# Patient Record
Sex: FEMALE | Race: BLACK OR AFRICAN AMERICAN | NOT HISPANIC OR LATINO | Employment: FULL TIME | URBAN - METROPOLITAN AREA
[De-identification: names, ages, dates, MRNs, and addresses within clinical notes are randomized per-mention and may not be internally consistent; named-entity substitution may affect disease eponyms.]

---

## 2017-01-31 ENCOUNTER — OFFICE VISIT (OUTPATIENT)
Dept: INTERNAL MEDICINE | Facility: CLINIC | Age: 57
End: 2017-01-31
Payer: COMMERCIAL

## 2017-01-31 VITALS
HEART RATE: 66 BPM | SYSTOLIC BLOOD PRESSURE: 110 MMHG | WEIGHT: 175.94 LBS | DIASTOLIC BLOOD PRESSURE: 60 MMHG | TEMPERATURE: 97 F | OXYGEN SATURATION: 99 % | BODY MASS INDEX: 24.19 KG/M2

## 2017-01-31 DIAGNOSIS — I10 HYPERTENSION, ESSENTIAL: ICD-10-CM

## 2017-01-31 DIAGNOSIS — R60.0 EDEMA EXTREMITIES: ICD-10-CM

## 2017-01-31 DIAGNOSIS — Z00.00 WELLNESS EXAMINATION: Primary | ICD-10-CM

## 2017-01-31 DIAGNOSIS — R79.89 LOW SERUM VITAMIN D: ICD-10-CM

## 2017-01-31 DIAGNOSIS — J30.9 CHRONIC ALLERGIC RHINITIS: ICD-10-CM

## 2017-01-31 LAB
25(OH)D3+25(OH)D2 SERPL-MCNC: 68 NG/ML
ALBUMIN SERPL BCP-MCNC: 3.6 G/DL
ALP SERPL-CCNC: 53 U/L
ALT SERPL W/O P-5'-P-CCNC: 25 U/L
ANION GAP SERPL CALC-SCNC: 12 MMOL/L
AST SERPL-CCNC: 26 U/L
BASOPHILS # BLD AUTO: 0.03 K/UL
BASOPHILS NFR BLD: 0.4 %
BILIRUB SERPL-MCNC: 0.7 MG/DL
BUN SERPL-MCNC: 12 MG/DL
CALCIUM SERPL-MCNC: 8.9 MG/DL
CHLORIDE SERPL-SCNC: 102 MMOL/L
CHOLEST/HDLC SERPL: 4.1 {RATIO}
CO2 SERPL-SCNC: 25 MMOL/L
CREAT SERPL-MCNC: 0.8 MG/DL
DIFFERENTIAL METHOD: NORMAL
EOSINOPHIL # BLD AUTO: 0.2 K/UL
EOSINOPHIL NFR BLD: 2.6 %
ERYTHROCYTE [DISTWIDTH] IN BLOOD BY AUTOMATED COUNT: 13.2 %
EST. GFR  (AFRICAN AMERICAN): >60 ML/MIN/1.73 M^2
EST. GFR  (NON AFRICAN AMERICAN): >60 ML/MIN/1.73 M^2
GLUCOSE SERPL-MCNC: 83 MG/DL
HCT VFR BLD AUTO: 41.8 %
HDL/CHOLESTEROL RATIO: 24.6 %
HDLC SERPL-MCNC: 232 MG/DL
HDLC SERPL-MCNC: 57 MG/DL
HGB BLD-MCNC: 13.9 G/DL
LDLC SERPL CALC-MCNC: 137.4 MG/DL
LYMPHOCYTES # BLD AUTO: 3.8 K/UL
LYMPHOCYTES NFR BLD: 45.3 %
MCH RBC QN AUTO: 29.5 PG
MCHC RBC AUTO-ENTMCNC: 33.3 %
MCV RBC AUTO: 89 FL
MONOCYTES # BLD AUTO: 0.8 K/UL
MONOCYTES NFR BLD: 9.1 %
NEUTROPHILS # BLD AUTO: 3.5 K/UL
NEUTROPHILS NFR BLD: 42.4 %
NONHDLC SERPL-MCNC: 175 MG/DL
PLATELET # BLD AUTO: 235 K/UL
PMV BLD AUTO: 11.3 FL
POTASSIUM SERPL-SCNC: 4.4 MMOL/L
PROT SERPL-MCNC: 7.4 G/DL
RBC # BLD AUTO: 4.71 M/UL
SODIUM SERPL-SCNC: 139 MMOL/L
TRIGL SERPL-MCNC: 188 MG/DL
WBC # BLD AUTO: 8.36 K/UL

## 2017-01-31 PROCEDURE — 80061 LIPID PANEL: CPT

## 2017-01-31 PROCEDURE — 82306 VITAMIN D 25 HYDROXY: CPT

## 2017-01-31 PROCEDURE — 3078F DIAST BP <80 MM HG: CPT | Mod: S$GLB,,, | Performed by: FAMILY MEDICINE

## 2017-01-31 PROCEDURE — 99999 PR PBB SHADOW E&M-EST. PATIENT-LVL III: CPT | Mod: PBBFAC,,, | Performed by: FAMILY MEDICINE

## 2017-01-31 PROCEDURE — 99396 PREV VISIT EST AGE 40-64: CPT | Mod: S$GLB,,, | Performed by: FAMILY MEDICINE

## 2017-01-31 PROCEDURE — 85025 COMPLETE CBC W/AUTO DIFF WBC: CPT

## 2017-01-31 PROCEDURE — 3074F SYST BP LT 130 MM HG: CPT | Mod: S$GLB,,, | Performed by: FAMILY MEDICINE

## 2017-01-31 PROCEDURE — 80053 COMPREHEN METABOLIC PANEL: CPT

## 2017-01-31 RX ORDER — LEVOCETIRIZINE DIHYDROCHLORIDE 5 MG/1
5 TABLET, FILM COATED ORAL DAILY
Qty: 30 TABLET | Refills: 5 | Status: SHIPPED | OUTPATIENT
Start: 2017-01-31 | End: 2018-04-16 | Stop reason: SDUPTHER

## 2017-01-31 RX ORDER — TRIAMTERENE/HYDROCHLOROTHIAZID 37.5-25 MG
TABLET ORAL
Qty: 90 TABLET | Refills: 1 | Status: SHIPPED | OUTPATIENT
Start: 2017-01-31 | End: 2017-07-26 | Stop reason: SDUPTHER

## 2017-01-31 NOTE — PROGRESS NOTES
Subjective:       Patient ID: Shahzad Feliz is a 56 y.o. female.    Chief Complaint: Medication Refill    HPI Comments: Here for refills, wellness desired.  In past she was placed on statin by prior MD and she was taking red yeast rice up until a month ago. Reviewed her prior labs with pt - mod elevation lipids.    ANNUAL EXAM:  Preventative Health Checklist 24-64 years of age    Shahzad Feliz presents today with no complaints for an annual exam.  She has completed a full 14 system review. All items not indicated are negative.  There are multiple items that cause her concern.    Counseling given on 2017    Labs/Screenings:     Females:  Pap (if sexually active and has cervix): sees gyn - Dr. Lord  Mammogram (consider after 40): gyn     Males/Females:  Total Blood Cholesterol: today  Fecal Occult Blood (50+):   Colonoscopy (50+):   Assess for Problem Drinking: reviewed  HCV Screening ( 1946-): 9/18/15     Immunizations:  Tetanus-Diptheria (Td) Booster:   Tdap: 9/18/15  Rubella (females, childbearing age):   Shingles Vaccine (60+): N/A   PNV (if indicated): N/A      Counseling:  Nutrition: Encouraged to take 5-10 servings fruits and vegetables daily   Exercise: Physical activity recommendations reviewed and given hand out - she plans to go to the gym with her daughter  Avoid Tobacco Use: reviewed  Avoid ETOH/Drug Use: reviewed  STD Prevention/Abstinence:   Avoid High Risk Behavior:   Unintended Pregnancy:   Lap-shoulder Belts: yes  No text/talk while driving: reviewed  Bike/ATV Motorcycle Helmets: N/A  Smoke Detector: yes  Safe Storage/Removal of Firearms: reviewed   Calcium Intake (females): Calcium recommendations given with handout  Regular Dental Visits: yes  Floss, Brush and Fluoride: yes       Review of Systems   Constitutional: Positive for activity change, diaphoresis and fatigue.   HENT: Positive for ear discharge, hearing loss, mouth sores, postnasal drip, sinus pressure and tinnitus.     Eyes: Positive for photophobia, redness and itching.   Respiratory: Positive for chest tightness and shortness of breath.    Cardiovascular: Positive for chest pain and leg swelling.   Gastrointestinal: Positive for abdominal pain and nausea (resolved now).   Endocrine: Positive for cold intolerance and polyuria.   Genitourinary: Positive for enuresis.   Musculoskeletal: Positive for arthralgias, back pain, joint swelling and neck stiffness.   Skin: Positive for rash.        Frequent itching to various parts of her body. Xyzal helps.   Allergic/Immunologic: Positive for environmental allergies and food allergies.   Neurological: Positive for dizziness, weakness (weak spells), light-headedness and numbness (feet at night for a few months).   Hematological: Bruises/bleeds easily.   Psychiatric/Behavioral: Positive for decreased concentration (for last year - difficulty remembering , needs reminders now).       Objective:      Physical Exam   Constitutional: She is oriented to person, place, and time. She appears well-developed and well-nourished.   HENT:   Head: Normocephalic and atraumatic.   Right Ear: Tympanic membrane, external ear and ear canal normal.   Left Ear: Tympanic membrane, external ear and ear canal normal.   Nose: Nose normal.   Mouth/Throat: Oropharynx is clear and moist. No oropharyngeal exudate.   Eyes: Conjunctivae and EOM are normal.   Neck: Normal range of motion. Neck supple. No thyromegaly present.   Cardiovascular: Normal rate, regular rhythm and normal heart sounds.  Exam reveals no gallop and no friction rub.    No murmur heard.  Pulmonary/Chest: Effort normal and breath sounds normal. She exhibits no tenderness.   Abdominal: Soft. She exhibits no distension. There is no tenderness.   Musculoskeletal: She exhibits no edema.   Lymphadenopathy:     She has no cervical adenopathy.   Neurological: She is alert and oriented to person, place, and time.   Skin: Skin is warm and dry.    Psychiatric: She has a normal mood and affect. Her behavior is normal.       Assessment:       1. Wellness examination    2. Hypertension, essential    3. Low serum vitamin D    4. Chronic allergic rhinitis        Plan:     1. Wellness examination     2. Hypertension, essential  CBC auto differential    Comprehensive metabolic panel    Lipid panel   3. Low serum vitamin D  Vitamin D   4. Chronic allergic rhinitis  levocetirizine (XYZAL) 5 MG tablet   release signed to obtain her BMD from prior Dr Antonina Sutton.  She will do BP checks 2 x week for 4 weeks  HTN med refilled for 90 days.  Evaluate for change based on her reported blood pressures.

## 2017-07-26 DIAGNOSIS — R60.0 EDEMA EXTREMITIES: ICD-10-CM

## 2017-07-26 RX ORDER — TRIAMTERENE/HYDROCHLOROTHIAZID 37.5-25 MG
1 TABLET ORAL DAILY
Qty: 90 TABLET | Refills: 1 | Status: SHIPPED | OUTPATIENT
Start: 2017-07-26 | End: 2018-03-29 | Stop reason: SDUPTHER

## 2017-09-25 ENCOUNTER — PATIENT OUTREACH (OUTPATIENT)
Dept: ADMINISTRATIVE | Facility: HOSPITAL | Age: 57
End: 2017-09-25

## 2017-10-09 ENCOUNTER — OFFICE VISIT (OUTPATIENT)
Dept: INTERNAL MEDICINE | Facility: CLINIC | Age: 57
End: 2017-10-09
Payer: COMMERCIAL

## 2017-10-09 VITALS
WEIGHT: 172.5 LBS | HEART RATE: 68 BPM | BODY MASS INDEX: 23.72 KG/M2 | DIASTOLIC BLOOD PRESSURE: 78 MMHG | OXYGEN SATURATION: 96 % | TEMPERATURE: 98 F | SYSTOLIC BLOOD PRESSURE: 122 MMHG

## 2017-10-09 DIAGNOSIS — R30.0 DYSURIA: ICD-10-CM

## 2017-10-09 DIAGNOSIS — L30.9 DERMATITIS: ICD-10-CM

## 2017-10-09 DIAGNOSIS — F43.0 STRESS REACTION: ICD-10-CM

## 2017-10-09 DIAGNOSIS — R11.0 NAUSEA: Primary | ICD-10-CM

## 2017-10-09 DIAGNOSIS — R19.7 DIARRHEA, UNSPECIFIED TYPE: ICD-10-CM

## 2017-10-09 DIAGNOSIS — E83.51 HYPOCALCEMIA: Primary | ICD-10-CM

## 2017-10-09 DIAGNOSIS — R10.811 RIGHT UPPER QUADRANT ABDOMINAL TENDERNESS WITHOUT REBOUND TENDERNESS: ICD-10-CM

## 2017-10-09 LAB
ALBUMIN SERPL BCP-MCNC: 3.6 G/DL
ALP SERPL-CCNC: 56 U/L
ALT SERPL W/O P-5'-P-CCNC: 39 U/L
ANION GAP SERPL CALC-SCNC: 10 MMOL/L
AST SERPL-CCNC: 29 U/L
BASOPHILS # BLD AUTO: 0.04 K/UL
BASOPHILS NFR BLD: 0.6 %
BILIRUB SERPL-MCNC: 0.8 MG/DL
BILIRUB SERPL-MCNC: NEGATIVE MG/DL
BLOOD URINE, POC: NEGATIVE
BUN SERPL-MCNC: 18 MG/DL
CALCIUM SERPL-MCNC: 7.9 MG/DL
CHLORIDE SERPL-SCNC: 97 MMOL/L
CO2 SERPL-SCNC: 28 MMOL/L
COLOR, POC UA: NORMAL
CREAT SERPL-MCNC: 0.9 MG/DL
DIFFERENTIAL METHOD: ABNORMAL
EOSINOPHIL # BLD AUTO: 0.2 K/UL
EOSINOPHIL NFR BLD: 3.2 %
ERYTHROCYTE [DISTWIDTH] IN BLOOD BY AUTOMATED COUNT: 13.2 %
EST. GFR  (AFRICAN AMERICAN): >60 ML/MIN/1.73 M^2
EST. GFR  (NON AFRICAN AMERICAN): >60 ML/MIN/1.73 M^2
GLUCOSE SERPL-MCNC: 140 MG/DL
GLUCOSE UR QL STRIP: NORMAL
HCT VFR BLD AUTO: 42.1 %
HGB BLD-MCNC: 14.3 G/DL
KETONES UR QL STRIP: NEGATIVE
LEUKOCYTE ESTERASE URINE, POC: NEGATIVE
LYMPHOCYTES # BLD AUTO: 3.5 K/UL
LYMPHOCYTES NFR BLD: 48.3 %
MCH RBC QN AUTO: 29.8 PG
MCHC RBC AUTO-ENTMCNC: 34 G/DL
MCV RBC AUTO: 88 FL
MONOCYTES # BLD AUTO: 0.5 K/UL
MONOCYTES NFR BLD: 7.5 %
NEUTROPHILS # BLD AUTO: 2.9 K/UL
NEUTROPHILS NFR BLD: 40.3 %
NITRITE, POC UA: NEGATIVE
PH, POC UA: 5
PLATELET # BLD AUTO: 223 K/UL
PMV BLD AUTO: 11 FL
POTASSIUM SERPL-SCNC: 3.4 MMOL/L
PROT SERPL-MCNC: 7.5 G/DL
PROTEIN, POC: NEGATIVE
RBC # BLD AUTO: 4.8 M/UL
SODIUM SERPL-SCNC: 135 MMOL/L
SPECIFIC GRAVITY, POC UA: 1.02
UROBILINOGEN, POC UA: NORMAL
WBC # BLD AUTO: 7.21 K/UL

## 2017-10-09 PROCEDURE — 80053 COMPREHEN METABOLIC PANEL: CPT

## 2017-10-09 PROCEDURE — 99214 OFFICE O/P EST MOD 30 MIN: CPT | Mod: S$GLB,,, | Performed by: FAMILY MEDICINE

## 2017-10-09 PROCEDURE — 81002 URINALYSIS NONAUTO W/O SCOPE: CPT | Mod: S$GLB,,, | Performed by: FAMILY MEDICINE

## 2017-10-09 PROCEDURE — 85025 COMPLETE CBC W/AUTO DIFF WBC: CPT

## 2017-10-09 PROCEDURE — 99999 PR PBB SHADOW E&M-EST. PATIENT-LVL IV: CPT | Mod: PBBFAC,,, | Performed by: FAMILY MEDICINE

## 2017-10-09 RX ORDER — DICYCLOMINE HYDROCHLORIDE 20 MG/1
20 TABLET ORAL EVERY 6 HOURS
Qty: 120 TABLET | Refills: 0 | Status: SHIPPED | OUTPATIENT
Start: 2017-10-09 | End: 2017-11-08

## 2017-10-09 RX ORDER — FAMOTIDINE 40 MG/1
40 TABLET, FILM COATED ORAL DAILY
Qty: 30 TABLET | Refills: 11 | Status: SHIPPED | OUTPATIENT
Start: 2017-10-09 | End: 2021-06-28

## 2017-10-09 RX ORDER — ONDANSETRON 4 MG/1
4 TABLET, ORALLY DISINTEGRATING ORAL EVERY 8 HOURS PRN
Qty: 12 TABLET | Refills: 0 | Status: SHIPPED | OUTPATIENT
Start: 2017-10-09 | End: 2018-04-16

## 2017-10-09 RX ORDER — MUPIROCIN 20 MG/G
OINTMENT TOPICAL 3 TIMES DAILY
Qty: 1 TUBE | Refills: 0 | Status: SHIPPED | OUTPATIENT
Start: 2017-10-09 | End: 2017-10-19

## 2017-10-09 NOTE — PROGRESS NOTES
Subjective:       Patient ID: Shahzad Feliz is a 57 y.o. female.    Chief Complaint: Medicare AWV    Here due to queasy sensation for about 6 weeks and bouts of diarrhea.  She was having dysuria when she made the appointment but it has mostly cleared up with increased fluids.  Today she started to have pain again.  Reviewed that she had a wellness in January and reviewed those labs.      Nausea   This is a new problem. The current episode started more than 1 month ago. The problem occurs every several days. Associated symptoms include a change in bowel habit, chills, diaphoresis, headaches, nausea, numbness (to arms and legs off and on for years - has exercises for this), a rash and urinary symptoms. Pertinent negatives include no chest pain, fever, neck pain (stiffness to neck), vertigo or vomiting. Associated symptoms comments: Diarrhea comes and nausea is usually relieved after that. Often assoc with faintness/weakness. The symptoms are aggravated by stress and exertion. She has tried rest and lying down (angel, gingerale,) for the symptoms.     Review of Systems   Constitutional: Positive for appetite change, chills and diaphoresis. Negative for fever.   HENT: Positive for ear discharge (feels wet and itches), sinus pressure and tinnitus.    Cardiovascular: Negative for chest pain.   Gastrointestinal: Positive for change in bowel habit, diarrhea, nausea and rectal pain. Negative for vomiting.   Genitourinary: Positive for dysuria.   Musculoskeletal: Negative for neck pain (stiffness to neck).   Skin: Positive for rash.   Allergic/Immunologic: Positive for environmental allergies and food allergies.   Neurological: Positive for numbness (to arms and legs off and on for years - has exercises for this) and headaches. Negative for vertigo.   Psychiatric/Behavioral: The patient is nervous/anxious.        Objective:      Physical Exam   Constitutional: She is oriented to person, place, and time. She appears  "well-developed and well-nourished.   HENT:   Head: Normocephalic and atraumatic.   Right Ear: External ear normal.   Left Ear: External ear normal.   Mouth/Throat: Oropharynx is clear and moist. No oropharyngeal exudate.   To L EAC low cerumen "dam" otherwise no lesion/discoloration   Neck: Normal range of motion. Neck supple.   Cardiovascular: Normal rate, regular rhythm and normal heart sounds.    Pulmonary/Chest: Effort normal and breath sounds normal.   Abdominal: Soft. Bowel sounds are normal. She exhibits no distension and no mass. There is tenderness (RUQ and mild suprapubic). There is no rebound and no guarding.   Neurological: She is alert and oriented to person, place, and time.   Skin: Skin is warm and dry.   Psychiatric: She has a normal mood and affect. Her behavior is normal.         Assessment/Plan:     1. Nausea  Comprehensive metabolic panel    CBC auto differential    dicyclomine (BENTYL) 20 mg tablet    US Abdomen Limited   2. Right upper quadrant abdominal tenderness without rebound tenderness  famotidine (PEPCID) 40 MG tablet    US Abdomen Limited   3. Diarrhea, unspecified type     4. Stress reaction     5. Dermatitis  mupirocin (BACTROBAN) 2 % ointment   6. Dysuria  POCT urine dipstick without microscope       "

## 2018-01-03 ENCOUNTER — TELEPHONE (OUTPATIENT)
Dept: INTERNAL MEDICINE | Facility: CLINIC | Age: 58
End: 2018-01-03

## 2018-01-03 ENCOUNTER — OFFICE VISIT (OUTPATIENT)
Dept: INTERNAL MEDICINE | Facility: CLINIC | Age: 58
End: 2018-01-03
Payer: COMMERCIAL

## 2018-01-03 VITALS
TEMPERATURE: 98 F | BODY MASS INDEX: 23.71 KG/M2 | WEIGHT: 175.06 LBS | DIASTOLIC BLOOD PRESSURE: 87 MMHG | OXYGEN SATURATION: 97 % | HEART RATE: 70 BPM | SYSTOLIC BLOOD PRESSURE: 161 MMHG | HEIGHT: 72 IN

## 2018-01-03 DIAGNOSIS — J01.10 ACUTE NON-RECURRENT FRONTAL SINUSITIS: ICD-10-CM

## 2018-01-03 PROCEDURE — 99999 PR PBB SHADOW E&M-EST. PATIENT-LVL IV: CPT | Mod: PBBFAC,,, | Performed by: NURSE PRACTITIONER

## 2018-01-03 PROCEDURE — 99214 OFFICE O/P EST MOD 30 MIN: CPT | Mod: 25,S$GLB,, | Performed by: NURSE PRACTITIONER

## 2018-01-03 RX ORDER — DOXYCYCLINE 100 MG/1
100 CAPSULE ORAL 2 TIMES DAILY
Qty: 20 CAPSULE | Refills: 0 | Status: SHIPPED | OUTPATIENT
Start: 2018-01-03 | End: 2018-01-13

## 2018-01-03 RX ORDER — METHYLPREDNISOLONE 4 MG/1
TABLET ORAL
Qty: 30 TABLET | Refills: 0 | Status: SHIPPED | OUTPATIENT
Start: 2018-01-03 | End: 2018-04-16 | Stop reason: ALTCHOICE

## 2018-01-03 NOTE — PROGRESS NOTES
Subjective:       Patient ID: Shahzad Feliz is a 57 y.o. female.    Chief Complaint: Nasal Congestion and Chest Pain    URI    This is a new problem. The current episode started 1 to 4 weeks ago. The problem has been waxing and waning. Associated symptoms include chest pain (tightness), congestion, diarrhea, headaches, a plugged ear sensation, rhinorrhea, sinus pain, sneezing and a sore throat. Pertinent negatives include no abdominal pain, coughing, dysuria, ear pain, joint pain, joint swelling, nausea, neck pain, rash, swollen glands, vomiting or wheezing. Treatments tried: chloricden. The treatment provided mild relief.     Review of Systems   Constitutional: Positive for appetite change, chills, diaphoresis and fatigue. Negative for fever.   HENT: Positive for congestion, postnasal drip, rhinorrhea, sinus pain, sinus pressure, sneezing and sore throat. Negative for ear pain.    Eyes: Negative.    Respiratory: Negative for cough and wheezing.    Cardiovascular: Positive for chest pain (tightness). Negative for palpitations.   Gastrointestinal: Positive for diarrhea. Negative for abdominal pain, nausea and vomiting.   Genitourinary: Negative for dysuria.   Musculoskeletal: Positive for myalgias. Negative for arthralgias, joint pain and neck pain.   Skin: Negative for rash.   Allergic/Immunologic: Positive for environmental allergies. Negative for immunocompromised state.   Neurological: Positive for headaches. Negative for dizziness, weakness and light-headedness.   Hematological: Negative.    Psychiatric/Behavioral: Negative.        Objective:      Physical Exam   Constitutional: She is oriented to person, place, and time. Vital signs are normal. She appears well-developed and well-nourished. No distress.   HENT:   Head: Normocephalic and atraumatic.   Right Ear: Hearing, external ear and ear canal normal. A middle ear effusion is present.   Left Ear: Hearing, external ear and ear canal normal. A middle  ear effusion is present.   Nose: Mucosal edema and rhinorrhea present. Right sinus exhibits frontal sinus tenderness. Right sinus exhibits no maxillary sinus tenderness. Left sinus exhibits frontal sinus tenderness. Left sinus exhibits no maxillary sinus tenderness.   Mouth/Throat: Posterior oropharyngeal edema and posterior oropharyngeal erythema present. No oropharyngeal exudate. No tonsillar exudate.   Eyes: Conjunctivae are normal. Pupils are equal, round, and reactive to light. Right eye exhibits no discharge. Left eye exhibits no discharge.   Neck: Normal range of motion. Neck supple.   Cardiovascular: Normal rate and regular rhythm.    Pulmonary/Chest: Effort normal and breath sounds normal. No respiratory distress. She has no wheezes.   Abdominal: Soft. Bowel sounds are normal. She exhibits no distension. There is no tenderness.   Lymphadenopathy:     She has no cervical adenopathy.   Neurological: She is alert and oriented to person, place, and time.   Skin: Skin is warm and dry. No rash noted. She is not diaphoretic.   Psychiatric: She has a normal mood and affect. Her behavior is normal.       Assessment:       1. Acute non-recurrent frontal sinusitis        Plan:     Problem List Items Addressed This Visit        ENT    Acute non-recurrent frontal sinusitis    Current Assessment & Plan     Discussed supportive home care including rest, hydration, hot fluids with honey, saline spray and nasal washes, avoidance of smoke and tylenol/motrin for sore throat and body aches. Handout given. Pt verbalizes understanding.  RTC not improving over next 3-5 day  Starting ABX since greater than a weeks of being sick         Relevant Medications    doxycycline (MONODOX) 100 MG capsule    methylPREDNISolone (MEDROL DOSEPACK) 4 mg tablet

## 2018-01-03 NOTE — TELEPHONE ENCOUNTER
----- Message from Anais Hinson sent at 1/3/2018 10:03 AM CST -----  Contact: patient  Calling in reference to coming in today for flu like symptoms. Please call patient  ASAP @ [phone]. Thanks, lupillo.

## 2018-01-03 NOTE — PATIENT INSTRUCTIONS
Self-Care for Sinusitis     Drinking plenty of water can help sinuses drain.   Sinusitis can often be managed with self-care. Self-care can keep sinuses moist and make you feel more comfortable. Remember to follow your doctor's instructions closely. This can make a big difference in getting your sinus problem under control.  Drink fluids  Drinking extra fluids helps thin your mucus. This lets it drain from your sinuses more easily. Have a glass of water every hour or two. A humidifier helps in much the same way. Fluids can also offset the drying effects of certain medicines. If you use a humidifier, follow the product maker's instructions on how to use it. Clean it on a regular schedule.  Use saltwater rinses  Rinses help keep your sinuses and nose moist. Mix a teaspoon of salt in 8 ounces of fresh, warm water. Use a bulb syringe to gently squirt the water into your nose a few times a day. You can also buy ready-made saline nasal sprays.  Apply hot or cold packs  Applying heat to the area surrounding your sinuses may make you feel more comfortable. Use a hot water bottle or a hand towel dipped in hot water. Some people also find ice packs effective for relieving pain.  Medicines  Your doctor may prescribe medications to help treat your sinusitis. If you have an infection, antibiotics can help clear it up. If you are prescribed antibiotics, take all pills on schedule until they are gone, even if you feel better. Decongestants help relieve swelling. Use decongestant sprays for short periods only under the direction of your doctor. If you have allergies, your doctor may prescribe medications to help relieve them.   Date Last Reviewed: 10/1/2016  © 4958-2880 [employer]. [address] [place] [id number]. All rights reserved. This information is not intended as a substitute for professional medical care. Always follow your healthcare professional's instructions.        Sinusitis (Antibiotic  Treatment)    The sinuses are air-filled spaces within the bones of the face. They connect to the inside of the nose. Sinusitis is an inflammation of the tissue lining the sinus cavity. Sinus inflammation can occur during a cold. It can also be due to allergies to pollens and other particles in the air. Sinusitis can cause symptoms of sinus congestion and fullness. A sinus infection causes fever, headache and facial pain. There is often green or yellow drainage from the nose or into the back of the throat (post-nasal drip). You have been given antibiotics to treat this condition.  Home care:  · Take the full course of antibiotics as instructed. Do not stop taking them, even if you feel better.  · Drink plenty of water, hot tea, and other liquids. This may help thin mucus. It also may promote sinus drainage.  · Heat may help soothe painful areas of the face. Use a towel soaked in hot water. Or,  the shower and direct the hot spray onto your face. Using a vaporizer along with a menthol rub at night may also help.   · An expectorant containing guaifenesin may help thin the mucus and promote drainage from the sinuses.  · Over-the-counter decongestants may be used unless a similar medicine was prescribed. Nasal sprays work the fastest. Use one that contains phenylephrine or oxymetazoline. First blow the nose gently. Then use the spray. Do not use these medicines more often than directed on the label or symptoms may get worse. You may also use tablets containing pseudoephedrine. Avoid products that combine ingredients, because side effects may be increased. Read labels. You can also ask the pharmacist for help. (NOTE: Persons with high blood pressure should not use decongestants. They can raise blood pressure.)  · Over-the-counter antihistamines may help if allergies contributed to your sinusitis.    · Do not use nasal rinses or irrigation during an acute sinus infection, unless told to by your health care  provider. Rinsing may spread the infection to other sinuses.  · Use acetaminophen or ibuprofen to control pain, unless another pain medicine was prescribed. (If you have chronic liver or kidney disease or ever had a stomach ulcer, talk with your doctor before using these medicines. Aspirin should never be used in anyone under 18 years of age who is ill with a fever. It may cause severe liver damage.)  · Don't smoke. This can worsen symptoms.  Follow-up care  Follow up with your healthcare provider or our staff if you are not improving within the next week.  When to seek medical advice  Call your healthcare provider if any of these occur:  · Facial pain or headache becoming more severe  · Stiff neck  · Unusual drowsiness or confusion  · Swelling of the forehead or eyelids  · Vision problems, including blurred or double vision  · Fever of 100.4ºF (38ºC) or higher, or as directed by your healthcare provider  · Seizure  · Breathing problems  · Symptoms not resolving within 10 days  Date Last Reviewed: 4/13/2015  © 2395-9534 [employer]. [address] [place] [address]. All rights reserved. This information is not intended as a substitute for professional medical care. Always follow your healthcare professional's instructions.        When to Use Antibiotics   Antibiotics are medicines used to treat infections caused by bacteria. They dont work for illnesses caused by viruses or an allergic reaction. In fact, taking antibiotics for reasons other than a bacterial infection can cause problems. For example, you may have side effects from the medicine. And if you really need an antibiotic, it may not work well.                                                                                                                                              When antibiotics wont help  Your healthcare provider wont usually prescribe antibiotics for the following conditions. You can help by not asking for  them if you have:   · A cold. This type of illness is caused by a virus. It can cause a runny nose, stuffed-up nose, sneezing, coughing, headache, mild body aches, and low fever. A cold gets better on its own in a few days to a week.  · The flu (influenza). This is a respiratory illness caused by a virus. The flu usually goes away on its own in a week or so. It can cause fever, body aches, sore throat, and fatigue.  · Bronchitis. This is an infection in the lungs most often caused by a virus. You may have coughing, phlegm, body aches, and a low fever. A common type of bronchitis is known as a chest cold (acute bronchitis). This often happens after you have a respiratory infection like a common cold. Bronchitis can take weeks to go away, but antibiotics usually dont help.  · Most sore throats. Sore throats are most often caused by viruses. Your throat may feel scratchy or achy, and it may hurt to swallow. You may also have a low fever and body aches. A sore throat usually gets better in a few days.  · Most ear infections. An ear infection may be caused by a virus or bacteria. It causes pain in the ear. Antibiotics usually dont help, and the infection goes away on its own.  · Most sinus infections (sinusitis). This kind of infection causes sinus pain and swelling, and a runny nose. In most cases, sinusitis goes away on its own, and antibiotics dont make recovery quicker.  · Allergic rhinitis. This is a set of symptoms caused by an allergic reaction. You may have sneezing, a runny nose, itchy or watery eyes, or a sore throat. Allergies are not treated with antibiotics.  · Low fever. A mild fever thats less than 100.4°F (38°C) most likely doesnt need treatment with antibiotics.   When antibiotics can help   Antibiotics can be used to treat:                                                     · Strep throat. This is a throat infectioncaused by a certain type of bacteria. Symptoms of strep throat include a sore  throat, white patches on the tonsils, red spots on the roof of the mouth, fever, body aches, and nausea and vomiting.  · Urinary tract infection (UTI). This is a bacterial infection of the bladder and the tube that takes urine out of the body. It can cause burning pain and urine thats cloudy or tinted with blood. UTIs are very common. Antibiotics usually help treat these infections.  · Some ear infections. In some cases, a healthcare provider may prescribe antibiotics for an ear infection. You may need a test to show whats causing the ear infection.  · Some sinus infections. In some cases, yourhealthcare provider may give you antibiotics. He or she may first need to make sure your symptoms arent caused by a virus, fungus, allergies, or air pollutants such as smoke.   Your doctor may also recommend antibiotics if you have a condition that can affect your immune system, such as diabetes or cancer.   Self-care at home   If your infection cant be treated with antibiotics, you can take other steps to feel better. Try the remedies below. In general:   · Rest and sleep as much as needed.  · Drink water and other clear fluids.  · Dont smoke, and avoid smoke from other people.  · Use over-the-counter medicine such as acetaminophen to ease pain or fever, as directed by your healthcare provider.   To treat sinus pain or nasal congestion:   · Put a warm, moist washcloth on your face where you feel sinus pain or pressure.  · Use a nasal spray with medicine or saline, as directed by your healthcare provider.  · Breathe in steam from a hot shower.  · Use a humidifier or cool mist vaporizer.   To quiet a cough:   · Use a humidifier or cool mist vaporizer.  · Breathe in steam from a hot shower.  · Use cough lozenges.   To sooth a sore throat:   · Suck on ice chips, popsicles, or lozenges.  · Use a sore throat spray.  · Use a humidifier or cool mist vaporizer.  · Gargle with saltwater.  · Drink warm liquids.   To ease ear  pain:   · Hold a warm, moist washcloth on the ear for 10 minutes at a time.  Date Last Reviewed: 9/1/2016  © 0327-6753 [employer]. [address] [place] [address], [place] [address]. All rights reserved. This information is not intended as a substitute for professional medical care. Always follow your healthcare professional's instructions.

## 2018-01-03 NOTE — ASSESSMENT & PLAN NOTE
Discussed supportive home care including rest, hydration, hot fluids with honey, saline spray and nasal washes, avoidance of smoke and tylenol/motrin for sore throat and body aches. Handout given. Pt verbalizes understanding.  RTC not improving over next 3-5 day  Starting ABX since greater than a weeks of being sick

## 2018-01-03 NOTE — TELEPHONE ENCOUNTER
Patient was calling in to get an appointment to be seen on today for FLU like symptoms. Patient was requesting to see Dr. Bernal, but was informed that it is her half day and that she was booked. Pt was offered to see NP Wendy Prieto and is schedule to be seen on today for 3:40pm with the NP. Patient verbalized understanding of the information given.

## 2018-03-29 DIAGNOSIS — R60.0 EDEMA EXTREMITIES: ICD-10-CM

## 2018-03-29 RX ORDER — TRIAMTERENE/HYDROCHLOROTHIAZID 37.5-25 MG
1 TABLET ORAL DAILY
Qty: 90 TABLET | Refills: 1 | Status: SHIPPED | OUTPATIENT
Start: 2018-03-29 | End: 2018-09-25 | Stop reason: SDUPTHER

## 2018-04-09 ENCOUNTER — TELEPHONE (OUTPATIENT)
Dept: INTERNAL MEDICINE | Facility: CLINIC | Age: 58
End: 2018-04-09

## 2018-04-09 ENCOUNTER — OFFICE VISIT (OUTPATIENT)
Dept: INTERNAL MEDICINE | Facility: CLINIC | Age: 58
End: 2018-04-09
Payer: COMMERCIAL

## 2018-04-09 VITALS
DIASTOLIC BLOOD PRESSURE: 77 MMHG | HEIGHT: 72 IN | WEIGHT: 163.81 LBS | BODY MASS INDEX: 22.19 KG/M2 | TEMPERATURE: 98 F | OXYGEN SATURATION: 96 % | SYSTOLIC BLOOD PRESSURE: 124 MMHG | HEART RATE: 90 BPM

## 2018-04-09 DIAGNOSIS — K64.4 HEMORRHOIDS, EXTERNAL WITHOUT COMPLICATIONS: ICD-10-CM

## 2018-04-09 DIAGNOSIS — M79.18 BUTTOCK PAIN: Primary | ICD-10-CM

## 2018-04-09 DIAGNOSIS — L30.9 DERMATITIS: ICD-10-CM

## 2018-04-09 PROBLEM — J01.10 ACUTE NON-RECURRENT FRONTAL SINUSITIS: Status: RESOLVED | Noted: 2018-01-03 | Resolved: 2018-04-09

## 2018-04-09 PROCEDURE — 3074F SYST BP LT 130 MM HG: CPT | Mod: CPTII,S$GLB,, | Performed by: FAMILY MEDICINE

## 2018-04-09 PROCEDURE — 99213 OFFICE O/P EST LOW 20 MIN: CPT | Mod: S$GLB,,, | Performed by: FAMILY MEDICINE

## 2018-04-09 PROCEDURE — 3078F DIAST BP <80 MM HG: CPT | Mod: CPTII,S$GLB,, | Performed by: FAMILY MEDICINE

## 2018-04-09 PROCEDURE — 99999 PR PBB SHADOW E&M-EST. PATIENT-LVL III: CPT | Mod: PBBFAC,,, | Performed by: FAMILY MEDICINE

## 2018-04-09 RX ORDER — B-COMPLEX WITH VITAMIN C
1 TABLET ORAL DAILY
COMMUNITY
End: 2018-04-16 | Stop reason: CLARIF

## 2018-04-09 RX ORDER — AMOXICILLIN 500 MG
1 CAPSULE ORAL DAILY
COMMUNITY
End: 2022-10-19

## 2018-04-09 RX ORDER — CLOBETASOL PROPIONATE 0.5 MG/G
CREAM TOPICAL 2 TIMES DAILY
Qty: 15 G | Refills: 0 | Status: SHIPPED | OUTPATIENT
Start: 2018-04-09 | End: 2021-06-28

## 2018-04-09 RX ORDER — LEVOFLOXACIN 500 MG/1
500 TABLET, FILM COATED ORAL DAILY
Qty: 5 TABLET | Refills: 0 | Status: SHIPPED | OUTPATIENT
Start: 2018-04-09 | End: 2018-04-16 | Stop reason: ALTCHOICE

## 2018-04-09 RX ORDER — MUPIROCIN 20 MG/G
OINTMENT TOPICAL 3 TIMES DAILY
Qty: 1 TUBE | Refills: 0 | Status: SHIPPED | OUTPATIENT
Start: 2018-04-09 | End: 2019-04-16 | Stop reason: SDUPTHER

## 2018-04-09 NOTE — PROGRESS NOTES
"Subjective:       Patient ID: Shahzad Feliz is a 57 y.o. female.    Chief Complaint: Back Pain and Tailbone Pain    Here today c/o pain to bilateral low buttocks. States her hemorrhoids are flared up as well.  States she has had hx of cysts to the buttocks. This current pain started bothering her three days ago. She had soreness that just lasted a day about a week before.  No temps. Did have some sweating.  Taking corecedin because she felt bad all over - "flu like" with a stiff neck, pressure behind eyes, achey all over. Hot baths help.   She feels swollen to her buttocks on both sides of midline.      Review of Systems   Musculoskeletal: Positive for back pain.       Objective:      Physical Exam   Constitutional: She is oriented to person, place, and time. She appears well-developed and well-nourished.   HENT:   Head: Normocephalic and atraumatic.   Right Ear: External ear normal.   Left Ear: External ear normal.   Mouth/Throat: Oropharynx is clear and moist. No oropharyngeal exudate.   Neck: Normal range of motion. Neck supple.   Cardiovascular: Normal rate, regular rhythm and normal heart sounds.    Pulmonary/Chest: Effort normal and breath sounds normal.   Abdominal: Soft. Bowel sounds are normal. She exhibits no distension. There is no tenderness.   Genitourinary:   Genitourinary Comments: No acute hemorrhoids present - one soft non-acute tag present   Musculoskeletal:   No erythema or swelling present but there is tenderness to the left of midline buttock below sacrum   Neurological: She is alert and oriented to person, place, and time.   Skin: Skin is warm and dry.   Psychiatric: She has a normal mood and affect. Her behavior is normal.         Assessment/Plan:     1. Buttock pain  levoFLOXacin (LEVAQUIN) 500 MG tablet   2. Dermatitis  mupirocin (BACTROBAN) 2 % ointment   3. Hemorrhoids, external without complications  clobetasol (TEMOVATE) 0.05 % cream       "

## 2018-04-09 NOTE — TELEPHONE ENCOUNTER
Pt was calling in regards to being seen sooner for her back pain. Reschedule the pt appointment. Pt verbalized understanding of the information given. Pt scheduled to be seen on this morning for 10 am.

## 2018-04-09 NOTE — TELEPHONE ENCOUNTER
----- Message from Bel Vela sent at 4/6/2018  3:22 PM CDT -----  Contact: pt   Pt is requesting a call back from nurse in regards to lower back pain pt needs to be fit in to see Dr. King.                                                   [phone]

## 2018-04-16 ENCOUNTER — OFFICE VISIT (OUTPATIENT)
Dept: INTERNAL MEDICINE | Facility: CLINIC | Age: 58
End: 2018-04-16
Payer: COMMERCIAL

## 2018-04-16 VITALS
BODY MASS INDEX: 23.19 KG/M2 | DIASTOLIC BLOOD PRESSURE: 81 MMHG | SYSTOLIC BLOOD PRESSURE: 121 MMHG | OXYGEN SATURATION: 96 % | WEIGHT: 171.19 LBS | HEIGHT: 72 IN | HEART RATE: 75 BPM | TEMPERATURE: 97 F

## 2018-04-16 DIAGNOSIS — M54.2 NECK PAIN, ACUTE: ICD-10-CM

## 2018-04-16 DIAGNOSIS — J30.9 CHRONIC ALLERGIC RHINITIS, UNSPECIFIED SEASONALITY, UNSPECIFIED TRIGGER: ICD-10-CM

## 2018-04-16 DIAGNOSIS — M79.18 BUTTOCK PAIN: Primary | ICD-10-CM

## 2018-04-16 DIAGNOSIS — K64.4 HEMORRHOIDS, EXTERNAL WITHOUT COMPLICATIONS: ICD-10-CM

## 2018-04-16 DIAGNOSIS — K64.8 INTERNAL PROLAPSED HEMORRHOIDS: ICD-10-CM

## 2018-04-16 DIAGNOSIS — Z98.890 HISTORY OF SURGICAL REMOVAL OF PILONIDAL CYST: ICD-10-CM

## 2018-04-16 PROCEDURE — 3074F SYST BP LT 130 MM HG: CPT | Mod: CPTII,S$GLB,, | Performed by: FAMILY MEDICINE

## 2018-04-16 PROCEDURE — 99213 OFFICE O/P EST LOW 20 MIN: CPT | Mod: S$GLB,,, | Performed by: FAMILY MEDICINE

## 2018-04-16 PROCEDURE — 99999 PR PBB SHADOW E&M-EST. PATIENT-LVL III: CPT | Mod: PBBFAC,,, | Performed by: FAMILY MEDICINE

## 2018-04-16 PROCEDURE — 3079F DIAST BP 80-89 MM HG: CPT | Mod: CPTII,S$GLB,, | Performed by: FAMILY MEDICINE

## 2018-04-16 RX ORDER — METRONIDAZOLE 500 MG/1
500 TABLET ORAL 2 TIMES DAILY
Qty: 14 TABLET | Refills: 0 | Status: SHIPPED | OUTPATIENT
Start: 2018-04-16 | End: 2018-04-26

## 2018-04-16 RX ORDER — VITAMIN B COMPLEX
1 CAPSULE ORAL DAILY
COMMUNITY

## 2018-04-16 RX ORDER — LEVOCETIRIZINE DIHYDROCHLORIDE 5 MG/1
5 TABLET, FILM COATED ORAL DAILY
Qty: 30 TABLET | Refills: 12 | Status: SHIPPED | OUTPATIENT
Start: 2018-04-16 | End: 2019-06-20 | Stop reason: SDUPTHER

## 2018-04-16 RX ORDER — NAPROXEN 500 MG/1
500 TABLET ORAL 2 TIMES DAILY
Qty: 30 TABLET | Refills: 0 | Status: SHIPPED | OUTPATIENT
Start: 2018-04-16 | End: 2019-04-16 | Stop reason: SDUPTHER

## 2018-04-16 NOTE — PROGRESS NOTES
Subjective:       Patient ID: Shahzad Feliz is a 57 y.o. female.    Chief Complaint: Tailbone Pain    Here for recheck on tailbone pain after course of levaquin 500 x 5 days. States pain is less severe but still present - with sitting.  She also had hemorrhoids which were acting up. She was given clobetasole ointment but does not note any difference except itching is less.  She is having gas which gave her pain all night long last night. Rec taking probiotics.      Review of Systems   Constitutional: Negative for fever.   Gastrointestinal: Positive for abdominal distention and constipation. Negative for diarrhea.       Objective:      Physical Exam   Constitutional: She is oriented to person, place, and time. She appears well-developed and well-nourished.   HENT:   Head: Normocephalic and atraumatic.   Genitourinary:   Genitourinary Comments: Soft non acute external hem tags present. There is seen at anal opening small internal hemmorhoidal tissue not protruding but present.   Musculoskeletal:   To base of coccyx TTP - no erythema, no swelling.  There is a subacute area of erythema symmetrically approx 1.5 x 1 cm overlying proximal midline between buttocks, c/w inflammation but not infection. This area with no swelling, NTTP.   Neurological: She is alert and oriented to person, place, and time.   Skin: Skin is warm and dry.   Psychiatric: She has a normal mood and affect. Her behavior is normal.         Assessment/Plan:     1. Buttock pain  metroNIDAZOLE (FLAGYL) 500 MG tablet   2. Hemorrhoids, external without complications     3. History of surgical removal of pilonidal cyst     4. Chronic allergic rhinitis, unspecified seasonality, unspecified trigger  levocetirizine (XYZAL) 5 MG tablet   5. Internal prolapsed hemorrhoids  hydrocortisone-pramoxine (PROCTOFOAM-HS) rectal foam   6. Neck pain, acute  naproxen (NAPROSYN) 500 MG tablet     Probable pilonidal cyst - history of surgical procedures x 2 in past.  Still TTP but improved.   Proctofoam for internal hemorrhoid relief.  Trial alternate antibiotic for possible deep pilonidal infection - no evidence on exam except coccygeal TTP.

## 2018-05-07 ENCOUNTER — TELEPHONE (OUTPATIENT)
Dept: INTERNAL MEDICINE | Facility: CLINIC | Age: 58
End: 2018-05-07

## 2018-05-07 DIAGNOSIS — Z98.890 HISTORY OF SURGICAL REMOVAL OF PILONIDAL CYST: ICD-10-CM

## 2018-05-07 DIAGNOSIS — M79.18 BUTTOCK PAIN: Primary | ICD-10-CM

## 2018-05-07 NOTE — TELEPHONE ENCOUNTER
The patient called to state that her pain and discomfort around the tail bone area has come back.   Please advise  After the second abx everything went away and about a week ago she notice some tenderness and it has steady gotten worst

## 2018-05-07 NOTE — TELEPHONE ENCOUNTER
----- Message from Lisette Brown sent at 5/7/2018  4:31 PM CDT -----  Contact: Patient   Patient returned call, Please call her at [phone].    Thanks  Td

## 2018-05-07 NOTE — TELEPHONE ENCOUNTER
Will have her consult with General surgery.  Pt informed and she will call for appt.    Pls check schedule and if she has not obtained an appt pls schedule her.

## 2018-05-08 NOTE — TELEPHONE ENCOUNTER
Called the patient and scheduled her general surgery appointment. Pt verbalized understanding of the appointment information given.

## 2018-05-11 ENCOUNTER — OFFICE VISIT (OUTPATIENT)
Dept: SURGERY | Facility: CLINIC | Age: 58
End: 2018-05-11
Payer: COMMERCIAL

## 2018-05-11 VITALS
TEMPERATURE: 98 F | HEIGHT: 72 IN | WEIGHT: 168.44 LBS | BODY MASS INDEX: 22.81 KG/M2 | SYSTOLIC BLOOD PRESSURE: 127 MMHG | DIASTOLIC BLOOD PRESSURE: 71 MMHG | HEART RATE: 86 BPM

## 2018-05-11 DIAGNOSIS — L05.91 NON-INFECTED PILONIDAL CYST: Primary | ICD-10-CM

## 2018-05-11 PROCEDURE — 99203 OFFICE O/P NEW LOW 30 MIN: CPT | Mod: S$GLB,,, | Performed by: SURGERY

## 2018-05-11 PROCEDURE — 3078F DIAST BP <80 MM HG: CPT | Mod: CPTII,S$GLB,, | Performed by: SURGERY

## 2018-05-11 PROCEDURE — 3008F BODY MASS INDEX DOCD: CPT | Mod: CPTII,S$GLB,, | Performed by: SURGERY

## 2018-05-11 PROCEDURE — 3074F SYST BP LT 130 MM HG: CPT | Mod: CPTII,S$GLB,, | Performed by: SURGERY

## 2018-05-11 PROCEDURE — 99999 PR PBB SHADOW E&M-EST. PATIENT-LVL III: CPT | Mod: PBBFAC,,, | Performed by: SURGERY

## 2018-05-11 NOTE — PROGRESS NOTES
Shahzad is 57-year-old -American female patient who is being seen for the evaluation of a palpable lump on patient's tailbone area.  According to the patient, she had pilonidal cyst and had excision twice in the past.  This was more than 20 years ago.  She didn't have any symptoms associated with that disease until recently.  She noticed fullness in that tailbone area as well as slight discomfort.  Several weeks ago, she noticed increasing bulge with tenderness.  She was seen by her primary care provider and was given antibiotic.  After the antibiotic treatment, the condition improved.  But the symptom is recurring again.  She was then referred for surgical evaluation.  The physical exam confirms previous scar from pilonidal cyst excision.  There is no visible signs of infection.  There is no visible signs of fullness or bulge.  It appears that acute infection of recurrence of a pilonidal cyst has resolved with antibiotic treatment.  My recommendation at this point is to have the patient follow-up with me next time this recurs.  I have advised the patient not to take antibiotic prior to the visit.  The patient understands the planned.  She will follow-up with me as needed basis only then.  Total time approximately half an hour was spent for this patient, and more than 50% of that was spent for treatment planning and counseling.    Isaac Abdalla    
patient had quit smoking

## 2018-05-11 NOTE — LETTER
May 11, 2018      Roseanne Bernal MD  [address] [place] [address] [address] [place] [address]           [facility]  [address] [facility] [facility] [address] [place] [address]  Phone: [phone]  Fax: [phone]          Patient: Shahzad Feliz   MR Number: [id number]   YOB: 1960   Date of Visit: 5/11/2018       Dear Dr. Roseanne Bernal:    Thank you for referring Shahzad Feliz to me for evaluation. Attached you will find relevant portions of my assessment and plan of care.    If you have questions, please do not hesitate to call me. I look forward to following Shahzad Feliz along with you.    Sincerely,    Isaac Abdalla MD    Enclosure  CC:  No Recipients    If you would like to receive this communication electronically, please contact [email or web address] or [phone] to request more information on [employer] access.    For providers and/or their staff who would like to refer a patient to [facility], please contact us through our one-stop-shop provider referral line, [facility], at [phone].    If you feel you have received this communication in error or would no longer like to receive these types of communications, please e-mail [email or web address]

## 2018-09-25 DIAGNOSIS — R60.0 EDEMA EXTREMITIES: ICD-10-CM

## 2018-09-25 RX ORDER — TRIAMTERENE/HYDROCHLOROTHIAZID 37.5-25 MG
1 TABLET ORAL DAILY
Qty: 90 TABLET | Refills: 1 | Status: SHIPPED | OUTPATIENT
Start: 2018-09-25 | End: 2019-07-11 | Stop reason: SDUPTHER

## 2018-12-06 ENCOUNTER — OFFICE VISIT (OUTPATIENT)
Dept: INTERNAL MEDICINE | Facility: CLINIC | Age: 58
End: 2018-12-06
Payer: COMMERCIAL

## 2018-12-06 VITALS
DIASTOLIC BLOOD PRESSURE: 80 MMHG | HEART RATE: 74 BPM | TEMPERATURE: 97 F | WEIGHT: 179.44 LBS | BODY MASS INDEX: 24.3 KG/M2 | HEIGHT: 72 IN | OXYGEN SATURATION: 98 % | SYSTOLIC BLOOD PRESSURE: 124 MMHG

## 2018-12-06 DIAGNOSIS — I10 HYPERTENSION, ESSENTIAL: ICD-10-CM

## 2018-12-06 DIAGNOSIS — Z00.00 WELLNESS EXAMINATION: Primary | ICD-10-CM

## 2018-12-06 DIAGNOSIS — Z12.11 SCREENING FOR COLON CANCER: ICD-10-CM

## 2018-12-06 DIAGNOSIS — N39.46 MIXED STRESS AND URGE URINARY INCONTINENCE: ICD-10-CM

## 2018-12-06 PROCEDURE — 3074F SYST BP LT 130 MM HG: CPT | Mod: CPTII,S$GLB,, | Performed by: FAMILY MEDICINE

## 2018-12-06 PROCEDURE — 99999 PR PBB SHADOW E&M-EST. PATIENT-LVL IV: CPT | Mod: PBBFAC,,, | Performed by: FAMILY MEDICINE

## 2018-12-06 PROCEDURE — 3079F DIAST BP 80-89 MM HG: CPT | Mod: CPTII,S$GLB,, | Performed by: FAMILY MEDICINE

## 2018-12-06 PROCEDURE — 99396 PREV VISIT EST AGE 40-64: CPT | Mod: S$GLB,,, | Performed by: FAMILY MEDICINE

## 2018-12-06 NOTE — PROGRESS NOTES
Subjective:       Patient ID: Shahzad Feliz is a 58 y.o. female.    Chief Complaint: Annual Exam    Here for annual wellness - seeing Dr Lord next week for mammogram etc.      ANNUAL EXAM:  Preventative Health Checklist 24-64 years of age    Shahzad Feliz presents today for an annual exam.    Mammogram due on 11/02/2017 - gyn next week 12/12/18  Colonoscopy due on 12/17/2017 - order  Influenza Vaccine due on 08/01/2018 - decline    Health Maintenance Summary                Mammogram Overdue 11/2/2017      Done 11/2/2016 [employer]: WORKFLOW - HEALTHY PLANET - EXTERNAL DATA -   EXTERNAL PROCEDURES DATE - MAMMOGRAM    Colonoscopy Overdue 12/17/2017      Done 12/17/2014 tubular adenoma, hyperplastic polyp    Influenza Vaccine Overdue 8/1/2018     Lipid Panel Next Due 1/31/2022      Done 1/31/2017 LIPID PANEL     Done 9/18/2015 LIPID PANEL     Done 9/18/2014 LIPID PANEL    TETANUS VACCINE Next Due 9/18/2025      Done 9/18/2015 Imm Admin: Tdap    Hepatitis C Screening This plan is no longer active.      Done 9/18/2015 HEPATITIS C ANTIBODY        Counseling:  Nutrition: Encouraged to take 5-10 servings fruits and vegetables daily   Exercise:  Physical activity recommendations reviewed and given hand out - not much activity lately  Avoid Tobacco Use: reviewed  Avoid ETOH/Drug Use:  reviewed  STD Prevention/Abstinence:   Avoid High Risk Behavior:   Unintended Pregnancy:    Lap-shoulder Belts:  Yes  No text/talk while driving: Reviewed  Bike/ATV Motorcycle Helmets:  N/A  Smoke Detector:  yes  Safe Storage/Removal of Firearms: reviewed    Calcium Intake (females):  Calcium recommendations given with handout  Regular Dental Visits:  yes  Floss, Brush and Fluoride: yes    She has completed a full 14 system review. All items are negative except as indicated.      Review of Systems   Constitutional: Positive for fatigue.   HENT: Positive for sinus pressure.    Eyes: Positive for itching.   Respiratory: Negative.     Cardiovascular: Negative.    Gastrointestinal: Negative.    Endocrine: Positive for polyuria.   Genitourinary: Positive for enuresis.   Musculoskeletal: Positive for neck stiffness.   Skin: Positive for rash.   Allergic/Immunologic: Positive for environmental allergies and food allergies.   Neurological: Positive for dizziness (vertigo has had this in past - recent ly again), light-headedness and numbness.   Hematological: Negative.    Psychiatric/Behavioral: Negative.        Objective:      Physical Exam   Constitutional: She is oriented to person, place, and time. She appears well-developed and well-nourished.   HENT:   Head: Normocephalic and atraumatic.   Right Ear: Tympanic membrane, external ear and ear canal normal.   Left Ear: Tympanic membrane, external ear and ear canal normal.   Nose: Nose normal.   Mouth/Throat: Oropharynx is clear and moist. No oropharyngeal exudate.   Eyes: Conjunctivae and EOM are normal.   Neck: Normal range of motion. Neck supple. No thyromegaly present.   Cardiovascular: Normal rate, regular rhythm and normal heart sounds. Exam reveals no gallop and no friction rub.   No murmur heard.  Pulmonary/Chest: Effort normal and breath sounds normal. She exhibits no tenderness.   Abdominal: Soft. She exhibits no distension. There is no tenderness.   Musculoskeletal: She exhibits no edema.   Lymphadenopathy:     She has no cervical adenopathy.   Neurological: She is alert and oriented to person, place, and time.   Skin: Skin is warm and dry.   Psychiatric: She has a normal mood and affect. Her behavior is normal.         Assessment/Plan:     1. Wellness examination  Case request GI: COLONOSCOPY    CBC auto differential    Lipid panel    Comprehensive metabolic panel   2. Hypertension, essential  CBC auto differential    Lipid panel    Comprehensive metabolic panel   3. Screening for colon cancer  Case request GI: COLONOSCOPY   4. Mixed stress and urge urinary incontinence  Ambulatory  Referral to Physical/Occupational Therapy    Ambulatory referral to Urology   recheck dep on labs - 6-12 months

## 2018-12-24 ENCOUNTER — DOCUMENTATION ONLY (OUTPATIENT)
Dept: ENDOSCOPY | Facility: HOSPITAL | Age: 58
End: 2018-12-24

## 2019-02-07 ENCOUNTER — OFFICE VISIT (OUTPATIENT)
Dept: UROLOGY | Facility: CLINIC | Age: 59
End: 2019-02-07
Payer: COMMERCIAL

## 2019-02-07 VITALS — WEIGHT: 174.19 LBS | HEIGHT: 72 IN | BODY MASS INDEX: 23.59 KG/M2

## 2019-02-07 DIAGNOSIS — N32.81 OAB (OVERACTIVE BLADDER): ICD-10-CM

## 2019-02-07 DIAGNOSIS — R32 URINARY INCONTINENCE, UNSPECIFIED TYPE: Primary | ICD-10-CM

## 2019-02-07 LAB
BILIRUB SERPL-MCNC: NORMAL MG/DL
BLOOD URINE, POC: NORMAL
COLOR, POC UA: YELLOW
GLUCOSE UR QL STRIP: NORMAL
KETONES UR QL STRIP: NORMAL
LEUKOCYTE ESTERASE URINE, POC: NORMAL
NITRITE, POC UA: NORMAL
PH, POC UA: 5
PROTEIN, POC: NORMAL
SPECIFIC GRAVITY, POC UA: 1.02
UROBILINOGEN, POC UA: NORMAL

## 2019-02-07 PROCEDURE — 81002 POCT URINE DIPSTICK WITHOUT MICROSCOPE: ICD-10-PCS | Mod: S$GLB,,, | Performed by: UROLOGY

## 2019-02-07 PROCEDURE — 99244 PR OFFICE CONSULTATION,LEVEL IV: ICD-10-PCS | Mod: 25,S$GLB,, | Performed by: UROLOGY

## 2019-02-07 PROCEDURE — 81002 URINALYSIS NONAUTO W/O SCOPE: CPT | Mod: S$GLB,,, | Performed by: UROLOGY

## 2019-02-07 PROCEDURE — 99999 PR PBB SHADOW E&M-EST. PATIENT-LVL III: CPT | Mod: PBBFAC,,, | Performed by: UROLOGY

## 2019-02-07 PROCEDURE — 99999 PR PBB SHADOW E&M-EST. PATIENT-LVL III: ICD-10-PCS | Mod: PBBFAC,,, | Performed by: UROLOGY

## 2019-02-07 PROCEDURE — 99244 OFF/OP CNSLTJ NEW/EST MOD 40: CPT | Mod: 25,S$GLB,, | Performed by: UROLOGY

## 2019-02-07 RX ORDER — OXYBUTYNIN CHLORIDE 5 MG/1
5 TABLET ORAL 3 TIMES DAILY
Qty: 90 TABLET | Refills: 11 | Status: SHIPPED | OUTPATIENT
Start: 2019-02-07 | End: 2020-10-03

## 2019-02-07 NOTE — PROGRESS NOTES
Chief Complaint: Incontinence    HPI:   2/7/19: 59 yo woman referred by Dr. Bernal for back swelling and pressure she feels in her bladder.  Can't cough without using the bathroom.  Uses 1 safety pad some days but if coughing or lifting needs more.  No abd/pelvic pain and no exac/rel factors.  No hematuria.  No urolithiasis.  Intermittent stream.  Hesitancy.  Some more to void when stands.  Hurried voiding not a relaxer except with BM.  No  history.  Normal sexual function. Draftsman with a lot of sitting.  In the past probably did have a holding habit.    Allergies:  Pcn [penicillins]    Medications: has a current medication list which includes the following prescription(s): ascorbic acid, b complex vitamins, cholecalciferol (vitamin d3), clobetasol, estrogens (conjugated), famotidine, fish oil-omega-3 fatty acids, hydrocortisone-pramoxine, levocetirizine, multivitamin, mupirocin, naproxen, and triamterene-hydrochlorothiazide 37.5-25 mg.    Review of Systems:  General: No fever, chills, fatigability, or weight loss.  Skin: No rashes, itching, or changes in color or texture of skin.  Chest: Denies HUNTLEY, cyanosis, wheezing, cough, and sputum production.  Abdomen: Appetite fine. No weight loss. Denies diarrhea, abdominal pain, hematemesis, or blood in stool.  Musculoskeletal: No joint stiffness or swelling. Some back pain.  : As above.  All other review of systems negative.    PMH:   has a past medical history of Bone spur, Bulging disc, and Hypertension.    PSH:   has a past surgical history that includes Ovarian cyst removal; Hysterectomy; and Colonoscopy w/ biopsies and polypectomy (12/17/14).    FamHx: family history is not on file.    SocHx:  reports that she has quit smoking. she has never used smokeless tobacco. She reports that she drinks alcohol. She reports that she does not use drugs.     Physical Exam:  Vitals: There were no vitals filed for this visit.  General: A&Ox3. No apparent distress. No  deformities.  Neck: No masses. Normal thyroid.  Lungs: normal inspiration. No use of accessory muscles.  Heart: normal pulse. No arrhythmias.  Abdomen: Soft. NT. ND. No masses. No hernias. No hepatosplenomegaly.  Lymphatic: Neck and groin nodes negative.  Skin: The skin is warm and dry. No jaundice.  Ext: No c/c/e.  : External genitalia normal.     Labs/Studies:   Urinalysis performed in clinic, summary: UA normal     Impression/Plan:   1. No suggestion of UTI.  PE normal.  Will refer for PFMT and rx of oxybutinin.  RTC prn.

## 2019-02-07 NOTE — LETTER
February 7, 2019      Roseanne Bernal MD  [address] [place] [address] [address] [place] [address]           [facility]  [address] [place] [address]  [place] [address]  Phone: [phone]  Fax: [phone]          Patient: Shahzad Feliz   MR Number: [id number]   YOB: 1960   Date of Visit: 2/7/2019       Dear Dr. Roseanne Bernal:    Thank you for referring Shahzad Feliz to me for evaluation. Attached you will find relevant portions of my assessment and plan of care.    If you have questions, please do not hesitate to call me. I look forward to following Shahzad Feliz along with you.    Sincerely,    Vinay Dorsye IV, MD    Enclosure  CC:  No Recipients    If you would like to receive this communication electronically, please contact [email or web address] or [phone] to request more information on [employer] Link access.    For providers and/or their staff who would like to refer a patient to [facility], please contact us through our one-stop-shop provider referral line, [facility], at [phone].    If you feel you have received this communication in error or would no longer like to receive these types of communications, please e-mail [email or web address]

## 2019-04-16 ENCOUNTER — OFFICE VISIT (OUTPATIENT)
Dept: INTERNAL MEDICINE | Facility: CLINIC | Age: 59
End: 2019-04-16
Payer: COMMERCIAL

## 2019-04-16 VITALS
HEIGHT: 71 IN | SYSTOLIC BLOOD PRESSURE: 124 MMHG | DIASTOLIC BLOOD PRESSURE: 78 MMHG | OXYGEN SATURATION: 97 % | WEIGHT: 172.94 LBS | HEART RATE: 90 BPM | BODY MASS INDEX: 24.21 KG/M2 | TEMPERATURE: 97 F

## 2019-04-16 DIAGNOSIS — R51.9 FRONTAL HEADACHE: Primary | ICD-10-CM

## 2019-04-16 DIAGNOSIS — Z00.00 WELLNESS EXAMINATION: ICD-10-CM

## 2019-04-16 DIAGNOSIS — I10 HYPERTENSION, ESSENTIAL: ICD-10-CM

## 2019-04-16 DIAGNOSIS — M54.2 NECK PAIN, ACUTE: ICD-10-CM

## 2019-04-16 DIAGNOSIS — L30.9 DERMATITIS: ICD-10-CM

## 2019-04-16 LAB
ALBUMIN SERPL BCP-MCNC: 3.7 G/DL (ref 3.5–5.2)
ALP SERPL-CCNC: 56 U/L (ref 55–135)
ALT SERPL W/O P-5'-P-CCNC: 19 U/L (ref 10–44)
ANION GAP SERPL CALC-SCNC: 12 MMOL/L (ref 8–16)
AST SERPL-CCNC: 20 U/L (ref 10–40)
BASOPHILS # BLD AUTO: 0.05 K/UL (ref 0–0.2)
BASOPHILS NFR BLD: 0.6 % (ref 0–1.9)
BILIRUB SERPL-MCNC: 1.2 MG/DL (ref 0.1–1)
BUN SERPL-MCNC: 9 MG/DL (ref 6–20)
CALCIUM SERPL-MCNC: 9.7 MG/DL (ref 8.7–10.5)
CHLORIDE SERPL-SCNC: 99 MMOL/L (ref 95–110)
CHOLEST SERPL-MCNC: 257 MG/DL (ref 120–199)
CHOLEST/HDLC SERPL: 4.5 {RATIO} (ref 2–5)
CO2 SERPL-SCNC: 26 MMOL/L (ref 23–29)
CREAT SERPL-MCNC: 0.8 MG/DL (ref 0.5–1.4)
CRP SERPL-MCNC: 42.8 MG/L (ref 0–8.2)
DIFFERENTIAL METHOD: NORMAL
EOSINOPHIL # BLD AUTO: 0.4 K/UL (ref 0–0.5)
EOSINOPHIL NFR BLD: 4.6 % (ref 0–8)
ERYTHROCYTE [DISTWIDTH] IN BLOOD BY AUTOMATED COUNT: 13.1 % (ref 11.5–14.5)
ERYTHROCYTE [SEDIMENTATION RATE] IN BLOOD BY WESTERGREN METHOD: 19 MM/HR (ref 0–36)
EST. GFR  (AFRICAN AMERICAN): >60 ML/MIN/1.73 M^2
EST. GFR  (NON AFRICAN AMERICAN): >60 ML/MIN/1.73 M^2
GLUCOSE SERPL-MCNC: 121 MG/DL (ref 70–110)
HCT VFR BLD AUTO: 46.6 % (ref 37–48.5)
HDLC SERPL-MCNC: 57 MG/DL (ref 40–75)
HDLC SERPL: 22.2 % (ref 20–50)
HGB BLD-MCNC: 15.3 G/DL (ref 12–16)
IMM GRANULOCYTES # BLD AUTO: 0.02 K/UL (ref 0–0.04)
IMM GRANULOCYTES NFR BLD AUTO: 0.3 % (ref 0–0.5)
LDLC SERPL CALC-MCNC: 134.2 MG/DL (ref 63–159)
LYMPHOCYTES # BLD AUTO: 2 K/UL (ref 1–4.8)
LYMPHOCYTES NFR BLD: 24.5 % (ref 18–48)
MCH RBC QN AUTO: 29.5 PG (ref 27–31)
MCHC RBC AUTO-ENTMCNC: 32.8 G/DL (ref 32–36)
MCV RBC AUTO: 90 FL (ref 82–98)
MONOCYTES # BLD AUTO: 1 K/UL (ref 0.3–1)
MONOCYTES NFR BLD: 12.4 % (ref 4–15)
NEUTROPHILS # BLD AUTO: 4.6 K/UL (ref 1.8–7.7)
NEUTROPHILS NFR BLD: 57.6 % (ref 38–73)
NONHDLC SERPL-MCNC: 200 MG/DL
NRBC BLD-RTO: 0 /100 WBC
PLATELET # BLD AUTO: 246 K/UL (ref 150–350)
PMV BLD AUTO: 11.1 FL (ref 9.2–12.9)
POTASSIUM SERPL-SCNC: 3.8 MMOL/L (ref 3.5–5.1)
PROT SERPL-MCNC: 7.5 G/DL (ref 6–8.4)
RBC # BLD AUTO: 5.18 M/UL (ref 4–5.4)
SODIUM SERPL-SCNC: 137 MMOL/L (ref 136–145)
TRIGL SERPL-MCNC: 329 MG/DL (ref 30–150)
WBC # BLD AUTO: 7.97 K/UL (ref 3.9–12.7)

## 2019-04-16 PROCEDURE — 3074F PR MOST RECENT SYSTOLIC BLOOD PRESSURE < 130 MM HG: ICD-10-PCS | Mod: CPTII,S$GLB,, | Performed by: FAMILY MEDICINE

## 2019-04-16 PROCEDURE — 3008F BODY MASS INDEX DOCD: CPT | Mod: CPTII,S$GLB,, | Performed by: FAMILY MEDICINE

## 2019-04-16 PROCEDURE — 3078F DIAST BP <80 MM HG: CPT | Mod: CPTII,S$GLB,, | Performed by: FAMILY MEDICINE

## 2019-04-16 PROCEDURE — 80061 LIPID PANEL: CPT

## 2019-04-16 PROCEDURE — 99213 PR OFFICE/OUTPT VISIT, EST, LEVL III, 20-29 MIN: ICD-10-PCS | Mod: S$GLB,,, | Performed by: FAMILY MEDICINE

## 2019-04-16 PROCEDURE — 99999 PR PBB SHADOW E&M-EST. PATIENT-LVL III: CPT | Mod: PBBFAC,,, | Performed by: FAMILY MEDICINE

## 2019-04-16 PROCEDURE — 3078F PR MOST RECENT DIASTOLIC BLOOD PRESSURE < 80 MM HG: ICD-10-PCS | Mod: CPTII,S$GLB,, | Performed by: FAMILY MEDICINE

## 2019-04-16 PROCEDURE — 86140 C-REACTIVE PROTEIN: CPT

## 2019-04-16 PROCEDURE — 85025 COMPLETE CBC W/AUTO DIFF WBC: CPT

## 2019-04-16 PROCEDURE — 3008F PR BODY MASS INDEX (BMI) DOCUMENTED: ICD-10-PCS | Mod: CPTII,S$GLB,, | Performed by: FAMILY MEDICINE

## 2019-04-16 PROCEDURE — 99999 PR PBB SHADOW E&M-EST. PATIENT-LVL III: ICD-10-PCS | Mod: PBBFAC,,, | Performed by: FAMILY MEDICINE

## 2019-04-16 PROCEDURE — 99213 OFFICE O/P EST LOW 20 MIN: CPT | Mod: S$GLB,,, | Performed by: FAMILY MEDICINE

## 2019-04-16 PROCEDURE — 80053 COMPREHEN METABOLIC PANEL: CPT

## 2019-04-16 PROCEDURE — 85652 RBC SED RATE AUTOMATED: CPT

## 2019-04-16 PROCEDURE — 3074F SYST BP LT 130 MM HG: CPT | Mod: CPTII,S$GLB,, | Performed by: FAMILY MEDICINE

## 2019-04-16 RX ORDER — NAPROXEN 500 MG/1
500 TABLET ORAL 2 TIMES DAILY
Qty: 30 TABLET | Refills: 0 | Status: SHIPPED | OUTPATIENT
Start: 2019-04-16 | End: 2022-10-19

## 2019-04-16 RX ORDER — HYDROCORTISONE VALERATE CREAM 2 MG/G
CREAM TOPICAL 2 TIMES DAILY
Qty: 15 G | Refills: 1 | Status: SHIPPED | OUTPATIENT
Start: 2019-04-16 | End: 2019-06-21 | Stop reason: SDUPTHER

## 2019-04-16 RX ORDER — MUPIROCIN 20 MG/G
OINTMENT TOPICAL 3 TIMES DAILY
Qty: 1 TUBE | Refills: 0 | Status: SHIPPED | OUTPATIENT
Start: 2019-04-16 | End: 2019-06-21 | Stop reason: SDUPTHER

## 2019-04-16 NOTE — PATIENT INSTRUCTIONS
"  Headache, Unspecified    A number of things can cause headaches. The cause of your headache isnt clear. But it doesnt seem to be a sign of any serious illness.  You could have a tension headache or a migraine headache.  Stress can cause a tension headache. This can happen if you tense the muscles of your shoulders, neck, and scalp without knowing it. If this stress lasts long enough, you may develop a tension headache.  It is not clear why migraines occur, but certain things called" triggers" can raise the risk of having a migraine attack. Migraine triggers may include emotional stress or depression, or by hormone changes during the menstrual cycle. Other triggers include birth control pills and other medicines, alcohol or caffeine, foods with tyramine (such as aged cheese, wine), eyestrain, weather changes, missed meals, and lack of sleep or oversleeping.  Other causes of headache include:  · Viral illness with high fever  · Head injury with concussion  · Sinus, ear, or throat infection  · Dental pain and jaw joint (TMJ) pain  More serious but less common causes of headache include stroke, brain hemorrhage, brain tumor, meningitis, and encephalitis.  Home care  Follow these tips when taking care of yourself at home:  · Dont drive yourself home if you were given pain medicine for your headache. Instead, have someone else drive you home. Try to sleep when you get home. You should feel much better when you wake up.  · Apply heat to the back of your neck to ease a neck muscle spasm. Take care of a migraine headache by putting an ice pack on your forehead or at the base of your skull.  · If you have nausea or vomiting, eat a light diet until your headache eases.  · If you have a migraine headache, use sunglasses when in the daylight or around bright indoor lighting until your symptoms get better. Bright glaring light can make this type of headache worse.  Follow-up care  Follow up with your healthcare provider, or " as advised. Talk with your provider if you have frequent headaches. He or she can help figure out a treatment plan. By knowing the earliest signs of headache, and starting treatment right away, you may be able to stop the pain yourself.  When to seek medical advice  Call your healthcare provider right away if any of these occur:  · Your head pain suddenly gets worse after sexual intercourse or strenuous activity  · Your head pain doesnt get better within 24 hours  · You arent able to keep liquids down (repeated vomiting)  · Fever of 100.4ºF (38ºC) or higher, or as directed by your healthcare provider  · Stiff neck  · Extreme drowsiness, confusion, or fainting  · Dizziness or dizziness with spinning sensation (vertigo)  · Weakness in an arm or leg or one side of your face  · You have trouble talking or seeing  Date Last Reviewed: 8/1/2016  © 4773-2964 [employer]. [address] [place] [address]. All rights reserved. This information is not intended as a substitute for professional medical care. Always follow your healthcare professional's instructions.        Self-Care for Headaches  Most headaches aren't serious and can be relieved with self-care. But some headaches may be a sign of another health problem like eye trouble or high blood pressure. To find the best treatment, learn what kind of headaches you get. For tension headaches, self-care will usually help. To treat migraines, ask your healthcare provider for advice. It is also possible to get both tension and migraine headaches. Self-care involves relieving the pain and avoiding headache triggers if you can.    Ways to reduce pain and tension  Try these steps:  · Apply a cold compress or ice pack to the pain site.  · Drink fluids. If nausea makes it hard to drink, try sucking on ice.  · Rest. Protect yourself from bright light and loud noises.  · Calm your emotions by imagining a peaceful scene.  · Massage tight neck, shoulder, and  "head muscles.  · To relax muscles, soak in a hot bath or use a hot shower.  Use medicines  Aspirin or aspirin substitutes, such as ibuprofen and acetaminophen, can relieve headache. Remember: Never give aspirin to anyone 18 years old or younger because of the risk of developing Reye syndrome. Use pain medicines only when necessary.  Track your headaches  Keeping a headache diary can help you and your healthcare provider identify what's causing your headaches:  · Note when each headache happens.  · Identify your activities and the foods you've eaten 6 to 8 hours before the headache began.  · Look for any trends or "triggers."  Signs of tension headache  Any of the following can be signs:  · Dull pain or feeling of pressure in a tight band around your head  · Pain in your neck or shoulders  · Headache without a definite beginning or end  · Headache after an activity such as driving or working on a computer  Signs of migraine  Any of the following can be signs:  · Throbbing pain on one or both sides of your head  · Nausea or vomiting  · Extreme sensitivity to light, sound, and smells  · Bright spots, flashes, or other visual changes  · Pain or nausea so severe that you can't continue your daily activities  Call your healthcare provider   If you have any of the following symptoms, contact your healthcare provider:  · A headache that lingers after a recent injury or bump to the head.  · A fever with a stiff neck or pain when you bend your head toward your chest.  · A headache along with slurred speech, changes in your vision, or numbness or weakness in your arms or legs.  · A headache for longer than 3 days.  · Frequent headaches, especially in the morning.  · Headaches with seizures   · Seek immediate medical attention if you have a headache that you would call "the worst headache you have ever had."   Date Last Reviewed: 10/4/2015  © 6322-9479 [employer]. [address] [place] [address], [place] [address]. All " rights reserved. This information is not intended as a substitute for professional medical care. Always follow your healthcare professional's instructions.

## 2019-04-16 NOTE — PROGRESS NOTES
Subjective:       Patient ID: Shahzad Feliz is a 58 y.o. female.    Chief Complaint: Hypertension and Dizziness    Here with HA frontal, post orbital, eyes feel strained/tight. Starting two nights ago. Also having tightness to chest, SOB. No cough/congestion/rhinorrhea.  States hse is under a lot of stress - mother  two weeks ago - her brothers were here and they had URIs.     She stayed in bed all day yesterday.  This AM she was going to go to work and then with a movement had sudden return of this pain.    She has been having active hives all spring. Taking Vit C and hist ex. Will only take the xyzal if really bad.    Headache    This is a new problem. The current episode started in the past 7 days. The problem occurs constantly. The pain is located in the frontal and retro-orbital region. The quality of the pain is described as aching. The pain is at a severity of 8/10 (6/10 currently). Associated symptoms include eye pain, muscle aches (all over), nausea, numbness (chronic to lower extremities), phonophobia and scalp tenderness. Pertinent negatives include no coughing, dizziness, drainage, fever, loss of balance, photophobia, rhinorrhea, sinus pressure, sore throat, tingling, vomiting, weakness or weight loss. Neck pain: stiff neck not pain. The symptoms are aggravated by noise and activity. She has tried nothing for the symptoms.     Review of Systems   Constitutional: Negative for fever and weight loss.   HENT: Negative for rhinorrhea, sinus pressure and sore throat.    Eyes: Positive for pain. Negative for photophobia.   Respiratory: Negative for cough.    Gastrointestinal: Positive for nausea. Negative for vomiting.   Musculoskeletal: Neck pain: stiff neck not pain.   Neurological: Positive for numbness (chronic to lower extremities) and headaches. Negative for dizziness, tingling, weakness, light-headedness and loss of balance.       Objective:      Physical Exam   Constitutional: She is oriented  to person, place, and time. She appears well-developed and well-nourished.   HENT:   Head: Normocephalic and atraumatic.   Right Ear: Tympanic membrane, external ear and ear canal normal.   Left Ear: Tympanic membrane, external ear and ear canal normal.   Nose: Nose normal.   Mouth/Throat: Oropharynx is clear and moist. No oropharyngeal exudate.   Midface mild TTP B maxillary and frontal sinus pts.   Eyes: Conjunctivae and EOM are normal.   Neck: Normal range of motion. Neck supple. No thyromegaly present.   Pain to endpoints ROM to ipsilateral side rotation and contralateral with lateral flexion  NTTP to paraspinals.     Cardiovascular: Normal rate, regular rhythm and normal heart sounds.   Pulmonary/Chest: Effort normal and breath sounds normal.   Lymphadenopathy:     She has no cervical adenopathy.   Neurological: She is alert and oriented to person, place, and time.   Skin: Skin is warm and dry.   Psychiatric: She has a normal mood and affect. Her behavior is normal.         Assessment/Plan:     1. Frontal headache  Sedimentation rate    C-reactive protein   2. Hypertension, essential  Comprehensive metabolic panel    Lipid panel    CBC auto differential   3. Neck pain, acute  naproxen (NAPROSYN) 500 MG tablet   4. Dermatitis  hydrocortisone (WESTCORT) 0.2 % cream    mupirocin (BACTROBAN) 2 % ointment   5. Wellness examination  Comprehensive metabolic panel    Lipid panel    CBC auto differential   use naproxen   NT temples B. Will check ESR/CRP but giant cell arteritis not felt likely.  She is asking for refills on top steroid for her dermatitis and for mupirocin for recurrent folliculitis.

## 2019-06-20 DIAGNOSIS — J30.89 NON-SEASONAL ALLERGIC RHINITIS, UNSPECIFIED TRIGGER: ICD-10-CM

## 2019-06-20 DIAGNOSIS — L30.9 DERMATITIS: ICD-10-CM

## 2019-06-20 DIAGNOSIS — Z12.11 SCREENING FOR COLON CANCER: Primary | ICD-10-CM

## 2019-06-20 RX ORDER — LEVOCETIRIZINE DIHYDROCHLORIDE 5 MG/1
5 TABLET, FILM COATED ORAL DAILY
Qty: 30 TABLET | Refills: 12 | Status: SHIPPED | OUTPATIENT
Start: 2019-06-20 | End: 2020-09-02 | Stop reason: SDUPTHER

## 2019-06-20 NOTE — TELEPHONE ENCOUNTER
Rx sent and colonoscopy order placed again per her request in note - placed last December 2018 initially.

## 2019-06-20 NOTE — TELEPHONE ENCOUNTER
----- Message from Anais Hinson sent at 6/20/2019  2:19 PM CDT -----  Contact: PATIENT  CALLING REGARDING NOT GETTING HER ALLERGY MEDICATIONS CALLED IN TO THE PHARMACY. PLEASE CALL PATIENT REGARDING MATTER @ [phone]. ALSO NEED ORDERS FOR COLONOSCOPY. PLEASE LEAVE A MESSAGE IF NO ANSWER. THANKS, [id number] - [place] - [address] [place] [facility] [place] [address]  [address] [place] [address]  Phone: [phone] Fax: [phone]

## 2019-06-20 NOTE — TELEPHONE ENCOUNTER
Pt request refill of xyzal.    Last OV 04/16/19      Tried to request refill properly thru Epic. System requesting a diagnosis.

## 2019-06-21 RX ORDER — HYDROCORTISONE VALERATE CREAM 2 MG/G
CREAM TOPICAL 2 TIMES DAILY
Qty: 15 G | Refills: 0 | Status: SHIPPED | OUTPATIENT
Start: 2019-06-21 | End: 2020-10-03 | Stop reason: SDUPTHER

## 2019-06-21 RX ORDER — MUPIROCIN 20 MG/G
OINTMENT TOPICAL 3 TIMES DAILY
Qty: 1 TUBE | Refills: 0 | Status: SHIPPED | OUTPATIENT
Start: 2019-06-21 | End: 2019-12-01 | Stop reason: SDUPTHER

## 2019-06-21 NOTE — PRE ADMISSION SCREENING
Endoscopy Scheduling Questionnaire:    1. Have you been admitted overnight to the hospital in the past 3 months? no  2. Do you get chest pain and SOB while walking up a flight of stairs? no  3. Have you had a cardiac stent placed in the past 12 months? no  4. Have you had a stroke or heart attack in the past 6 months? no  5. Have you had any chest pain in the past 3 months? no      If so, have you been evaluated by your PCP or Cardiologist? no  6. Do you take medication for weight loss? no  7. Have you been diagnosed with Diverticulitis within the past 3 months? no  8. Are you having any GI symptoms that you feel need to be evaluated prior to your procedure? no  9. Are you on dialysis? no  10. Are you diabetic? no  11. Do you have any other health issues that you feel might limit your ability to safely have the procedure and/or sedation? no  12. Is the patient over 81 yo? no        If so, has the patient been seen by their PCP or GI in the last 3 months? N/A       -I have reviewed the last colonoscopy for recommendations regarding surveillance and bowel prep  is NA  -I have reviewed the patient's medications and allergies. She is not on high risk medication, N/A A clearance request NA  -I have verified the pharmacy information. The prep being used is Miralax. The patient's prep instructions were sent via mail..    Date Endoscopy Scheduled: Date: 7/25/19

## 2019-06-21 NOTE — TELEPHONE ENCOUNTER
Spoke to patient and advised.  Patient verbally understood and then requested refills on mupirocin (BACTROBAN) 2 % ointment and hydrocortisone (WESTCORT) 0.2 % cream.  Please advise.

## 2019-06-24 NOTE — TELEPHONE ENCOUNTER
Called patient to advise that prescriptions were sent to the pharmacy.  Received no answer.  Left a message.

## 2019-07-11 DIAGNOSIS — R60.0 EDEMA EXTREMITIES: ICD-10-CM

## 2019-07-11 DIAGNOSIS — J30.89 ENVIRONMENTAL AND SEASONAL ALLERGIES: Primary | ICD-10-CM

## 2019-07-11 NOTE — TELEPHONE ENCOUNTER
Patient last seen on 04/16/19. Patient is requesting to have Zyrtec called to her pharmacy due to her insurance not covering her other allergy medication that was called in to her pharmacy.       Please Advise

## 2019-07-11 NOTE — TELEPHONE ENCOUNTER
----- Message from Josiane Mclean sent at 7/11/2019  3:43 PM CDT -----  Contact: [phone]  .Type:  RX Refill Request    Who Called: Shahzad Feliz    Refill or New Rx:Refill  RX Name and Strength:Triamterlene hctz 7.5 mg   How is the patient currently taking it? (ex. 1XDay)1xday   Is this a 30 day or 90 day RX:90 day   Preferred Pharmacy with phone number:.  [id number] - [place] - [address] [place] [facility] [address]  [address] [facility] [place] [address]  Phone: [phone] Fax: [phone]      Local or Mail Order:local   Ordering Provider:Dr. Bernal  Would the patient rather a call back or a response via [facility]? Call back   Best Call Back Number:[phone] or [phone]  Additional Information: pt also states that insurance doesn't cover Xazal, insurance will cover Certex.

## 2019-07-12 RX ORDER — TRIAMTERENE/HYDROCHLOROTHIAZID 37.5-25 MG
1 TABLET ORAL DAILY
Qty: 90 TABLET | Refills: 3 | Status: SHIPPED | OUTPATIENT
Start: 2019-07-12 | End: 2020-09-02 | Stop reason: SDUPTHER

## 2019-07-12 RX ORDER — MONTELUKAST SODIUM 10 MG/1
10 TABLET ORAL NIGHTLY
Qty: 30 TABLET | Refills: 12 | Status: SHIPPED | OUTPATIENT
Start: 2019-07-12 | End: 2019-08-11

## 2019-07-12 NOTE — TELEPHONE ENCOUNTER
Zyrtec is not on her formulary either. I did send rx for singulair - this is covered. Take every night. It takes 1-2 weeks to start working. Can take it chronically: every day all year round.

## 2019-07-12 NOTE — TELEPHONE ENCOUNTER
Patient informed of her Rx being sent to the pharmacy and verbally understood the information given.Patient is requesting to have Zyrte called to her pharmacy, due to her insurance not covering Xyzal.      Please Advise

## 2019-07-23 ENCOUNTER — TELEPHONE (OUTPATIENT)
Dept: ENDOSCOPY | Facility: HOSPITAL | Age: 59
End: 2019-07-23

## 2019-07-23 NOTE — TELEPHONE ENCOUNTER
----- Message from Kalli Abebe sent at 7/23/2019 10:54 AM CDT -----  Contact: pt  Pt needs the need the instructions for colonoscopy emailed to : [email or web address] please contact pt if able to e-mail  ... Call back: [phone] before 5 pm // [phone]

## 2019-07-23 NOTE — PRE-PROCEDURE INSTRUCTIONS
Reviewed Miralax prep, states understanding. 2nd prep at 0800. Clear liq 7/24. Arrive at the [facility]. Someone will call afternoon prior to procedure with arrival time

## 2019-07-24 ENCOUNTER — ANESTHESIA EVENT (OUTPATIENT)
Dept: ENDOSCOPY | Facility: HOSPITAL | Age: 59
End: 2019-07-24
Payer: COMMERCIAL

## 2019-07-24 NOTE — ANESTHESIA PREPROCEDURE EVALUATION
07/24/2019  Shahzad Feliz is a 58 y.o., female.    Anesthesia Evaluation    I have reviewed the Patient Summary Reports.    I have reviewed the Nursing Notes.   I have reviewed the Medications.     Review of Systems  Anesthesia Hx:  No problems with previous Anesthesia Denies Hx of Anesthetic complications  Denies Family Hx of Anesthesia complications.   Denies Personal Hx of Anesthesia complications.   Social:  Former Smoker    Cardiovascular:   Hypertension    Pulmonary:   Shortness of breath    Hepatic/GI:   GERD    Musculoskeletal:  Spine Disorders: Disc disease        Physical Exam  General:  Well nourished    Airway/Jaw/Neck:  Airway Findings: Mouth Opening: Normal Tongue: Normal  General Airway Assessment: Adult  Mallampati: I  TM Distance: Normal, at least 6 cm  Jaw/Neck Findings:  Neck ROM: Normal ROM  Neck Findings:     Eyes/Ears/Nose:  Eyes/Ears/Nose Findings:    Dental:  Dental Findings: In tact   Chest/Lungs:  Chest/Lungs Findings: Clear to auscultation, Normal Respiratory Rate     Heart/Vascular:  Heart Findings: Rate: Normal  Rhythm: Regular Rhythm  Sounds: Normal  Heart murmur: negative Vascular Findings: Normal    Abdomen:  Abdomen Findings: Normal    Musculoskeletal:  Musculoskeletal Findings: Normal   Skin:  Skin Findings: Normal    Mental Status:  Mental Status Findings:  Alert and Oriented         Anesthesia Plan  Type of Anesthesia, risks & benefits discussed:  Anesthesia Type:  general  Patient's Preference:   Intra-op Monitoring Plan: standard ASA monitors  Intra-op Monitoring Plan Comments:   Post Op Pain Control Plan: per primary service following discharge from PACU  Post Op Pain Control Plan Comments:   Induction:   IV  Beta Blocker:  Patient is not currently on a Beta-Blocker (No further documentation required).       Informed Consent: Patient understands risks and agrees  with Anesthesia plan.  Questions answered. Anesthesia consent signed with patient.  ASA Score: 2     Day of Surgery Review of History & Physical: I have interviewed and examined the patient. I have reviewed the patient's H&P dated:  There are no significant changes.          Ready For Surgery From Anesthesia Perspective.

## 2019-07-25 ENCOUNTER — HOSPITAL ENCOUNTER (OUTPATIENT)
Facility: HOSPITAL | Age: 59
Discharge: HOME | End: 2019-07-25
Attending: SURGERY | Admitting: SURGERY
Payer: COMMERCIAL

## 2019-07-25 ENCOUNTER — ANESTHESIA (OUTPATIENT)
Dept: ENDOSCOPY | Facility: HOSPITAL | Age: 59
End: 2019-07-25
Payer: COMMERCIAL

## 2019-07-25 VITALS
OXYGEN SATURATION: 99 % | WEIGHT: 173.19 LBS | HEART RATE: 50 BPM | SYSTOLIC BLOOD PRESSURE: 148 MMHG | BODY MASS INDEX: 24.25 KG/M2 | DIASTOLIC BLOOD PRESSURE: 67 MMHG | RESPIRATION RATE: 18 BRPM | HEIGHT: 71 IN | TEMPERATURE: 98 F

## 2019-07-25 DIAGNOSIS — Z12.11 COLON CANCER SCREENING: ICD-10-CM

## 2019-07-25 PROCEDURE — G0105 COLORECTAL SCRN; HI RISK IND: HCPCS | Performed by: SURGERY

## 2019-07-25 PROCEDURE — 63600175 PHARM REV CODE 636 W HCPCS: Performed by: NURSE ANESTHETIST, CERTIFIED REGISTERED

## 2019-07-25 PROCEDURE — D9220A PRA ANESTHESIA: Mod: CRNA,,, | Performed by: NURSE ANESTHETIST, CERTIFIED REGISTERED

## 2019-07-25 PROCEDURE — D9220A PRA ANESTHESIA: Mod: ANES,,, | Performed by: ANESTHESIOLOGY

## 2019-07-25 PROCEDURE — D9220A PRA ANESTHESIA: ICD-10-PCS | Mod: ANES,,, | Performed by: ANESTHESIOLOGY

## 2019-07-25 PROCEDURE — 37000008 HC ANESTHESIA 1ST 15 MINUTES: Performed by: SURGERY

## 2019-07-25 PROCEDURE — 37000009 HC ANESTHESIA EA ADD 15 MINS: Performed by: SURGERY

## 2019-07-25 PROCEDURE — D9220A PRA ANESTHESIA: ICD-10-PCS | Mod: CRNA,,, | Performed by: NURSE ANESTHETIST, CERTIFIED REGISTERED

## 2019-07-25 PROCEDURE — G0105 COLORECTAL SCRN; HI RISK IND: HCPCS | Mod: ,,, | Performed by: SURGERY

## 2019-07-25 PROCEDURE — G0105 COLORECTAL SCRN; HI RISK IND: ICD-10-PCS | Mod: ,,, | Performed by: SURGERY

## 2019-07-25 PROCEDURE — 63600175 PHARM REV CODE 636 W HCPCS: Performed by: ANESTHESIOLOGY

## 2019-07-25 RX ORDER — PROPOFOL 10 MG/ML
VIAL (ML) INTRAVENOUS
Status: DISCONTINUED | OUTPATIENT
Start: 2019-07-25 | End: 2019-07-25

## 2019-07-25 RX ORDER — ONDANSETRON 2 MG/ML
4 INJECTION INTRAMUSCULAR; INTRAVENOUS DAILY PRN
Status: DISCONTINUED | OUTPATIENT
Start: 2019-07-25 | End: 2019-07-25 | Stop reason: HOSPADM

## 2019-07-25 RX ORDER — SODIUM CHLORIDE 0.9 % (FLUSH) 0.9 %
3 SYRINGE (ML) INJECTION
Status: DISCONTINUED | OUTPATIENT
Start: 2019-07-25 | End: 2019-07-25 | Stop reason: HOSPADM

## 2019-07-25 RX ORDER — LIDOCAINE HCL/PF 100 MG/5ML
SYRINGE (ML) INTRAVENOUS
Status: DISCONTINUED | OUTPATIENT
Start: 2019-07-25 | End: 2019-07-25

## 2019-07-25 RX ORDER — LIDOCAINE HYDROCHLORIDE 10 MG/ML
1 INJECTION, SOLUTION EPIDURAL; INFILTRATION; INTRACAUDAL; PERINEURAL ONCE
Status: DISCONTINUED | OUTPATIENT
Start: 2019-07-25 | End: 2019-07-25 | Stop reason: HOSPADM

## 2019-07-25 RX ORDER — SODIUM CHLORIDE, SODIUM LACTATE, POTASSIUM CHLORIDE, CALCIUM CHLORIDE 600; 310; 30; 20 MG/100ML; MG/100ML; MG/100ML; MG/100ML
INJECTION, SOLUTION INTRAVENOUS CONTINUOUS
Status: DISCONTINUED | OUTPATIENT
Start: 2019-07-25 | End: 2019-07-25 | Stop reason: HOSPADM

## 2019-07-25 RX ADMIN — PROPOFOL 30 MG: 10 INJECTION, EMULSION INTRAVENOUS at 11:07

## 2019-07-25 RX ADMIN — PROPOFOL 80 MG: 10 INJECTION, EMULSION INTRAVENOUS at 11:07

## 2019-07-25 RX ADMIN — LIDOCAINE HYDROCHLORIDE 80 MG: 20 INJECTION, SOLUTION INTRAVENOUS at 11:07

## 2019-07-25 RX ADMIN — PROPOFOL 40 MG: 10 INJECTION, EMULSION INTRAVENOUS at 11:07

## 2019-07-25 RX ADMIN — PROPOFOL 30 MG: 10 INJECTION, EMULSION INTRAVENOUS at 12:07

## 2019-07-25 RX ADMIN — SODIUM CHLORIDE, SODIUM LACTATE, POTASSIUM CHLORIDE, AND CALCIUM CHLORIDE: 600; 310; 30; 20 INJECTION, SOLUTION INTRAVENOUS at 11:07

## 2019-07-25 NOTE — ANESTHESIA POSTPROCEDURE EVALUATION
Anesthesia Post Evaluation    Patient: Shahzad Feliz    Procedure(s) Performed: Procedure(s) (LRB):  COLONOSCOPY (N/A)    Final Anesthesia Type: general  Patient location during evaluation: PACU  Patient participation: Yes- Able to Participate  Level of consciousness: awake and alert and oriented  Post-procedure vital signs: reviewed and stable  Pain management: adequate  Airway patency: patent  PONV status at discharge: No PONV  Anesthetic complications: no      Cardiovascular status: blood pressure returned to baseline, stable and hemodynamically stable  Respiratory status: unassisted  Hydration status: euvolemic  Follow-up not needed.          Vitals Value Taken Time   /76 7/25/2019 12:14 PM   Temp 36.6 °C (97.9 °F) 7/25/2019 10:45 AM   Pulse 63 7/25/2019 12:19 PM   Resp 46 7/25/2019 12:19 PM   SpO2 99 % 7/25/2019 12:19 PM   Vitals shown include unvalidated device data.      No case tracking events are documented in the log.      Pain/Shanti Score: Shanti Score: 10 (7/25/2019 12:10 PM)

## 2019-07-25 NOTE — TRANSFER OF CARE
"Anesthesia Transfer of Care Note    Patient: Shahzad Feliz    Procedure(s) Performed: Procedure(s) (LRB):  COLONOSCOPY (N/A)    Patient location: PACU    Anesthesia Type: general    Transport from OR: Transported from OR on room air with adequate spontaneous ventilation    Post pain: adequate analgesia    Post assessment: no apparent anesthetic complications and tolerated procedure well    Post vital signs: stable    Level of consciousness: awake and oriented    Nausea/Vomiting: no nausea/vomiting    Complications: none    Transfer of care protocol was followed      Last vitals:   Visit Vitals  /88 (BP Location: Left arm, Patient Position: Sitting)   Pulse 80   Temp 36.6 °C (97.9 °F) (Oral)   Resp 18   Ht 5' 11" (1.803 m)   Wt 78.5 kg (173 lb 2.7 oz)   SpO2 99%   Breastfeeding? No   BMI 24.15 kg/m²     "

## 2019-07-25 NOTE — [employer]ATION PATIENT INSTRUCTIONS
Discharge Summary/Instructions after an Endoscopic Procedure  Patient Name: Shahzad Feliz  Patient MRN: [id number]  Patient YOB: 1960 Thursday, July 25, 2019  Domenico Howard MD  RESTRICTIONS:  During your procedure today, you received medications for sedation.  These   medications may affect your judgment, balance and coordination.  Therefore,   for 24 hours, you have the following restrictions:   - DO NOT drive a car, operate machinery, make legal/financial decisions,   sign important papers or drink alcohol.    ACTIVITY:  Today: no heavy lifting, straining or running due to procedural   sedation/anesthesia.  The following day: return to full activity including work.  DIET:  Eat and drink normally unless instructed otherwise.     TREATMENT FOR COMMON SIDE EFFECTS:  - Mild abdominal pain, nausea, belching, bloating or excessive gas:  rest,   eat lightly and use a heating pad.  - Sore Throat: treat with throat lozenges and/or gargle with warm salt   water.  - Because air was used during the procedure, expelling large amounts of air   from your rectum or belching is normal.  - If a bowel prep was taken, you may not have a bowel movement for 1-3 days.    This is normal.  SYMPTOMS TO WATCH FOR AND REPORT TO YOUR PHYSICIAN:  1. Abdominal pain or bloating, other than gas cramps.  2. Chest pain.  3. Back pain.  4. Signs of infection such as: chills or fever occurring within 24 hours   after the procedure.  5. Rectal bleeding, which would show as bright red, maroon, or black stools.   (A tablespoon of blood from the rectum is not serious, especially if   hemorrhoids are present.)  6. Vomiting.  7. Weakness or dizziness.  GO DIRECTLY TO THE NEAREST EMERGENCY ROOM IF YOU HAVE ANY OF THE FOLLOWING:      Difficulty breathing              Chills and/or fever over 101 F   Persistent vomiting and/or vomiting blood   Severe abdominal pain   Severe chest pain   Black, tarry stools   Bleeding- more than one  tablespoon   Any other symptom or condition that you feel may need urgent attention  Your doctor recommends these additional instructions:  If any biopsies were taken, your doctors clinic will contact you in 1 to 2   weeks with any results.  - Patient has a contact number available for emergencies.  The signs and   symptoms of potential delayed complications were discussed with the   patient.  Return to normal activities tomorrow.  Written discharge   instructions were provided to the patient.   - Resume previous diet.   - Continue present medications.   - Repeat colonoscopy in 5 years for surveillance.   - Return to referring physician as previously scheduled.   - Discharge patient to home.  For questions, problems or results please call your physician Domenico Howard MD at Work:  [phone]  If you have any questions about the above instructions, call the GI   department at [phone] or call the endoscopy unit at [phone]   from 7am until 3 pm.  [facility] [place], EMERGENCY ROOM PHONE NUMBER:   [phone]  IF A COMPLICATION OR EMERGENCY SITUATION ARISES AND YOU ARE UNABLE TO REACH   YOUR PHYSICIAN - GO DIRECTLY TO THE EMERGENCY ROOM.  I have read or have had read to me these discharge instructions for my   procedure and have received a written copy.  I understand these   instructions and will follow-up with my physician if I have any questions.     __________________________________       _____________________________________  Nurse Signature                                          Patient/Designated   Responsible Party Signature  Domenico Howard MD  7/25/2019 12:07:25 PM  This report has been verified and signed electronically.  [employer]

## 2019-07-25 NOTE — H&P
Endoscopy H&P    Procedure : Colonoscopy      personal history of colon polyps and most recent endoscopic exam 5 years ago      Past Medical History:   Diagnosis Date    Bone spur     disc    Bulging disc     Hypertension      Past Surgical History:   Procedure Laterality Date    COLONOSCOPY W/ BIOPSIES AND POLYPECTOMY  12/17/14    tubular adenoma, hyperplastic polyp, repeat 3 years    HYSTERECTOMY      OVARIAN CYST REMOVAL       No current facility-administered medications on file prior to encounter.      Current Outpatient Medications on File Prior to Encounter   Medication Sig Dispense Refill    ASCORBIC ACID (WILLIAM-C ORAL) Take by mouth.      b complex vitamins capsule Take 1 capsule by mouth once daily.      cholecalciferol, vitamin D3, 1,000 unit/spray SpSn Place 2,000 Units under the tongue.       estrogens, conjugated, (PREMARIN) 0.625 MG tablet Take 0.625 mg by mouth once daily.      fish oil-omega-3 fatty acids 300-1,000 mg capsule Take 1 capsule by mouth once daily.      multivitamin (ONE DAILY MULTIVITAMIN) per tablet Take 1 tablet by mouth once daily.      clobetasol (TEMOVATE) 0.05 % cream Apply topically 2 (two) times daily. To affected area 15 g 0    famotidine (PEPCID) 40 MG tablet Take 1 tablet (40 mg total) by mouth once daily. (Patient taking differently: Take 40 mg by mouth nightly as needed. ) 30 tablet 11    hydrocortisone (WESTCORT) 0.2 % cream Apply topically 2 (two) times daily. To affected area for 7 days 15 g 0    hydrocortisone-pramoxine (PROCTOFOAM-HS) rectal foam Place 1 applicator rectally 2 (two) times daily. 1 applicator 0    levocetirizine (XYZAL) 5 MG tablet Take 1 tablet (5 mg total) by mouth once daily. 30 tablet 12    mupirocin (BACTROBAN) 2 % ointment Apply topically 3 (three) times daily. 1 Tube 0    naproxen (NAPROSYN) 500 MG tablet Take 1 tablet (500 mg total) by mouth 2 (two) times daily. As needed. 30 tablet 0    oxybutynin (DITROPAN) 5 MG Tab Take 1  tablet (5 mg total) by mouth 3 (three) times daily. 90 tablet 11     Review of patient's allergies indicates:   Allergen Reactions    Latex, natural rubber     Pcn [penicillins] Other (See Comments)     Passed out and seizures             Review of Systems -ROS:  GENERAL: No fever, chills, fatigability or weight loss.  CHEST: Denies HUNT[place], cyanosis, wheezing, cough and sputum production.  CARDIOVASCULAR: Denies chest pain, PND, orthopnea or reduced exercise tolerance.   Musculoskeletal ROS: negative for - gait disturbance or joint pain  Neurological ROS: negative for - confusion or memory loss        Physical Exam:  General: well developed, well nourished, no distress  Head: normocephalic  Neck: supple, symmetrical, trachea midline  Lungs:  clear to auscultation bilaterally and normal respiratory effort  Heart: regular rate and rhythm, S1, S2 normal, no murmur, rub or gallop and regular rate and rhythm  Abdomen: soft, non-tender non-distented; bowel sounds normal; no masses,  no organomegaly  Extremities: no cyanosis or edema, or clubbing       Moderate Sedation (choice): Mallampati Score 1    ASA : II    IMP: personal history of colon polyps and most recent endoscopic exam 5 years ago    Plan: Colonoscopy with Moderate sedation.  I have explained the procedure including indications, alternatives, expected outcomes and potential complications. The patient appears to understand and gives informed consent. The patient is medically ready for surgery.

## 2019-07-25 NOTE — BRIEF OP NOTE
[facility]  Brief Operative Note     SUMMARY     Surgery Date: 7/25/2019     Surgeon(s) and Role:     * Domenico Howard MD - Primary    Assisting Surgeon: None    Pre-op Diagnosis:  Screening [Z13.9]  History of colon polyps [Z86.010]    Post-op Diagnosis:  Post-Op Diagnosis Codes:     * Screening [Z13.9]     * History of colon polyps [Z86.010]    Procedure(s) (LRB):  COLONOSCOPY (N/A)    Anesthesia: Choice    Description of the findings of the procedure: colonoscopy    Findings/Key Components: see op note    Estimated Blood Loss: * No values recorded between 7/25/2019 12:00 AM and 7/25/2019 11:03 AM *         Specimens:   Specimen (12h ago, onward)    None          Discharge Note    SUMMARY     Admit Date: 7/25/2019    Discharge Date and Time:  07/25/2019 12:07 PM    Hospital Course The patient underwent colonoscopy and was discharged post op.    Final Diagnosis: Post-Op Diagnosis Codes:     * Screening [Z13.9]     * History of colon polyps [Z86.010]    Disposition: Home or Self Care    Follow Up/Patient Instructions:     Medications:  Reconciled Home Medications:      Medication List      CHANGE how you take these medications    famotidine 40 MG tablet  Commonly known as:  PEPCID  Take 1 tablet (40 mg total) by mouth once daily.  What changed:    · when to take this  · reasons to take this        CONTINUE taking these medications    b complex vitamins capsule  Take 1 capsule by mouth once daily.     cholecalciferol (vitamin D3) 1,000 unit/spray Spsn  Place 2,000 Units under the tongue.     clobetasol 0.05 % cream  Commonly known as:  TEMOVATE  Apply topically 2 (two) times daily. To affected area     estrogens (conjugated) 0.625 MG tablet  Commonly known as:  PREMARIN  Take 0.625 mg by mouth once daily.     fish oil-omega-3 fatty acids 300-1,000 mg capsule  Take 1 capsule by mouth once daily.     hydrocortisone 0.2 % cream  Commonly known as:  WESTCORT  Apply topically 2 (two) times daily. To affected  area for 7 days     hydrocortisone-pramoxine rectal foam  Commonly known as:  PROCTOFOAM-HS  Place 1 applicator rectally 2 (two) times daily.     montelukast 10 mg tablet  Commonly known as:  SINGULAIR  Take 1 tablet (10 mg total) by mouth every evening.     mupirocin 2 % ointment  Commonly known as:  BACTROBAN  Apply topically 3 (three) times daily.     naproxen 500 MG tablet  Commonly known as:  NAPROSYN  Take 1 tablet (500 mg total) by mouth 2 (two) times daily. As needed.     ONE DAILY MULTIVITAMIN per tablet  Generic drug:  multivitamin  Take 1 tablet by mouth once daily.     triamterene-hydrochlorothiazide 37.5-25 mg 37.5-25 mg per tablet  Commonly known as:  MAXZIDE-25  TAKE 1 TABLET BY MOUTH ONCE DAILY     WILLIAM-C ORAL  Take by mouth.        ASK your doctor about these medications    levocetirizine 5 MG tablet  Commonly known as:  XYZAL  Take 1 tablet (5 mg total) by mouth once daily.     oxybutynin 5 MG Tab  Commonly known as:  DITROPAN  Take 1 tablet (5 mg total) by mouth 3 (three) times daily.          Discharge Procedure Orders   Notify your health care provider if you experience any of the following:  temperature >100.4     Notify your health care provider if you experience any of the following:  persistent nausea and vomiting or diarrhea     Notify your health care provider if you experience any of the following:  severe uncontrolled pain     Notify your health care provider if you experience any of the following:  redness, tenderness, or signs of infection (pain, swelling, redness, odor or green/yellow discharge around incision site)     Notify your health care provider if you experience any of the following:  difficulty breathing or increased cough     Notify your health care provider if you experience any of the following:  severe persistent headache     Notify your health care provider if you experience any of the following:  worsening rash     Notify your health care provider if you experience any  of the following:  persistent dizziness, light-headedness, or visual disturbances     Notify your health care provider if you experience any of the following:  increased confusion or weakness     Activity as tolerated     Follow-up Information     Domenico Howard MD.    Specialties:  General Surgery, Bariatrics  Why:  As needed  Contact information:  [address] [address] [id number] [phone]

## 2019-07-25 NOTE — DISCHARGE INSTRUCTIONS

## 2019-08-14 ENCOUNTER — TELEPHONE (OUTPATIENT)
Dept: INTERNAL MEDICINE | Facility: CLINIC | Age: 59
End: 2019-08-14

## 2019-08-14 NOTE — TELEPHONE ENCOUNTER
----- Message from Nicolasa Allison sent at 8/14/2019 12:13 PM CDT -----  Contact: pt  she's calling in regards to RX being called in ,      [id number] - [place] - [address] AT [facility] [address]  [address] [place] [address]  Phone: [phone] Fax: [phone]        pls call pt back at [phone] (home) [phone] (work)

## 2019-08-14 NOTE — TELEPHONE ENCOUNTER
Tried to contact pt on home phone. Received no answer. No option to leave VM. Contacted pt on work phone. No answer VM left.

## 2019-08-27 ENCOUNTER — PATIENT OUTREACH (OUTPATIENT)
Dept: ADMINISTRATIVE | Facility: HOSPITAL | Age: 59
End: 2019-08-27

## 2019-12-01 DIAGNOSIS — L30.9 DERMATITIS: ICD-10-CM

## 2019-12-01 RX ORDER — MUPIROCIN 20 MG/G
OINTMENT TOPICAL
Qty: 22 G | Refills: 0 | Status: SHIPPED | OUTPATIENT
Start: 2019-12-01 | End: 2020-10-03 | Stop reason: SDUPTHER

## 2019-12-06 ENCOUNTER — TELEPHONE (OUTPATIENT)
Dept: INTERNAL MEDICINE | Facility: CLINIC | Age: 59
End: 2019-12-06

## 2019-12-06 NOTE — TELEPHONE ENCOUNTER
Attempt to call the patient to let her know the prior authorization did not get through, it says patient inactive. Received no answer, no voicemail set up.

## 2020-01-07 ENCOUNTER — OFFICE VISIT (OUTPATIENT)
Dept: INTERNAL MEDICINE | Facility: CLINIC | Age: 60
End: 2020-01-07
Payer: COMMERCIAL

## 2020-01-07 VITALS
TEMPERATURE: 98 F | DIASTOLIC BLOOD PRESSURE: 88 MMHG | BODY MASS INDEX: 23.72 KG/M2 | HEIGHT: 72 IN | HEART RATE: 72 BPM | WEIGHT: 175.13 LBS | SYSTOLIC BLOOD PRESSURE: 132 MMHG | OXYGEN SATURATION: 98 %

## 2020-01-07 DIAGNOSIS — H01.004 BLEPHARITIS OF LEFT UPPER EYELID, UNSPECIFIED TYPE: Primary | ICD-10-CM

## 2020-01-07 PROCEDURE — 3079F DIAST BP 80-89 MM HG: CPT | Mod: CPTII,S$GLB,, | Performed by: FAMILY MEDICINE

## 2020-01-07 PROCEDURE — 3075F SYST BP GE 130 - 139MM HG: CPT | Mod: CPTII,S$GLB,, | Performed by: FAMILY MEDICINE

## 2020-01-07 PROCEDURE — 3079F PR MOST RECENT DIASTOLIC BLOOD PRESSURE 80-89 MM HG: ICD-10-PCS | Mod: CPTII,S$GLB,, | Performed by: FAMILY MEDICINE

## 2020-01-07 PROCEDURE — 3008F BODY MASS INDEX DOCD: CPT | Mod: CPTII,S$GLB,, | Performed by: FAMILY MEDICINE

## 2020-01-07 PROCEDURE — 99999 PR PBB SHADOW E&M-EST. PATIENT-LVL IV: CPT | Mod: PBBFAC,,, | Performed by: FAMILY MEDICINE

## 2020-01-07 PROCEDURE — 99999 PR PBB SHADOW E&M-EST. PATIENT-LVL IV: ICD-10-PCS | Mod: PBBFAC,,, | Performed by: FAMILY MEDICINE

## 2020-01-07 PROCEDURE — 3075F PR MOST RECENT SYSTOLIC BLOOD PRESS GE 130-139MM HG: ICD-10-PCS | Mod: CPTII,S$GLB,, | Performed by: FAMILY MEDICINE

## 2020-01-07 PROCEDURE — 3008F PR BODY MASS INDEX (BMI) DOCUMENTED: ICD-10-PCS | Mod: CPTII,S$GLB,, | Performed by: FAMILY MEDICINE

## 2020-01-07 PROCEDURE — 99213 PR OFFICE/OUTPT VISIT, EST, LEVL III, 20-29 MIN: ICD-10-PCS | Mod: S$GLB,,, | Performed by: FAMILY MEDICINE

## 2020-01-07 PROCEDURE — 99213 OFFICE O/P EST LOW 20 MIN: CPT | Mod: S$GLB,,, | Performed by: FAMILY MEDICINE

## 2020-01-07 RX ORDER — DOXYCYCLINE 100 MG/1
100 CAPSULE ORAL EVERY 12 HOURS
Qty: 14 CAPSULE | Refills: 0 | Status: SHIPPED | OUTPATIENT
Start: 2020-01-07 | End: 2020-01-14

## 2020-01-07 NOTE — PATIENT INSTRUCTIONS
Treating Blepharitis: Medicine and Follow-Up  Medicine    Your eye doctor may prescribe eye drops or an ointment. These will help ease redness, swelling, and irritation. When using these medicines, make sure that the tip of the tube or bottle doesnt touch your eyelid. Your doctor may also prescribe oral antibiotics or an antibiotic ointment. Or he or she may prescribe eye drops with cortisone. These medicines can help clear up a bacterial infection, a cyst, or a stye. If you take medicine, you must still use warm compresses and eyelid scrubs as recommended by your doctor.  Follow-up appointments  Your eye doctor needs to recheck your eyes during your treatment. This is to make sure the redness and swelling (inflammation) is under control. Regular eye exams are also the best way to prevent other eye problems. Many eye diseases have no symptoms until the eye is already damaged. Finding and treating a problem early can help prevent something more severe.  Date Last Reviewed: 6/6/2015  © 3160-7555 [employer]. [address] [place] [address], [place] [address]. All rights reserved. This information is not intended as a substitute for professional medical care. Always follow your healthcare professional's instructions.      Schedule apt for follow-up with sunil Spivey.

## 2020-01-07 NOTE — LETTER
January 7, 2020        [facility]  [address] [place] [address] [address] [place] [place] [address]  Phone: [phone]  Fax: [phone]       Patient: Shahzad Feliz   YOB: 1960  Date of Visit: 01/07/2020        To Whom It May Concern:    Sandra Feliz  was at [facility] on 01/07/2020.She may return to work on 01/09/20. If you have any questions or concerns, or if I can be of further assistance, please do not hesitate to call the clinic @ [phone].    Sincerely,        John Gomez MA

## 2020-01-07 NOTE — PROGRESS NOTES
"Subjective:       Patient ID: Shahzad Feliz is a 59 y.o. female.    Chief Complaint: Belepharitis and Medication Refill    Left eyelid swelling for last 4 days. Upper lid getting worse. Used warm compresses, stabbing pain, burning sensation into her nose. Cold helped a little. Pt states not using eye makeup recently 2/2 holidays.    She saw her ophthalmologist a month ago - had "dry spots" and was rxd Systane and an abx drop (could not get the abx).      Review of Systems   Constitutional: Negative for fever.   Eyes: Positive for pain and redness. Negative for visual disturbance.       Objective:      Physical Exam   Constitutional: She is oriented to person, place, and time. She appears well-developed.   HENT:   Head: Normocephalic and atraumatic.   Eyes:   Swelling to OS upper lid. No papular or pustular localization   Cardiovascular: Normal rate, regular rhythm and normal heart sounds.   Pulmonary/Chest: Effort normal and breath sounds normal.   Neurological: She is alert and oriented to person, place, and time.   Skin: Skin is warm and dry.   Psychiatric: She has a normal mood and affect. Her behavior is normal.         Assessment/Plan:     1. Blepharitis of left upper eyelid, unspecified type  doxycycline (MONODOX) 100 MG capsule    Oral antibiotics, compresses, see her Ophthalmologist.  TIMOTEO signed for Dr. Lord at [facility] for [facility]. Has appt 2/4/2020    "

## 2020-01-31 ENCOUNTER — PATIENT OUTREACH (OUTPATIENT)
Dept: ADMINISTRATIVE | Facility: HOSPITAL | Age: 60
End: 2020-01-31

## 2020-03-03 RX ORDER — DOXYCYCLINE 50 MG/1
50 CAPSULE ORAL
OUTPATIENT
Start: 2020-03-03

## 2020-04-29 ENCOUNTER — TELEPHONE (OUTPATIENT)
Dept: INTERNAL MEDICINE | Facility: CLINIC | Age: 60
End: 2020-04-29

## 2020-04-29 NOTE — TELEPHONE ENCOUNTER
----- Message from Florence Martinez sent at 4/29/2020 12:54 PM CDT -----  Contact: patient  Says she needs 90day prescriptions faxed to her pharmacy now.     Need them faxed to [facility] pharmacy. Their # [phone]      Thanks  KB

## 2020-05-29 ENCOUNTER — PATIENT OUTREACH (OUTPATIENT)
Dept: ADMINISTRATIVE | Facility: HOSPITAL | Age: 60
End: 2020-05-29

## 2020-05-29 NOTE — LETTER
May 29, 2020      We are seeing Shahzad Feliz, 1960, at [facility]. Roseanne Bernal MD is their primary care physician. To help with our [place] maintenance records could you please send the following:     Mammogram     Please fax to [facility] at [phone], attention Shantelle Benton.      Thank-you in advance for your assistance. If you have any questions or concerns please contact me at [phone].     Shantelle Benton MA  Care Coordination Department  [facility]   [phone]

## 2020-09-02 DIAGNOSIS — R60.0 EDEMA OF EXTREMITIES: ICD-10-CM

## 2020-09-02 DIAGNOSIS — J30.89 NON-SEASONAL ALLERGIC RHINITIS, UNSPECIFIED TRIGGER: ICD-10-CM

## 2020-09-02 RX ORDER — TRIAMTERENE/HYDROCHLOROTHIAZID 37.5-25 MG
1 TABLET ORAL DAILY
Qty: 90 TABLET | Refills: 1 | Status: SHIPPED | OUTPATIENT
Start: 2020-09-02 | End: 2021-02-12

## 2020-09-02 RX ORDER — LEVOCETIRIZINE DIHYDROCHLORIDE 5 MG/1
5 TABLET, FILM COATED ORAL DAILY
Qty: 30 TABLET | Refills: 12 | Status: SHIPPED | OUTPATIENT
Start: 2020-09-02 | End: 2021-10-13 | Stop reason: SDUPTHER

## 2020-09-02 NOTE — TELEPHONE ENCOUNTER
----- Message from Marlen Nick sent at 9/2/2020  1:19 PM CDT -----  Regarding: refill  Contact: [phone]  Would like to consult with the nurse, patient would like to get a refill on her Rx medication, Please call back thanks sj   .Type:  RX Refill Request    Who Called:  ms cornelius  Refill or New Rx:refill  RX Name and Strength:Blood pressure  How is the patient currently taking it? (ex. 1XDay):once a day  Is this a 30 day or 90 day RX:90  Preferred Pharmacy with phone number:. [facility]  [phone]    Local or Mail [facility]  Ordering Provider:Dr holm  Would the patient rather a call back or a response via [facility]? callback  Best Call Back Number:[phone]  Additional Information:

## 2020-10-03 ENCOUNTER — OFFICE VISIT (OUTPATIENT)
Dept: INTERNAL MEDICINE | Facility: CLINIC | Age: 60
End: 2020-10-03
Payer: COMMERCIAL

## 2020-10-03 VITALS
TEMPERATURE: 97 F | WEIGHT: 171.94 LBS | DIASTOLIC BLOOD PRESSURE: 76 MMHG | SYSTOLIC BLOOD PRESSURE: 126 MMHG | BODY MASS INDEX: 23.29 KG/M2 | OXYGEN SATURATION: 96 % | HEIGHT: 72 IN | HEART RATE: 76 BPM

## 2020-10-03 DIAGNOSIS — Z11.4 SCREENING FOR HIV WITHOUT PRESENCE OF RISK FACTORS: ICD-10-CM

## 2020-10-03 DIAGNOSIS — K64.9 HEMORRHOIDS, UNSPECIFIED HEMORRHOID TYPE: ICD-10-CM

## 2020-10-03 DIAGNOSIS — H81.10 BENIGN PAROXYSMAL POSITIONAL VERTIGO, UNSPECIFIED LATERALITY: ICD-10-CM

## 2020-10-03 DIAGNOSIS — I10 HYPERTENSION, ESSENTIAL: ICD-10-CM

## 2020-10-03 DIAGNOSIS — R20.2 PARESTHESIA OF RIGHT UPPER AND LOWER EXTREMITY: ICD-10-CM

## 2020-10-03 DIAGNOSIS — L30.9 DERMATITIS: ICD-10-CM

## 2020-10-03 DIAGNOSIS — Z00.00 WELLNESS EXAMINATION: Primary | ICD-10-CM

## 2020-10-03 PROCEDURE — 99999 PR PBB SHADOW E&M-EST. PATIENT-LVL IV: ICD-10-PCS | Mod: PBBFAC,,, | Performed by: FAMILY MEDICINE

## 2020-10-03 PROCEDURE — 99396 PR PREVENTIVE VISIT,EST,40-64: ICD-10-PCS | Mod: S$GLB,,, | Performed by: FAMILY MEDICINE

## 2020-10-03 PROCEDURE — 3074F SYST BP LT 130 MM HG: CPT | Mod: CPTII,S$GLB,, | Performed by: FAMILY MEDICINE

## 2020-10-03 PROCEDURE — 3074F PR MOST RECENT SYSTOLIC BLOOD PRESSURE < 130 MM HG: ICD-10-PCS | Mod: CPTII,S$GLB,, | Performed by: FAMILY MEDICINE

## 2020-10-03 PROCEDURE — 99999 PR PBB SHADOW E&M-EST. PATIENT-LVL IV: CPT | Mod: PBBFAC,,, | Performed by: FAMILY MEDICINE

## 2020-10-03 PROCEDURE — 3008F BODY MASS INDEX DOCD: CPT | Mod: CPTII,S$GLB,, | Performed by: FAMILY MEDICINE

## 2020-10-03 PROCEDURE — 3078F PR MOST RECENT DIASTOLIC BLOOD PRESSURE < 80 MM HG: ICD-10-PCS | Mod: CPTII,S$GLB,, | Performed by: FAMILY MEDICINE

## 2020-10-03 PROCEDURE — 3078F DIAST BP <80 MM HG: CPT | Mod: CPTII,S$GLB,, | Performed by: FAMILY MEDICINE

## 2020-10-03 PROCEDURE — 3008F PR BODY MASS INDEX (BMI) DOCUMENTED: ICD-10-PCS | Mod: CPTII,S$GLB,, | Performed by: FAMILY MEDICINE

## 2020-10-03 PROCEDURE — 99396 PREV VISIT EST AGE 40-64: CPT | Mod: S$GLB,,, | Performed by: FAMILY MEDICINE

## 2020-10-03 RX ORDER — HYDROCORTISONE VALERATE CREAM 2 MG/G
CREAM TOPICAL 2 TIMES DAILY
Qty: 15 G | Refills: 0 | Status: SHIPPED | OUTPATIENT
Start: 2020-10-03 | End: 2021-06-28

## 2020-10-03 RX ORDER — HYDROCORTISONE ACETATE 25 MG/1
25 SUPPOSITORY RECTAL 2 TIMES DAILY PRN
Qty: 20 SUPPOSITORY | Refills: 0 | Status: SHIPPED | OUTPATIENT
Start: 2020-10-03 | End: 2021-06-28

## 2020-10-03 RX ORDER — MUPIROCIN 20 MG/G
OINTMENT TOPICAL
Qty: 22 G | Refills: 0 | Status: SHIPPED | OUTPATIENT
Start: 2020-10-03 | End: 2021-10-26 | Stop reason: SDUPTHER

## 2020-10-03 NOTE — PROGRESS NOTES
Subjective:       Patient ID: Shahzad Feliz is a 60 y.o. female.    Chief Complaint: Annual Exam    Patient with hypertension, hyperlipidemia here for annual wellness.   Health Maintenance Due:    HIV Screening due on 09/24/1974 - order  Shingles Vaccine(1 of 2) due on 09/24/2009 - advise  Influenza Vaccine(1) due on 08/01/2020 - offer    Lab Results       Component                Value               Date                       WBC                      7.97                04/16/2019                 HGB                      15.3                04/16/2019                 HCT                      46.6                04/16/2019                 PLT                      246                 04/16/2019                 CHOL                     257 (H)             04/16/2019                 TRIG                     329 (H)             04/16/2019                 HDL                      57                  04/16/2019                 LDLCALC                  134.2               04/16/2019                 ALT                      19                  04/16/2019                 AST                      20                  04/16/2019                 NA                       137                 04/16/2019                 K                        3.8                 04/16/2019                 CL                       99                  04/16/2019                 CALCIUM                  9.7                 04/16/2019                 CREATININE               0.8                 04/16/2019                 BUN                      9                   04/16/2019                 CO2                      26                  04/16/2019                 TSH                      2.003               09/18/2015                 GLU                      121 (H)             04/16/2019                 ESTGFRAFRICA             >60.0               04/16/2019                 EGFRNONAA                >60.0               04/16/2019              BP  Readings from Last 3 Encounters:  01/07/20 : 132/88  07/25/19 : (!) 148/67  04/16/19 : 124/78  Today: 126/76 - controlled  Hypertension Medications        triamterene-hydrochlorothiazide 37.5-25 mg (MAXZIDE-25) 37.5-25 mg per tablet Take 1 tablet by mouth once daily.        Lab Results - diet alone:       Component                Value               Date                       CHOL                     257 (H)             04/16/2019                 CHOL                     232 (H)             01/31/2017                 CHOL                     231 (H)             09/18/2015            Lab Results       Component                Value               Date                       HDL                      57                  04/16/2019                 HDL                      57                  01/31/2017                 HDL                      54                  09/18/2015            Lab Results       Component                Value               Date                       LDLCALC                  134.2               04/16/2019                 LDLCALC                  137.4               01/31/2017                 LDLCALC                  130.4               09/18/2015            Lab Results       Component                Value               Date                       TRIG                     329 (H)             04/16/2019                 TRIG                     188 (H)             01/31/2017                 TRIG                     233 (H)             09/18/2015                         Health Maintenance Summary                HIV Screening Overdue 9/24/1974     Shingles Vaccine Overdue 9/24/2009     Influenza Vaccine Overdue 8/1/2020     Mammogram Next Due 2/4/2021      Done 2/4/2020 [facility] MAMMOGRAPHY     Done 11/2/2016 [employer]: WORKFLOW - HEALTHY PLANET - EXTERNAL DATA -   EXTERNAL PROCEDURES DATE - MAMMOGRAM    Lipid Panel Next Due 4/16/2024      Done 4/16/2019 LIPID PANEL     Done 1/31/2017 LIPID PANEL     Done  9/18/2015 LIPID PANEL     Done 9/18/2014 LIPID PANEL    Colorectal Cancer Screening Next Due 7/25/2024     TETANUS VACCINE Next Due 9/18/2025      Done 9/18/2015 Imm Admin: Tdap    Hepatitis C Screening This plan is no longer active.      Done 9/18/2015 HEPATITIS C ANTIBODY        Counseling:  Nutrition: Encouraged to take 5-10 servings fruits and vegetables daily   Exercise:  Physical activity recommendations reviewed and given hand out  Avoid Tobacco Use: reviewed  Avoid ETOH/Drug Use:  reviewed  STD Prevention/Abstinence:   Avoid High Risk Behavior:   Unintended Pregnancy:    Lap-shoulder Belts:  Yes  No text/talk while driving: Reviewed  Bike/ATV Motorcycle Helmets:  N/A  Smoke Detector:  yes  Safe Storage/Removal of Firearms: reviewed    Calcium Intake (females):  Calcium recommendations given with handout  Regular Dental Visits:  yes  Floss, Brush and Fluoride: yes    She has completed a full 14 system review. All items are negative except as indicated.    Review of Systems   Constitutional: Positive for diaphoresis and fatigue.   HENT: Positive for drooling.    Eyes: Negative.    Respiratory: Positive for wheezing.    Cardiovascular: Negative.    Gastrointestinal: Positive for constipation and diarrhea.   Endocrine: Negative.    Genitourinary: Positive for difficulty urinating, frequency and vaginal discharge.   Musculoskeletal: Positive for back pain and neck stiffness.   Skin: Positive for rash (recurrent).   Allergic/Immunologic: Positive for environmental allergies and food allergies.   Neurological: Positive for dizziness (BPPV).   Hematological: Bruises/bleeds easily.   Psychiatric/Behavioral: Positive for decreased concentration.       Objective:      Physical Exam  Constitutional:       Appearance: She is well-developed.   HENT:      Head: Normocephalic and atraumatic.      Right Ear: External ear normal.      Left Ear: External ear normal.      Mouth/Throat:      Pharynx: No oropharyngeal exudate.    Neck:      Musculoskeletal: Normal range of motion and neck supple.   Cardiovascular:      Rate and Rhythm: Normal rate and regular rhythm.      Heart sounds: Normal heart sounds.   Pulmonary:      Effort: Pulmonary effort is normal.      Breath sounds: Normal breath sounds.   Abdominal:      General: There is no distension.      Palpations: Abdomen is soft.      Tenderness: There is no abdominal tenderness.   Skin:     General: Skin is warm and dry.   Neurological:      Mental Status: She is alert and oriented to person, place, and time.   Psychiatric:         Behavior: Behavior normal.           Assessment/Plan:     1. Wellness examination  HIV 1/2 Ag/Ab (4th Gen)    Comprehensive metabolic panel    CBC auto differential    Lipid Panel   2. Hypertension, essential  Comprehensive metabolic panel    CBC auto differential    Lipid Panel   3. Hemorrhoids, unspecified hemorrhoid type  hydrocortisone (ANUSOL-HC) 25 mg suppository   4. Screening for HIV without presence of risk factors  HIV 1/2 Ag/Ab (4th Gen)   5. Dermatitis  hydrocortisone (WESTCORT) 0.2 % cream    mupirocin (BACTROBAN) 2 % ointment   6. Benign paroxysmal positional vertigo, unspecified laterality     7. Paresthesia of right upper and lower extremity      single episode cold/breeze sensation to RUE, RLE - occurred just once - lasting just minutes 9/2020     Sees Dr Maria Guadalupe Lord - last mmg 2/4/2020. She will see her again next year.

## 2020-10-03 NOTE — PATIENT INSTRUCTIONS

## 2020-10-05 NOTE — PROGRESS NOTES
Patient contacted & scheduled for her labs on tomorrow 10/6/2020 @ [facility] for 12pm & verbally understood the information.

## 2020-10-06 ENCOUNTER — LAB VISIT (OUTPATIENT)
Dept: LAB | Facility: HOSPITAL | Age: 60
End: 2020-10-06
Attending: FAMILY MEDICINE
Payer: COMMERCIAL

## 2020-10-06 DIAGNOSIS — Z00.00 WELLNESS EXAMINATION: ICD-10-CM

## 2020-10-06 DIAGNOSIS — Z11.4 SCREENING FOR HIV WITHOUT PRESENCE OF RISK FACTORS: ICD-10-CM

## 2020-10-06 DIAGNOSIS — I10 HYPERTENSION, ESSENTIAL: ICD-10-CM

## 2020-10-06 LAB
ALBUMIN SERPL BCP-MCNC: 3.8 G/DL (ref 3.5–5.2)
ALP SERPL-CCNC: 52 U/L (ref 55–135)
ALT SERPL W/O P-5'-P-CCNC: 17 U/L (ref 10–44)
ANION GAP SERPL CALC-SCNC: 13 MMOL/L (ref 8–16)
AST SERPL-CCNC: 18 U/L (ref 10–40)
BASOPHILS # BLD AUTO: 0.05 K/UL (ref 0–0.2)
BASOPHILS NFR BLD: 0.7 % (ref 0–1.9)
BILIRUB SERPL-MCNC: 0.9 MG/DL (ref 0.1–1)
BUN SERPL-MCNC: 16 MG/DL (ref 8–23)
CALCIUM SERPL-MCNC: 9.3 MG/DL (ref 8.7–10.5)
CHLORIDE SERPL-SCNC: 97 MMOL/L (ref 95–110)
CHOLEST SERPL-MCNC: 202 MG/DL (ref 120–199)
CHOLEST/HDLC SERPL: 3.9 {RATIO} (ref 2–5)
CO2 SERPL-SCNC: 29 MMOL/L (ref 23–29)
CREAT SERPL-MCNC: 0.9 MG/DL (ref 0.5–1.4)
DIFFERENTIAL METHOD: NORMAL
EOSINOPHIL # BLD AUTO: 0.3 K/UL (ref 0–0.5)
EOSINOPHIL NFR BLD: 3.7 % (ref 0–8)
ERYTHROCYTE [DISTWIDTH] IN BLOOD BY AUTOMATED COUNT: 13 % (ref 11.5–14.5)
EST. GFR  (AFRICAN AMERICAN): >60 ML/MIN/1.73 M^2
EST. GFR  (NON AFRICAN AMERICAN): >60 ML/MIN/1.73 M^2
GLUCOSE SERPL-MCNC: 99 MG/DL (ref 70–110)
HCT VFR BLD AUTO: 43.4 % (ref 37–48.5)
HDLC SERPL-MCNC: 52 MG/DL (ref 40–75)
HDLC SERPL: 25.7 % (ref 20–50)
HGB BLD-MCNC: 14.3 G/DL (ref 12–16)
IMM GRANULOCYTES # BLD AUTO: 0.01 K/UL (ref 0–0.04)
IMM GRANULOCYTES NFR BLD AUTO: 0.1 % (ref 0–0.5)
LDLC SERPL CALC-MCNC: 104.6 MG/DL (ref 63–159)
LYMPHOCYTES # BLD AUTO: 2.6 K/UL (ref 1–4.8)
LYMPHOCYTES NFR BLD: 38.5 % (ref 18–48)
MCH RBC QN AUTO: 29.8 PG (ref 27–31)
MCHC RBC AUTO-ENTMCNC: 32.9 G/DL (ref 32–36)
MCV RBC AUTO: 90 FL (ref 82–98)
MONOCYTES # BLD AUTO: 0.7 K/UL (ref 0.3–1)
MONOCYTES NFR BLD: 9.7 % (ref 4–15)
NEUTROPHILS # BLD AUTO: 3.1 K/UL (ref 1.8–7.7)
NEUTROPHILS NFR BLD: 47.3 % (ref 38–73)
NONHDLC SERPL-MCNC: 150 MG/DL
NRBC BLD-RTO: 0 /100 WBC
PLATELET # BLD AUTO: 269 K/UL (ref 150–350)
PMV BLD AUTO: 10.5 FL (ref 9.2–12.9)
POTASSIUM SERPL-SCNC: 4.1 MMOL/L (ref 3.5–5.1)
PROT SERPL-MCNC: 7.3 G/DL (ref 6–8.4)
RBC # BLD AUTO: 4.8 M/UL (ref 4–5.4)
SODIUM SERPL-SCNC: 139 MMOL/L (ref 136–145)
TRIGL SERPL-MCNC: 227 MG/DL (ref 30–150)
WBC # BLD AUTO: 6.67 K/UL (ref 3.9–12.7)

## 2020-10-06 PROCEDURE — 80053 COMPREHEN METABOLIC PANEL: CPT

## 2020-10-06 PROCEDURE — 86703 HIV-1/HIV-2 1 RESULT ANTBDY: CPT

## 2020-10-06 PROCEDURE — 36415 COLL VENOUS BLD VENIPUNCTURE: CPT

## 2020-10-06 PROCEDURE — 85025 COMPLETE CBC W/AUTO DIFF WBC: CPT

## 2020-10-06 PROCEDURE — 80061 LIPID PANEL: CPT

## 2020-10-08 DIAGNOSIS — K64.8 INTERNAL PROLAPSED HEMORRHOIDS: ICD-10-CM

## 2020-10-08 LAB — HIV 1+2 AB+HIV1 P24 AG SERPL QL IA: NEGATIVE

## 2020-10-08 NOTE — TELEPHONE ENCOUNTER
Patient [facility] called in stating that the Hydrocortisone Suppository isn't covered under the patient insurance.Hanny wants to know if something else could be called in for the patient?

## 2020-10-08 NOTE — TELEPHONE ENCOUNTER
Patient requested the suppository.  If she wants to purchase it herself, great. Otherwise I will call in some Proctofoam.  It needs a PA however.     Verify which pharmacy she wants it sent to.

## 2020-10-08 NOTE — TELEPHONE ENCOUNTER
----- Message from Janelle De La Cruz sent at 10/8/2020 11:43 AM CDT -----  Contact: Hanny/[phone]  Type:  Pharmacy Calling to Clarify an RX    Name of Caller:Hanny  Pharmacy Name:[facility]  Prescription Name:Hydrocortisone Suppository  What do they need to clarify?Not covered by insurance  Best Call Back Number:[phone]  Additional Information: Can you write a prescription for something else      Thanks/PRABHA

## 2020-10-09 NOTE — TELEPHONE ENCOUNTER
Hydrocortisone floyd 0.2% cream has approved for coverage. Approval is effective 10/07/2020-10/07/2021.

## 2020-12-28 ENCOUNTER — TELEPHONE (OUTPATIENT)
Dept: INTERNAL MEDICINE | Facility: CLINIC | Age: 60
End: 2020-12-28

## 2020-12-28 NOTE — TELEPHONE ENCOUNTER
----- Message from Lisette Brown sent at 12/28/2020 11:26 AM CST -----  Contact: Jj/  Jj would like a call back at [phone], Regards to getting something called in for sinus.      [id number] [place] [facility] [address]  [address] [place] [address]  Phone: [phone] Fax: [phone]    Thanks  Td

## 2020-12-28 NOTE — TELEPHONE ENCOUNTER
Schedule patient please tomorrow 12/29/20 @ 8:30 virtual visit for sinusitis with Dr. Bernal.    Patient requesting pain medicine or abx to take tonight to ease her jaw pain from sinusitis as per patient states. Please advise.

## 2020-12-29 ENCOUNTER — PATIENT MESSAGE (OUTPATIENT)
Dept: INTERNAL MEDICINE | Facility: CLINIC | Age: 60
End: 2020-12-29

## 2020-12-29 ENCOUNTER — OFFICE VISIT (OUTPATIENT)
Dept: INTERNAL MEDICINE | Facility: CLINIC | Age: 60
End: 2020-12-29
Payer: COMMERCIAL

## 2020-12-29 DIAGNOSIS — R68.84 JAW PAIN: ICD-10-CM

## 2020-12-29 DIAGNOSIS — R09.81 NASAL SINUS CONGESTION: ICD-10-CM

## 2020-12-29 DIAGNOSIS — Z20.822 SUSPECTED COVID-19 VIRUS INFECTION: ICD-10-CM

## 2020-12-29 DIAGNOSIS — R06.02 SOB (SHORTNESS OF BREATH): Primary | ICD-10-CM

## 2020-12-29 PROCEDURE — 99213 OFFICE O/P EST LOW 20 MIN: CPT | Mod: 95,,, | Performed by: FAMILY MEDICINE

## 2020-12-29 PROCEDURE — 99213 PR OFFICE/OUTPT VISIT, EST, LEVL III, 20-29 MIN: ICD-10-PCS | Mod: 95,,, | Performed by: FAMILY MEDICINE

## 2020-12-29 RX ORDER — METHYLPREDNISOLONE 4 MG/1
TABLET ORAL
Qty: 1 PACKAGE | Refills: 0 | Status: SHIPPED | OUTPATIENT
Start: 2020-12-29 | End: 2021-06-28

## 2020-12-29 RX ORDER — FLUTICASONE PROPIONATE 50 MCG
2 SPRAY, SUSPENSION (ML) NASAL DAILY
Qty: 16 G | Refills: 12 | Status: SHIPPED | OUTPATIENT
Start: 2020-12-29 | End: 2022-10-19

## 2021-01-19 ENCOUNTER — LAB VISIT (OUTPATIENT)
Dept: INTERNAL MEDICINE | Facility: CLINIC | Age: 61
End: 2021-01-19
Payer: COMMERCIAL

## 2021-01-19 DIAGNOSIS — R09.81 NASAL SINUS CONGESTION: ICD-10-CM

## 2021-01-19 DIAGNOSIS — R06.02 SOB (SHORTNESS OF BREATH): ICD-10-CM

## 2021-01-19 PROCEDURE — U0003 INFECTIOUS AGENT DETECTION BY NUCLEIC ACID (DNA OR RNA); SEVERE ACUTE RESPIRATORY SYNDROME CORONAVIRUS 2 (SARS-COV-2) (CORONAVIRUS DISEASE [COVID-19]), AMPLIFIED PROBE TECHNIQUE, MAKING USE OF HIGH THROUGHPUT TECHNOLOGIES AS DESCRIBED BY CMS-2020-01-R: HCPCS

## 2021-01-20 LAB — SARS-COV-2 RNA RESP QL NAA+PROBE: DETECTED

## 2021-01-21 ENCOUNTER — TELEPHONE (OUTPATIENT)
Dept: INTERNAL MEDICINE | Facility: CLINIC | Age: 61
End: 2021-01-21

## 2021-01-21 DIAGNOSIS — U07.1 COVID-19 VIRUS INFECTION: Primary | ICD-10-CM

## 2021-01-27 ENCOUNTER — NURSE TRIAGE (OUTPATIENT)
Dept: ADMINISTRATIVE | Facility: CLINIC | Age: 61
End: 2021-01-27

## 2021-02-11 DIAGNOSIS — Z12.31 OTHER SCREENING MAMMOGRAM: ICD-10-CM

## 2021-04-28 ENCOUNTER — PATIENT MESSAGE (OUTPATIENT)
Dept: RESEARCH | Facility: HOSPITAL | Age: 61
End: 2021-04-28

## 2021-06-28 ENCOUNTER — OFFICE VISIT (OUTPATIENT)
Dept: INTERNAL MEDICINE | Facility: CLINIC | Age: 61
End: 2021-06-28
Payer: COMMERCIAL

## 2021-06-28 ENCOUNTER — HOSPITAL ENCOUNTER (OUTPATIENT)
Dept: RADIOLOGY | Facility: HOSPITAL | Age: 61
Discharge: HOME | End: 2021-06-28
Attending: PHYSICIAN ASSISTANT
Payer: COMMERCIAL

## 2021-06-28 VITALS
SYSTOLIC BLOOD PRESSURE: 118 MMHG | DIASTOLIC BLOOD PRESSURE: 68 MMHG | HEIGHT: 72 IN | WEIGHT: 177.69 LBS | TEMPERATURE: 99 F | OXYGEN SATURATION: 95 % | HEART RATE: 91 BPM | BODY MASS INDEX: 24.07 KG/M2

## 2021-06-28 DIAGNOSIS — R14.0 ABDOMINAL BLOATING: ICD-10-CM

## 2021-06-28 DIAGNOSIS — R10.13 EPIGASTRIC PAIN: ICD-10-CM

## 2021-06-28 DIAGNOSIS — I10 HYPERTENSION, ESSENTIAL: ICD-10-CM

## 2021-06-28 DIAGNOSIS — R10.13 EPIGASTRIC PAIN: Primary | ICD-10-CM

## 2021-06-28 DIAGNOSIS — Z12.31 ENCOUNTER FOR SCREENING MAMMOGRAM FOR MALIGNANT NEOPLASM OF BREAST: ICD-10-CM

## 2021-06-28 PROCEDURE — 74019 RADEX ABDOMEN 2 VIEWS: CPT | Mod: 26,,, | Performed by: RADIOLOGY

## 2021-06-28 PROCEDURE — 99214 OFFICE O/P EST MOD 30 MIN: CPT | Mod: S$GLB,,, | Performed by: PHYSICIAN ASSISTANT

## 2021-06-28 PROCEDURE — 99214 PR OFFICE/OUTPT VISIT, EST, LEVL IV, 30-39 MIN: ICD-10-PCS | Mod: S$GLB,,, | Performed by: PHYSICIAN ASSISTANT

## 2021-06-28 PROCEDURE — 74019 XR ABDOMEN FLAT AND ERECT: ICD-10-PCS | Mod: 26,,, | Performed by: RADIOLOGY

## 2021-06-28 PROCEDURE — 3008F BODY MASS INDEX DOCD: CPT | Mod: CPTII,S$GLB,, | Performed by: PHYSICIAN ASSISTANT

## 2021-06-28 PROCEDURE — 99999 PR PBB SHADOW E&M-EST. PATIENT-LVL IV: CPT | Mod: PBBFAC,,, | Performed by: PHYSICIAN ASSISTANT

## 2021-06-28 PROCEDURE — 74019 RADEX ABDOMEN 2 VIEWS: CPT | Mod: TC

## 2021-06-28 PROCEDURE — 3008F PR BODY MASS INDEX (BMI) DOCUMENTED: ICD-10-PCS | Mod: CPTII,S$GLB,, | Performed by: PHYSICIAN ASSISTANT

## 2021-06-28 PROCEDURE — 99999 PR PBB SHADOW E&M-EST. PATIENT-LVL IV: ICD-10-PCS | Mod: PBBFAC,,, | Performed by: PHYSICIAN ASSISTANT

## 2021-07-01 ENCOUNTER — PATIENT OUTREACH (OUTPATIENT)
Dept: ADMINISTRATIVE | Facility: OTHER | Age: 61
End: 2021-07-01

## 2021-07-01 ENCOUNTER — TELEPHONE (OUTPATIENT)
Dept: DERMATOLOGY | Facility: CLINIC | Age: 61
End: 2021-07-01

## 2021-07-01 DIAGNOSIS — Z12.31 ENCOUNTER FOR SCREENING MAMMOGRAM FOR BREAST CANCER: Primary | ICD-10-CM

## 2021-07-07 ENCOUNTER — PATIENT OUTREACH (OUTPATIENT)
Dept: ADMINISTRATIVE | Facility: HOSPITAL | Age: 61
End: 2021-07-07

## 2021-09-01 ENCOUNTER — PATIENT MESSAGE (OUTPATIENT)
Dept: INTERNAL MEDICINE | Facility: CLINIC | Age: 61
End: 2021-09-01

## 2021-09-01 ENCOUNTER — TELEPHONE (OUTPATIENT)
Dept: INTERNAL MEDICINE | Facility: CLINIC | Age: 61
End: 2021-09-01

## 2021-10-13 DIAGNOSIS — J30.89 NON-SEASONAL ALLERGIC RHINITIS, UNSPECIFIED TRIGGER: ICD-10-CM

## 2021-10-14 RX ORDER — LEVOCETIRIZINE DIHYDROCHLORIDE 5 MG/1
5 TABLET, FILM COATED ORAL DAILY
Qty: 90 TABLET | Refills: 0 | Status: SHIPPED | OUTPATIENT
Start: 2021-10-14 | End: 2021-10-26 | Stop reason: SDUPTHER

## 2021-10-26 ENCOUNTER — OFFICE VISIT (OUTPATIENT)
Dept: INTERNAL MEDICINE | Facility: CLINIC | Age: 61
End: 2021-10-26
Payer: COMMERCIAL

## 2021-10-26 VITALS
OXYGEN SATURATION: 98 % | HEIGHT: 72 IN | SYSTOLIC BLOOD PRESSURE: 120 MMHG | BODY MASS INDEX: 24.18 KG/M2 | HEART RATE: 73 BPM | TEMPERATURE: 98 F | DIASTOLIC BLOOD PRESSURE: 70 MMHG | WEIGHT: 178.56 LBS

## 2021-10-26 DIAGNOSIS — E78.5 HYPERLIPIDEMIA, UNSPECIFIED HYPERLIPIDEMIA TYPE: ICD-10-CM

## 2021-10-26 DIAGNOSIS — R60.0 EDEMA OF EXTREMITIES: ICD-10-CM

## 2021-10-26 DIAGNOSIS — J30.89 NON-SEASONAL ALLERGIC RHINITIS, UNSPECIFIED TRIGGER: ICD-10-CM

## 2021-10-26 DIAGNOSIS — E55.9 VITAMIN D DEFICIENCY DISEASE: ICD-10-CM

## 2021-10-26 DIAGNOSIS — M54.41 CHRONIC LOW BACK PAIN WITH RIGHT-SIDED SCIATICA, UNSPECIFIED BACK PAIN LATERALITY: ICD-10-CM

## 2021-10-26 DIAGNOSIS — L30.9 DERMATITIS: ICD-10-CM

## 2021-10-26 DIAGNOSIS — Z76.89 ENCOUNTER TO ESTABLISH CARE WITH NEW DOCTOR: Primary | ICD-10-CM

## 2021-10-26 DIAGNOSIS — G89.29 CHRONIC LOW BACK PAIN WITH RIGHT-SIDED SCIATICA, UNSPECIFIED BACK PAIN LATERALITY: ICD-10-CM

## 2021-10-26 DIAGNOSIS — Z28.39 IMMUNIZATION DEFICIENCY: ICD-10-CM

## 2021-10-26 PROCEDURE — 99999 PR PBB SHADOW E&M-EST. PATIENT-LVL IV: CPT | Mod: PBBFAC,,, | Performed by: FAMILY MEDICINE

## 2021-10-26 PROCEDURE — 99214 OFFICE O/P EST MOD 30 MIN: CPT | Mod: S$GLB,,, | Performed by: FAMILY MEDICINE

## 2021-10-26 PROCEDURE — 99999 PR PBB SHADOW E&M-EST. PATIENT-LVL IV: ICD-10-PCS | Mod: PBBFAC,,, | Performed by: FAMILY MEDICINE

## 2021-10-26 PROCEDURE — 99214 PR OFFICE/OUTPT VISIT, EST, LEVL IV, 30-39 MIN: ICD-10-PCS | Mod: S$GLB,,, | Performed by: FAMILY MEDICINE

## 2021-10-26 RX ORDER — LEVOCETIRIZINE DIHYDROCHLORIDE 5 MG/1
5 TABLET, FILM COATED ORAL DAILY
Qty: 90 TABLET | Refills: 3 | Status: SHIPPED | OUTPATIENT
Start: 2021-10-26 | End: 2022-10-19 | Stop reason: SDUPTHER

## 2021-10-26 RX ORDER — MUPIROCIN 20 MG/G
OINTMENT TOPICAL
Qty: 22 G | Refills: 0 | Status: SHIPPED | OUTPATIENT
Start: 2021-10-26 | End: 2022-10-19 | Stop reason: SDUPTHER

## 2021-10-26 RX ORDER — PANTOPRAZOLE SODIUM 40 MG/1
1 TABLET, DELAYED RELEASE ORAL EVERY MORNING
COMMUNITY
Start: 2021-06-21 | End: 2022-10-19

## 2021-10-26 RX ORDER — TRIAMTERENE/HYDROCHLOROTHIAZID 37.5-25 MG
1 TABLET ORAL DAILY
Qty: 90 TABLET | Refills: 3 | Status: SHIPPED | OUTPATIENT
Start: 2021-10-26 | End: 2022-10-19 | Stop reason: SDUPTHER

## 2021-11-18 ENCOUNTER — PATIENT OUTREACH (OUTPATIENT)
Dept: ADMINISTRATIVE | Facility: OTHER | Age: 61
End: 2021-11-18
Payer: COMMERCIAL

## 2021-11-18 ENCOUNTER — TELEPHONE (OUTPATIENT)
Dept: PAIN MEDICINE | Facility: CLINIC | Age: 61
End: 2021-11-18
Payer: COMMERCIAL

## 2021-11-19 ENCOUNTER — OFFICE VISIT (OUTPATIENT)
Dept: PAIN MEDICINE | Facility: CLINIC | Age: 61
End: 2021-11-19
Payer: COMMERCIAL

## 2021-11-19 ENCOUNTER — HOSPITAL ENCOUNTER (OUTPATIENT)
Dept: RADIOLOGY | Facility: HOSPITAL | Age: 61
Discharge: HOME | End: 2021-11-19
Attending: ANESTHESIOLOGY
Payer: COMMERCIAL

## 2021-11-19 VITALS
WEIGHT: 175.69 LBS | BODY MASS INDEX: 23.8 KG/M2 | HEIGHT: 72 IN | HEART RATE: 97 BPM | DIASTOLIC BLOOD PRESSURE: 83 MMHG | RESPIRATION RATE: 18 BRPM | SYSTOLIC BLOOD PRESSURE: 147 MMHG

## 2021-11-19 DIAGNOSIS — M53.3 SACROILIAC JOINT PAIN: ICD-10-CM

## 2021-11-19 DIAGNOSIS — M25.559 HIP PAIN: ICD-10-CM

## 2021-11-19 DIAGNOSIS — M54.41 CHRONIC LOW BACK PAIN WITH RIGHT-SIDED SCIATICA, UNSPECIFIED BACK PAIN LATERALITY: ICD-10-CM

## 2021-11-19 DIAGNOSIS — G89.29 CHRONIC LOW BACK PAIN WITH RIGHT-SIDED SCIATICA, UNSPECIFIED BACK PAIN LATERALITY: ICD-10-CM

## 2021-11-19 DIAGNOSIS — M47.816 LUMBAR FACET ARTHROPATHY: ICD-10-CM

## 2021-11-19 DIAGNOSIS — M47.816 LUMBAR SPONDYLOSIS: Primary | ICD-10-CM

## 2021-11-19 PROCEDURE — 99999 PR PBB SHADOW E&M-EST. PATIENT-LVL V: CPT | Mod: PBBFAC,,, | Performed by: ANESTHESIOLOGY

## 2021-11-19 PROCEDURE — 72110 X-RAY EXAM L-2 SPINE 4/>VWS: CPT | Mod: TC

## 2021-11-19 PROCEDURE — 72110 X-RAY EXAM L-2 SPINE 4/>VWS: CPT | Mod: 26,,, | Performed by: RADIOLOGY

## 2021-11-19 PROCEDURE — 72110 XR LUMBAR SPINE 5 VIEW WITH FLEX AND EXT: ICD-10-PCS | Mod: 26,,, | Performed by: RADIOLOGY

## 2021-11-19 PROCEDURE — 73502 X-RAY EXAM HIP UNI 2-3 VIEWS: CPT | Mod: 26,LT,, | Performed by: RADIOLOGY

## 2021-11-19 PROCEDURE — 99999 PR PBB SHADOW E&M-EST. PATIENT-LVL V: ICD-10-PCS | Mod: PBBFAC,,, | Performed by: ANESTHESIOLOGY

## 2021-11-19 PROCEDURE — 99244 PR OFFICE CONSULTATION,LEVEL IV: ICD-10-PCS | Mod: S$GLB,,, | Performed by: ANESTHESIOLOGY

## 2021-11-19 PROCEDURE — 73502 XR HIP WITH PELVIS WHEN PERFORMED, 2 OR 3 VIEWS LEFT: ICD-10-PCS | Mod: 26,LT,, | Performed by: RADIOLOGY

## 2021-11-19 PROCEDURE — 99244 OFF/OP CNSLTJ NEW/EST MOD 40: CPT | Mod: S$GLB,,, | Performed by: ANESTHESIOLOGY

## 2021-11-19 PROCEDURE — 73502 X-RAY EXAM HIP UNI 2-3 VIEWS: CPT | Mod: TC,LT

## 2021-11-19 RX ORDER — TIZANIDINE 4 MG/1
4 TABLET ORAL 2 TIMES DAILY PRN
Qty: 60 TABLET | Refills: 0 | Status: SHIPPED | OUTPATIENT
Start: 2021-11-19 | End: 2021-12-15

## 2021-11-22 RX ORDER — ATORVASTATIN CALCIUM 10 MG/1
10 TABLET, FILM COATED ORAL DAILY
Qty: 90 TABLET | Refills: 0 | Status: SHIPPED | OUTPATIENT
Start: 2021-11-22 | End: 2022-10-19

## 2021-11-26 ENCOUNTER — CLINICAL SUPPORT (OUTPATIENT)
Dept: REHABILITATION | Facility: HOSPITAL | Age: 61
End: 2021-11-26
Attending: ANESTHESIOLOGY
Payer: COMMERCIAL

## 2021-11-26 DIAGNOSIS — R68.89 DECREASED STRENGTH, ENDURANCE, AND MOBILITY: ICD-10-CM

## 2021-11-26 DIAGNOSIS — M25.559 HIP PAIN: ICD-10-CM

## 2021-11-26 DIAGNOSIS — G89.29 CHRONIC BILATERAL LOW BACK PAIN WITH BILATERAL SCIATICA: Primary | ICD-10-CM

## 2021-11-26 DIAGNOSIS — M54.41 CHRONIC BILATERAL LOW BACK PAIN WITH BILATERAL SCIATICA: Primary | ICD-10-CM

## 2021-11-26 DIAGNOSIS — M47.816 LUMBAR SPONDYLOSIS: ICD-10-CM

## 2021-11-26 DIAGNOSIS — M54.42 CHRONIC BILATERAL LOW BACK PAIN WITH BILATERAL SCIATICA: Primary | ICD-10-CM

## 2021-11-26 DIAGNOSIS — Z74.09 DECREASED STRENGTH, ENDURANCE, AND MOBILITY: ICD-10-CM

## 2021-11-26 DIAGNOSIS — M47.816 LUMBAR FACET ARTHROPATHY: ICD-10-CM

## 2021-11-26 DIAGNOSIS — R53.1 DECREASED STRENGTH, ENDURANCE, AND MOBILITY: ICD-10-CM

## 2021-11-26 PROCEDURE — 97162 PT EVAL MOD COMPLEX 30 MIN: CPT | Performed by: PHYSICAL THERAPIST

## 2021-11-30 ENCOUNTER — CLINICAL SUPPORT (OUTPATIENT)
Dept: REHABILITATION | Facility: HOSPITAL | Age: 61
End: 2021-11-30
Payer: COMMERCIAL

## 2021-11-30 ENCOUNTER — DOCUMENTATION ONLY (OUTPATIENT)
Dept: REHABILITATION | Facility: HOSPITAL | Age: 61
End: 2021-11-30

## 2021-11-30 DIAGNOSIS — M54.42 CHRONIC BILATERAL LOW BACK PAIN WITH BILATERAL SCIATICA: ICD-10-CM

## 2021-11-30 DIAGNOSIS — M54.41 CHRONIC BILATERAL LOW BACK PAIN WITH BILATERAL SCIATICA: ICD-10-CM

## 2021-11-30 DIAGNOSIS — M25.559 HIP PAIN: ICD-10-CM

## 2021-11-30 DIAGNOSIS — G89.29 CHRONIC BILATERAL LOW BACK PAIN WITH BILATERAL SCIATICA: ICD-10-CM

## 2021-11-30 DIAGNOSIS — R68.89 DECREASED STRENGTH, ENDURANCE, AND MOBILITY: ICD-10-CM

## 2021-11-30 DIAGNOSIS — M47.816 LUMBAR SPONDYLOSIS: ICD-10-CM

## 2021-11-30 DIAGNOSIS — Z74.09 DECREASED STRENGTH, ENDURANCE, AND MOBILITY: ICD-10-CM

## 2021-11-30 DIAGNOSIS — R53.1 DECREASED STRENGTH, ENDURANCE, AND MOBILITY: ICD-10-CM

## 2021-11-30 DIAGNOSIS — M47.816 LUMBAR FACET ARTHROPATHY: ICD-10-CM

## 2021-11-30 PROCEDURE — 97110 THERAPEUTIC EXERCISES: CPT | Mod: CQ

## 2021-12-03 ENCOUNTER — CLINICAL SUPPORT (OUTPATIENT)
Dept: REHABILITATION | Facility: HOSPITAL | Age: 61
End: 2021-12-03
Payer: COMMERCIAL

## 2021-12-03 DIAGNOSIS — M54.42 CHRONIC BILATERAL LOW BACK PAIN WITH BILATERAL SCIATICA: ICD-10-CM

## 2021-12-03 DIAGNOSIS — M47.816 LUMBAR SPONDYLOSIS: ICD-10-CM

## 2021-12-03 DIAGNOSIS — R53.1 DECREASED STRENGTH, ENDURANCE, AND MOBILITY: ICD-10-CM

## 2021-12-03 DIAGNOSIS — R68.89 DECREASED STRENGTH, ENDURANCE, AND MOBILITY: ICD-10-CM

## 2021-12-03 DIAGNOSIS — M25.559 HIP PAIN: ICD-10-CM

## 2021-12-03 DIAGNOSIS — Z74.09 DECREASED STRENGTH, ENDURANCE, AND MOBILITY: ICD-10-CM

## 2021-12-03 DIAGNOSIS — G89.29 CHRONIC BILATERAL LOW BACK PAIN WITH BILATERAL SCIATICA: ICD-10-CM

## 2021-12-03 DIAGNOSIS — M54.41 CHRONIC BILATERAL LOW BACK PAIN WITH BILATERAL SCIATICA: ICD-10-CM

## 2021-12-03 DIAGNOSIS — M47.816 LUMBAR FACET ARTHROPATHY: ICD-10-CM

## 2021-12-03 PROCEDURE — 97110 THERAPEUTIC EXERCISES: CPT | Mod: CQ

## 2021-12-06 ENCOUNTER — CLINICAL SUPPORT (OUTPATIENT)
Dept: REHABILITATION | Facility: HOSPITAL | Age: 61
End: 2021-12-06
Payer: COMMERCIAL

## 2021-12-06 DIAGNOSIS — R53.1 DECREASED STRENGTH, ENDURANCE, AND MOBILITY: ICD-10-CM

## 2021-12-06 DIAGNOSIS — M54.42 CHRONIC BILATERAL LOW BACK PAIN WITH BILATERAL SCIATICA: ICD-10-CM

## 2021-12-06 DIAGNOSIS — M47.816 LUMBAR FACET ARTHROPATHY: ICD-10-CM

## 2021-12-06 DIAGNOSIS — M54.41 CHRONIC BILATERAL LOW BACK PAIN WITH BILATERAL SCIATICA: ICD-10-CM

## 2021-12-06 DIAGNOSIS — G89.29 CHRONIC BILATERAL LOW BACK PAIN WITH BILATERAL SCIATICA: ICD-10-CM

## 2021-12-06 DIAGNOSIS — R68.89 DECREASED STRENGTH, ENDURANCE, AND MOBILITY: ICD-10-CM

## 2021-12-06 DIAGNOSIS — Z74.09 DECREASED STRENGTH, ENDURANCE, AND MOBILITY: ICD-10-CM

## 2021-12-06 DIAGNOSIS — M47.816 LUMBAR SPONDYLOSIS: ICD-10-CM

## 2021-12-06 DIAGNOSIS — M25.559 HIP PAIN: ICD-10-CM

## 2021-12-06 PROCEDURE — 97110 THERAPEUTIC EXERCISES: CPT | Mod: CQ

## 2021-12-09 ENCOUNTER — CLINICAL SUPPORT (OUTPATIENT)
Dept: REHABILITATION | Facility: HOSPITAL | Age: 61
End: 2021-12-09
Payer: COMMERCIAL

## 2021-12-09 DIAGNOSIS — M25.559 HIP PAIN: ICD-10-CM

## 2021-12-09 DIAGNOSIS — R53.1 DECREASED STRENGTH, ENDURANCE, AND MOBILITY: ICD-10-CM

## 2021-12-09 DIAGNOSIS — G89.29 CHRONIC BILATERAL LOW BACK PAIN WITH BILATERAL SCIATICA: ICD-10-CM

## 2021-12-09 DIAGNOSIS — R68.89 DECREASED STRENGTH, ENDURANCE, AND MOBILITY: ICD-10-CM

## 2021-12-09 DIAGNOSIS — Z74.09 DECREASED STRENGTH, ENDURANCE, AND MOBILITY: ICD-10-CM

## 2021-12-09 DIAGNOSIS — M54.42 CHRONIC BILATERAL LOW BACK PAIN WITH BILATERAL SCIATICA: ICD-10-CM

## 2021-12-09 DIAGNOSIS — M47.816 LUMBAR SPONDYLOSIS: ICD-10-CM

## 2021-12-09 DIAGNOSIS — M47.816 LUMBAR FACET ARTHROPATHY: ICD-10-CM

## 2021-12-09 DIAGNOSIS — M54.41 CHRONIC BILATERAL LOW BACK PAIN WITH BILATERAL SCIATICA: ICD-10-CM

## 2021-12-09 PROCEDURE — 97110 THERAPEUTIC EXERCISES: CPT | Performed by: PHYSICAL THERAPIST

## 2021-12-09 PROCEDURE — 97112 NEUROMUSCULAR REEDUCATION: CPT | Performed by: PHYSICAL THERAPIST

## 2021-12-13 ENCOUNTER — CLINICAL SUPPORT (OUTPATIENT)
Dept: REHABILITATION | Facility: HOSPITAL | Age: 61
End: 2021-12-13
Payer: COMMERCIAL

## 2021-12-13 DIAGNOSIS — R68.89 DECREASED STRENGTH, ENDURANCE, AND MOBILITY: ICD-10-CM

## 2021-12-13 DIAGNOSIS — G89.29 CHRONIC BILATERAL LOW BACK PAIN WITH BILATERAL SCIATICA: ICD-10-CM

## 2021-12-13 DIAGNOSIS — Z74.09 DECREASED STRENGTH, ENDURANCE, AND MOBILITY: ICD-10-CM

## 2021-12-13 DIAGNOSIS — M54.42 CHRONIC BILATERAL LOW BACK PAIN WITH BILATERAL SCIATICA: ICD-10-CM

## 2021-12-13 DIAGNOSIS — M47.816 LUMBAR SPONDYLOSIS: ICD-10-CM

## 2021-12-13 DIAGNOSIS — M25.559 HIP PAIN: ICD-10-CM

## 2021-12-13 DIAGNOSIS — R53.1 DECREASED STRENGTH, ENDURANCE, AND MOBILITY: ICD-10-CM

## 2021-12-13 DIAGNOSIS — M54.41 CHRONIC BILATERAL LOW BACK PAIN WITH BILATERAL SCIATICA: ICD-10-CM

## 2021-12-13 DIAGNOSIS — M47.816 LUMBAR FACET ARTHROPATHY: ICD-10-CM

## 2021-12-13 PROCEDURE — 97140 MANUAL THERAPY 1/> REGIONS: CPT | Performed by: PHYSICAL THERAPIST

## 2021-12-13 PROCEDURE — 97110 THERAPEUTIC EXERCISES: CPT | Performed by: PHYSICAL THERAPIST

## 2021-12-17 ENCOUNTER — CLINICAL SUPPORT (OUTPATIENT)
Dept: REHABILITATION | Facility: HOSPITAL | Age: 61
End: 2021-12-17
Payer: COMMERCIAL

## 2021-12-17 DIAGNOSIS — M47.816 LUMBAR FACET ARTHROPATHY: ICD-10-CM

## 2021-12-17 DIAGNOSIS — M25.559 HIP PAIN: ICD-10-CM

## 2021-12-17 DIAGNOSIS — Z74.09 DECREASED STRENGTH, ENDURANCE, AND MOBILITY: ICD-10-CM

## 2021-12-17 DIAGNOSIS — G89.29 CHRONIC BILATERAL LOW BACK PAIN WITH BILATERAL SCIATICA: ICD-10-CM

## 2021-12-17 DIAGNOSIS — M54.41 CHRONIC BILATERAL LOW BACK PAIN WITH BILATERAL SCIATICA: ICD-10-CM

## 2021-12-17 DIAGNOSIS — R53.1 DECREASED STRENGTH, ENDURANCE, AND MOBILITY: ICD-10-CM

## 2021-12-17 DIAGNOSIS — M47.816 LUMBAR SPONDYLOSIS: ICD-10-CM

## 2021-12-17 DIAGNOSIS — M54.42 CHRONIC BILATERAL LOW BACK PAIN WITH BILATERAL SCIATICA: ICD-10-CM

## 2021-12-17 DIAGNOSIS — R68.89 DECREASED STRENGTH, ENDURANCE, AND MOBILITY: ICD-10-CM

## 2021-12-17 PROCEDURE — 97110 THERAPEUTIC EXERCISES: CPT | Mod: CQ

## 2021-12-21 ENCOUNTER — CLINICAL SUPPORT (OUTPATIENT)
Dept: REHABILITATION | Facility: HOSPITAL | Age: 61
End: 2021-12-21
Payer: COMMERCIAL

## 2021-12-21 DIAGNOSIS — M54.41 CHRONIC BILATERAL LOW BACK PAIN WITH BILATERAL SCIATICA: ICD-10-CM

## 2021-12-21 DIAGNOSIS — M54.42 CHRONIC BILATERAL LOW BACK PAIN WITH BILATERAL SCIATICA: ICD-10-CM

## 2021-12-21 DIAGNOSIS — G89.29 CHRONIC BILATERAL LOW BACK PAIN WITH BILATERAL SCIATICA: ICD-10-CM

## 2021-12-21 DIAGNOSIS — M47.816 LUMBAR SPONDYLOSIS: ICD-10-CM

## 2021-12-21 DIAGNOSIS — M47.816 LUMBAR FACET ARTHROPATHY: ICD-10-CM

## 2021-12-21 DIAGNOSIS — R53.1 DECREASED STRENGTH, ENDURANCE, AND MOBILITY: ICD-10-CM

## 2021-12-21 DIAGNOSIS — M25.559 HIP PAIN: ICD-10-CM

## 2021-12-21 DIAGNOSIS — Z74.09 DECREASED STRENGTH, ENDURANCE, AND MOBILITY: ICD-10-CM

## 2021-12-21 DIAGNOSIS — R68.89 DECREASED STRENGTH, ENDURANCE, AND MOBILITY: ICD-10-CM

## 2021-12-21 PROCEDURE — 97112 NEUROMUSCULAR REEDUCATION: CPT | Performed by: PHYSICAL THERAPIST

## 2021-12-21 PROCEDURE — 97110 THERAPEUTIC EXERCISES: CPT | Performed by: PHYSICAL THERAPIST

## 2021-12-29 ENCOUNTER — CLINICAL SUPPORT (OUTPATIENT)
Dept: REHABILITATION | Facility: HOSPITAL | Age: 61
End: 2021-12-29
Payer: COMMERCIAL

## 2021-12-29 DIAGNOSIS — G89.29 CHRONIC BILATERAL LOW BACK PAIN WITH BILATERAL SCIATICA: ICD-10-CM

## 2021-12-29 DIAGNOSIS — M54.42 CHRONIC BILATERAL LOW BACK PAIN WITH BILATERAL SCIATICA: ICD-10-CM

## 2021-12-29 DIAGNOSIS — M25.559 HIP PAIN: ICD-10-CM

## 2021-12-29 DIAGNOSIS — Z74.09 DECREASED STRENGTH, ENDURANCE, AND MOBILITY: ICD-10-CM

## 2021-12-29 DIAGNOSIS — M47.816 LUMBAR FACET ARTHROPATHY: ICD-10-CM

## 2021-12-29 DIAGNOSIS — R68.89 DECREASED STRENGTH, ENDURANCE, AND MOBILITY: ICD-10-CM

## 2021-12-29 DIAGNOSIS — R53.1 DECREASED STRENGTH, ENDURANCE, AND MOBILITY: ICD-10-CM

## 2021-12-29 DIAGNOSIS — M54.41 CHRONIC BILATERAL LOW BACK PAIN WITH BILATERAL SCIATICA: ICD-10-CM

## 2021-12-29 DIAGNOSIS — M47.816 LUMBAR SPONDYLOSIS: ICD-10-CM

## 2021-12-29 PROCEDURE — 97110 THERAPEUTIC EXERCISES: CPT | Performed by: PHYSICAL THERAPIST

## 2021-12-29 PROCEDURE — 97140 MANUAL THERAPY 1/> REGIONS: CPT | Performed by: PHYSICAL THERAPIST

## 2021-12-30 ENCOUNTER — CLINICAL SUPPORT (OUTPATIENT)
Dept: REHABILITATION | Facility: HOSPITAL | Age: 61
End: 2021-12-30
Payer: COMMERCIAL

## 2021-12-30 DIAGNOSIS — R68.89 DECREASED STRENGTH, ENDURANCE, AND MOBILITY: ICD-10-CM

## 2021-12-30 DIAGNOSIS — M47.816 LUMBAR SPONDYLOSIS: ICD-10-CM

## 2021-12-30 DIAGNOSIS — M25.559 HIP PAIN: ICD-10-CM

## 2021-12-30 DIAGNOSIS — G89.29 CHRONIC BILATERAL LOW BACK PAIN WITH BILATERAL SCIATICA: ICD-10-CM

## 2021-12-30 DIAGNOSIS — M54.42 CHRONIC BILATERAL LOW BACK PAIN WITH BILATERAL SCIATICA: ICD-10-CM

## 2021-12-30 DIAGNOSIS — R53.1 DECREASED STRENGTH, ENDURANCE, AND MOBILITY: ICD-10-CM

## 2021-12-30 DIAGNOSIS — Z74.09 DECREASED STRENGTH, ENDURANCE, AND MOBILITY: ICD-10-CM

## 2021-12-30 DIAGNOSIS — M54.41 CHRONIC BILATERAL LOW BACK PAIN WITH BILATERAL SCIATICA: ICD-10-CM

## 2021-12-30 DIAGNOSIS — M47.816 LUMBAR FACET ARTHROPATHY: ICD-10-CM

## 2021-12-30 PROCEDURE — 97112 NEUROMUSCULAR REEDUCATION: CPT | Mod: CQ

## 2021-12-30 PROCEDURE — 97110 THERAPEUTIC EXERCISES: CPT | Mod: CQ

## 2022-01-04 ENCOUNTER — CLINICAL SUPPORT (OUTPATIENT)
Dept: REHABILITATION | Facility: HOSPITAL | Age: 62
End: 2022-01-04
Payer: COMMERCIAL

## 2022-01-04 DIAGNOSIS — R53.1 DECREASED STRENGTH, ENDURANCE, AND MOBILITY: ICD-10-CM

## 2022-01-04 DIAGNOSIS — M54.42 CHRONIC BILATERAL LOW BACK PAIN WITH BILATERAL SCIATICA: ICD-10-CM

## 2022-01-04 DIAGNOSIS — M25.559 HIP PAIN: ICD-10-CM

## 2022-01-04 DIAGNOSIS — M47.816 LUMBAR SPONDYLOSIS: ICD-10-CM

## 2022-01-04 DIAGNOSIS — M47.816 LUMBAR FACET ARTHROPATHY: ICD-10-CM

## 2022-01-04 DIAGNOSIS — R68.89 DECREASED STRENGTH, ENDURANCE, AND MOBILITY: ICD-10-CM

## 2022-01-04 DIAGNOSIS — Z74.09 DECREASED STRENGTH, ENDURANCE, AND MOBILITY: ICD-10-CM

## 2022-01-04 DIAGNOSIS — M54.41 CHRONIC BILATERAL LOW BACK PAIN WITH BILATERAL SCIATICA: ICD-10-CM

## 2022-01-04 DIAGNOSIS — G89.29 CHRONIC BILATERAL LOW BACK PAIN WITH BILATERAL SCIATICA: ICD-10-CM

## 2022-01-04 PROCEDURE — 97112 NEUROMUSCULAR REEDUCATION: CPT | Mod: CQ

## 2022-01-04 PROCEDURE — 97110 THERAPEUTIC EXERCISES: CPT | Mod: CQ

## 2022-01-04 PROCEDURE — 97140 MANUAL THERAPY 1/> REGIONS: CPT | Mod: CQ

## 2022-01-04 NOTE — PROGRESS NOTES
[facility]   Physical Therapy Daily Treatment Note     Name: Shahzad Redd [facility]  Clinic Number: [id number]    Therapy Diagnosis:   Encounter Diagnoses   Name Primary?    Hip pain     Lumbar spondylosis     Lumbar facet arthropathy     Decreased strength, endurance, and mobility     Chronic bilateral low back pain with bilateral sciatica      Physician: Colby Liu MD    Visit Date: 1/4/2022      Physician Orders: PT Eval and Treat  Medical Diagnosis from Referral: lumbar spondylosis, lumbar facet arthropathy  Evaluation Date: 11/26/2021  Authorization Period Expiration: 11/19/2021  Plan of Care Expiration: 2/24/2022  Progress Note Due: 12/24/2021  Visit # / Visits authorized: 10/20 (+1 evaluation)  FOTO: 2/ 3      Precautions: Standard    PTA Visit #: 2/5     Time In: 1635  Time Out: 1730  Total Billable Time: 41 minutes    SUBJECTIVE     Patient reports: feeling good today.    She was compliant with home exercise program due to being too busy.    Response to previous treatment: Felt a little after last session    Functional change: increased standing tolerance, able to stand and kneel in [employer] without issue, able to get in and out of bed with less difficulty. Minimal loss of balance with house hold distances.     Pain: 0/10     Location: right foot (no pain today)    OBJECTIVE     Objective Measures updated at progress report unless specified.       TREATMENT       MANUAL THERAPY TECHNIQUES were applied for 10 minutes, including:    Manual Intervention Performed Today    Soft Tissue Mobilization x  STM Right lateral hip and piriformis muscle   Joint Mobilizations  Lumbar manual traction    Mobilization with movement     Long leg distraction  Bilateral x 2 minutes   Functional Dry Needling        Plan for Next Visit:          THERAPEUTIC EXERCISES to develop strength, endurance, ROM, flexibility, posture and core stabilization for 23 minutes including:    Intervention Performed  "Today    Exercise bike x 5 minutes   Transverse abdominis contraction  3 minute     Glut sets  3 minutes    Posterior pelvic tilt  3 minutes   Supine marching with abdominal brace x 10 x 3    Supine abdominal brace with bilateral hip flexion   10 x    Supine hip abduction with belt  3 minutes   Sitting hip adduction with ball   3 minutes   Bridges with abdominal brace x 3 x 10 red band at knees arms folded   Piriformis stretch supine   3 x 30 seconds   Hamstring stretch  3 x 30 seconds   clams  2 x 10 B LE   SAQ     Straight leg raise with abdominal brace x 3 x 8 (added straight arm pull down red band)   Single knee to chest  5x, 10" holds each side   Sitting lumbar flexion x 10 x 10 seconds     Plan for Next Visit:        NEUROMUSCULAR RE-EDUCATION ACTIVITIES to improve Balance, Coordination, Kinesthetic, Sense, Proprioception and Posture for 8 minutes.  The following were included:     Intervention Performed Today    Standing March  x 2 1 minute  no hand support   Side stepping   Down and back 1 minute x 2 no hand support   Sit to stands  On hi-low mat, (higher up) 3 x 5   Step ups  6 inch step 2 x 10 no hands   Forward step downs off of step  6" step, 2 x 10   payloff press x 2 minutes each direction   Anti rotation walk outs     1/2 kneeling  x X 10 shuoulder flexion, x 5 diagonal upper extremity directions     Plan for Next Visit:         PATIENT EDUCATION AND HOME EXERCISES     Home Exercises Provided and Patient Education Provided     Education provided: (during session) minutes   Patient educated on biomechanical justification for therapeutic exercise and importance of compliance with HEP in order to improve overall impairments and QOL    Patient was educated on all the above exercise prior/during/after for proper posture, positioning, and execution for safe performance with home exercise program.     Written Home Exercises Provided: yes.  Exercises were reviewed and Shahzad was able to demonstrate them prior " to the end of the session.  Shahzad demonstrated fair  understanding of the education provided. See EMR under Patient Instructions for exercises provided during therapy sessions.      ASSESSMENT     Patient tolerated treatment with complaints of tenderness at the right lateral hip with manual therpay. She was progressed to 1/2 kneeling exercises with good stability and good maintenance of posterior pelvic tilt.     Shahzad is progressing well towards her goals.   Pt prognosis is Good.     Pt will continue to benefit from skilled outpatient physical therapy to address the deficits listed in the problem list box on initial evaluation, provide pt/family education and to maximize pt's level of independence in the home and community environment.     Pt's spiritual, cultural and educational needs considered and pt agreeable to plan of care and goals.       Anticipated Barriers for therapy: co-morbidities, sedentary lifestyle, chronicity of condition and adherence to treatment plan      GOALS:     Short Term Goals:  6 weeks Progress    1. Pain: Pt will demonstrate improved pain by reports of less than or equal to 6/10 worst pain on the verbal rating scale in order to progress toward maximal functional ability and improve QOL. Met   2. Function: Patient will demonstrate improved function as indicated by a functional limitation score of less than or equal to 49 out of 100 on FOTO. Met   3. Mobility: Patient will improve AROM to 50% of stated goals, listed in objective measures above, in order to progress towards independence with functional activities.  Met   4. Strength: Patient will improve strength to 50% of stated goals, listed in objective measures above, in order to progress towards independence with functional activities.  PM   5. Gait: Patient will demonstrate improved gait mechanics in order to improve functional mobility, improve quality of life, and decrease risk of further injury or fall.  Met   6. HEP: Patient will  demonstrate independence with HEP in order to progress toward functional independence. Met   7. Improve postural awareness.  Met      Long Term Goals:  12 weeks Progress   1. Pain: Pt will demonstrate improved pain by reports of less than or equal to 4/10 worst pain on the verbal rating scale in order to progress toward maximal functional ability and improve QOL.   Met   2. Function: Patient will demonstrate improved function as indicated by a functional limitation score of less than or equal to 42 out of 100 on FOTO. PC   3. Mobility: Patient will improve AROM to stated goals, listed in objective measures above, in order to return to maximal functional potential and improve quality of life. PM   4. Strength: Patient will improve strength to stated goals, listed in objective measures above, in order to improve functional independence and quality of life. PM   5. Gait: Patient will demonstrate normalized gait mechanics with minimal compensation in order to return to PLOF. PM   6. Patient will return to normal ADL's, IADL's, community involvement, recreational activities, and work-related activities with less than or equal to 2/10 pain and maximal function.  PC        Goals Key:  PC= progressing/continue; PM= partially met;        DC= discontinue    PLAN     Continue Plan of Care to strengthen core and hips allowing her to be able to return to all daily activities with no pain.     12/29/2021: It is my recommendation that patient continue with PT at her current frequency of 2 times per week for the remainder of her approved visits. Her treatment plan will remain the same, and she will be progressed appropriately.     Gaetano Baptiste, PTA

## 2022-01-07 ENCOUNTER — CLINICAL SUPPORT (OUTPATIENT)
Dept: REHABILITATION | Facility: HOSPITAL | Age: 62
End: 2022-01-07
Payer: COMMERCIAL

## 2022-01-07 ENCOUNTER — DOCUMENTATION ONLY (OUTPATIENT)
Dept: REHABILITATION | Facility: HOSPITAL | Age: 62
End: 2022-01-07
Payer: COMMERCIAL

## 2022-01-07 DIAGNOSIS — M47.816 LUMBAR FACET ARTHROPATHY: ICD-10-CM

## 2022-01-07 DIAGNOSIS — R53.1 DECREASED STRENGTH, ENDURANCE, AND MOBILITY: ICD-10-CM

## 2022-01-07 DIAGNOSIS — Z74.09 DECREASED STRENGTH, ENDURANCE, AND MOBILITY: ICD-10-CM

## 2022-01-07 DIAGNOSIS — M54.42 CHRONIC BILATERAL LOW BACK PAIN WITH BILATERAL SCIATICA: ICD-10-CM

## 2022-01-07 DIAGNOSIS — R68.89 DECREASED STRENGTH, ENDURANCE, AND MOBILITY: ICD-10-CM

## 2022-01-07 DIAGNOSIS — M25.559 HIP PAIN: ICD-10-CM

## 2022-01-07 DIAGNOSIS — G89.29 CHRONIC BILATERAL LOW BACK PAIN WITH BILATERAL SCIATICA: ICD-10-CM

## 2022-01-07 DIAGNOSIS — M54.41 CHRONIC BILATERAL LOW BACK PAIN WITH BILATERAL SCIATICA: ICD-10-CM

## 2022-01-07 DIAGNOSIS — M47.816 LUMBAR SPONDYLOSIS: ICD-10-CM

## 2022-01-07 PROCEDURE — 97112 NEUROMUSCULAR REEDUCATION: CPT | Mod: CQ

## 2022-01-07 PROCEDURE — 97110 THERAPEUTIC EXERCISES: CPT | Mod: CQ

## 2022-01-07 NOTE — PROGRESS NOTES
PT/PTA met face to face to discuss pt's treatment plan and progress towards established goals. Pt will be seen by a physical therapist minimally every 6th visit or every 30 days.        Gaetano Baptiste PTA

## 2022-01-07 NOTE — PROGRESS NOTES
[facility]   Physical Therapy Daily Treatment Note     Name: Shahzad Redd [facility]  Clinic Number: [id number]    Therapy Diagnosis:   Encounter Diagnoses   Name Primary?    Hip pain     Lumbar spondylosis     Lumbar facet arthropathy     Decreased strength, endurance, and mobility     Chronic bilateral low back pain with bilateral sciatica      Physician: Colby Liu MD    Visit Date: 1/7/2022      Physician Orders: PT Eval and Treat  Medical Diagnosis from Referral: lumbar spondylosis, lumbar facet arthropathy  Evaluation Date: 11/26/2021  Authorization Period Expiration: 11/19/2021  Plan of Care Expiration: 2/24/2022  Progress Note Due: 12/24/2021  Visit # / Visits authorized: 11/20 (+1 evaluation)  FOTO: 2/ 3      Precautions: Standard    PTA Visit #: 3/5     Time In: 1640  Time Out: 1715  Total Billable Time: 35 minutes    SUBJECTIVE     Patient reports: feeling good today.    She was compliant with home exercise program due to being too busy.    Response to previous treatment: muscle soreness after last session that lasted about 1/2 day.     Functional change: increased standing tolerance, able to stand and kneel in [employer] without issue, able to get in and out of bed with less difficulty. Minimal loss of balance with house hold distances.     Pain: 0/10     Location: right foot (no pain today)    OBJECTIVE     Objective Measures updated at progress report unless specified.       TREATMENT       MANUAL THERAPY TECHNIQUES were applied for 0 minutes, including:    Manual Intervention Performed Today    Soft Tissue Mobilization   STM Right lateral hip and piriformis muscle   Joint Mobilizations  Lumbar manual traction    Mobilization with movement     Long leg distraction  Bilateral x 2 minutes   Functional Dry Needling        Plan for Next Visit:          THERAPEUTIC EXERCISES to develop strength, endurance, ROM, flexibility, posture and core stabilization for 18 minutes  "including:    Intervention Performed Today    Exercise bike x 5 minutes level 2 for endurance   Transverse abdominis contraction  3 minute     Glut sets  3 minutes    Posterior pelvic tilt  3 minutes   Supine marching with abdominal brace  10 x 3    Supine abdominal brace with bilateral hip flexion   10 x    Supine hip abduction with belt  3 minutes   Sitting hip adduction with ball   3 minutes   Bridges with abdominal brace x 3 x 10 red band at knees arms folded   Piriformis stretch supine   3 x 30 seconds   Hamstring stretch  3 x 30 seconds   clams x 2 x 10 Bilateral  Red band (progressed)   SAQ     Straight leg raise with abdominal brace  3 x 8 (added straight arm pull down red band)   Single knee to chest  5x, 10" holds each side   Sitting lumbar flexion  10 x 10 seconds     Plan for Next Visit:        NEUROMUSCULAR RE-EDUCATION ACTIVITIES to improve Balance, Coordination, Kinesthetic, Sense, Proprioception and Posture for 17 minutes.  The following were included:     Intervention Performed Today    Standing March  x 2 1 minute  no hand support   Side stepping  x 3 minutes no hand support red band at knees   Sit to stands x On hi-low mat, (higher up) 3 x 7 red band at knees (porgressed)   Step ups X    x 6 inch step x 10 no hands with post at top (progressed with balance activity)   X 10 lateral 6 inch box   Forward step downs off of step  6" step, 2 x 10   payloff press  2 minutes each direction   Anti rotation walk outs     1/2 kneeling   X 10 shuoulder flexion, x 5 diagonal upper extremity directions   Table tops  x 10 x 5 second hold (added)     Plan for Next Visit:         PATIENT EDUCATION AND HOME EXERCISES     Home Exercises Provided and Patient Education Provided     Education provided: (during session) minutes   Patient educated on biomechanical justification for therapeutic exercise and importance of compliance with HEP in order to improve overall impairments and QOL    Patient was educated on all " the above exercise prior/during/after for proper posture, positioning, and execution for safe performance with home exercise program.     Written Home Exercises Provided: yes.  Exercises were reviewed and Shahzad was able to demonstrate them prior to the end of the session.  Shahzad demonstrated fair  understanding of the education provided. See EMR under Patient Instructions for exercises provided during therapy sessions.      ASSESSMENT     Patient tolerated treatment with progressions with good quality but noted minimal decreased stability with step ups with post on 6 inch box. Good quality noted with posterior pelvic tilt with all exercises. Patient came to this session 10 minutes late.     Shahzad is progressing well towards her goals.   Pt prognosis is Good.     Pt will continue to benefit from skilled outpatient physical therapy to address the deficits listed in the problem list box on initial evaluation, provide pt/family education and to maximize pt's level of independence in the home and community environment.     Pt's spiritual, cultural and educational needs considered and pt agreeable to plan of care and goals.       Anticipated Barriers for therapy: co-morbidities, sedentary lifestyle, chronicity of condition and adherence to treatment plan      GOALS:     Short Term Goals:  6 weeks Progress    1. Pain: Pt will demonstrate improved pain by reports of less than or equal to 6/10 worst pain on the verbal rating scale in order to progress toward maximal functional ability and improve QOL. Met   2. Function: Patient will demonstrate improved function as indicated by a functional limitation score of less than or equal to 49 out of 100 on FOTO. Met   3. Mobility: Patient will improve AROM to 50% of stated goals, listed in objective measures above, in order to progress towards independence with functional activities.  Met   4. Strength: Patient will improve strength to 50% of stated goals, listed in objective measures  above, in order to progress towards independence with functional activities.  PM   5. Gait: Patient will demonstrate improved gait mechanics in order to improve functional mobility, improve quality of life, and decrease risk of further injury or fall.  Met   6. HEP: Patient will demonstrate independence with HEP in order to progress toward functional independence. Met   7. Improve postural awareness.  Met      Long Term Goals:  12 weeks Progress   1. Pain: Pt will demonstrate improved pain by reports of less than or equal to 4/10 worst pain on the verbal rating scale in order to progress toward maximal functional ability and improve QOL.   Met   2. Function: Patient will demonstrate improved function as indicated by a functional limitation score of less than or equal to 42 out of 100 on FOTO. PC   3. Mobility: Patient will improve AROM to stated goals, listed in objective measures above, in order to return to maximal functional potential and improve quality of life. PM   4. Strength: Patient will improve strength to stated goals, listed in objective measures above, in order to improve functional independence and quality of life. PM   5. Gait: Patient will demonstrate normalized gait mechanics with minimal compensation in order to return to PLOF. PM   6. Patient will return to normal ADL's, IADL's, community involvement, recreational activities, and work-related activities with less than or equal to 2/10 pain and maximal function.  PC        Goals Key:  PC= progressing/continue; PM= partially met;        DC= discontinue    PLAN     Continue Plan of Care to strengthen core and hips allowing her to be able to return to all daily activities with no pain.     12/29/2021: It is my recommendation that patient continue with PT at her current frequency of 2 times per week for the remainder of her approved visits. Her treatment plan will remain the same, and she will be progressed appropriately.     Gaetano Baptiste, PTA

## 2022-01-12 ENCOUNTER — CLINICAL SUPPORT (OUTPATIENT)
Dept: REHABILITATION | Facility: HOSPITAL | Age: 62
End: 2022-01-12
Payer: COMMERCIAL

## 2022-01-12 DIAGNOSIS — R53.1 DECREASED STRENGTH, ENDURANCE, AND MOBILITY: ICD-10-CM

## 2022-01-12 DIAGNOSIS — M47.816 LUMBAR FACET ARTHROPATHY: ICD-10-CM

## 2022-01-12 DIAGNOSIS — G89.29 CHRONIC BILATERAL LOW BACK PAIN WITH BILATERAL SCIATICA: ICD-10-CM

## 2022-01-12 DIAGNOSIS — M54.41 CHRONIC BILATERAL LOW BACK PAIN WITH BILATERAL SCIATICA: ICD-10-CM

## 2022-01-12 DIAGNOSIS — M47.816 LUMBAR SPONDYLOSIS: ICD-10-CM

## 2022-01-12 DIAGNOSIS — M54.42 CHRONIC BILATERAL LOW BACK PAIN WITH BILATERAL SCIATICA: ICD-10-CM

## 2022-01-12 DIAGNOSIS — M25.559 HIP PAIN: ICD-10-CM

## 2022-01-12 DIAGNOSIS — Z74.09 DECREASED STRENGTH, ENDURANCE, AND MOBILITY: ICD-10-CM

## 2022-01-12 DIAGNOSIS — R68.89 DECREASED STRENGTH, ENDURANCE, AND MOBILITY: ICD-10-CM

## 2022-01-12 PROCEDURE — 97112 NEUROMUSCULAR REEDUCATION: CPT | Performed by: PHYSICAL THERAPIST

## 2022-01-12 PROCEDURE — 97110 THERAPEUTIC EXERCISES: CPT | Performed by: PHYSICAL THERAPIST

## 2022-01-12 NOTE — PROGRESS NOTES
[facility]   Physical Therapy Daily Treatment Note     Name: Shahzad Redd [facility]  Clinic Number: [id number]    Therapy Diagnosis:   Encounter Diagnoses   Name Primary?    Hip pain     Lumbar spondylosis     Lumbar facet arthropathy     Decreased strength, endurance, and mobility     Chronic bilateral low back pain with bilateral sciatica      Physician: Colby Liu MD    Visit Date: 1/12/2022      Physician Orders: PT Eval and Treat  Medical Diagnosis from Referral: lumbar spondylosis, lumbar facet arthropathy  Evaluation Date: 11/26/2021  Authorization Period Expiration: 6/1/2022  Plan of Care Expiration: 2/24/2022  Progress Note Due: 1/228/2022  Visit # / Visits authorized: 3/20 (9+1 evaluation)  FOTO: 2/ 3      Precautions: Standard    PTA Visit #: 0/5     Time In: 1657  Time Out: 1730  Total Billable Time: 30 minutes    SUBJECTIVE     Patient reports: that she really only has some soreness in her low back today from sitting all day for work. Her back has been feeling better overall. She still gets occasional numbness and tingling in her toes, but it hasn't been as often.     She was compliant with home exercise program due to being too busy.    Response to previous treatment: muscle soreness after last session that lasted about 1/2 day.     Functional change: increased standing tolerance, able to stand and kneel in [place] without issue, able to get in and out of bed with less difficulty. Minimal loss of balance with house hold distances.     Pain: 1/10     Location: right foot (no pain today)    OBJECTIVE     Objective Measures updated at progress report unless specified.       TREATMENT       MANUAL THERAPY TECHNIQUES were applied for 0 minutes, including:    Manual Intervention Performed Today    Soft Tissue Mobilization   STM Right lateral hip and piriformis muscle   Joint Mobilizations  Lumbar manual traction    Mobilization with movement     Long leg distraction   "Bilateral x 2 minutes   Functional Dry Needling        Plan for Next Visit:          THERAPEUTIC EXERCISES to develop strength, endurance, ROM, flexibility, posture and core stabilization for 15 minutes including:    Intervention Performed Today    Exercise bike (try upright bike or shuttle next visit) x 5 minutes level 3 for endurance   Transverse abdominis contraction  3 minute     Glut sets  3 minutes    Posterior pelvic tilt  3 minutes   Supine marching with abdominal brace  10 x 3    Supine abdominal brace with bilateral hip flexion   10 x    Supine hip abduction with belt  3 minutes   Sitting hip adduction with ball   3 minutes   Bridges with abdominal brace x 3 x 10 red band at knees arms folded   Piriformis stretch supine   3 x 30 seconds   Hamstring stretch  3 x 30 seconds   clams x 2 x 10 Bilateral  Red band    SAQ     Straight leg raise with abdominal brace  3 x 8 (added straight arm pull down red band)   Single knee to chest  5x, 10" holds each side   Sitting lumbar flexion  10 x 10 seconds     Plan for Next Visit:        NEUROMUSCULAR RE-EDUCATION ACTIVITIES to improve Balance, Coordination, Kinesthetic, Sense, Proprioception and Posture for 15 minutes.  The following were included:     Intervention Performed Today    Standing March  x 2 1 minute  no hand support   Side stepping  x 3 minutes no hand support red band at knees   Sit to stands  On hi-low mat, (higher up) 3 x 7 red band at knees (porgressed)   Step ups X    x 6 inch step x 10 no hands with post at top (progressed with balance activity)   X 10 lateral 6 inch box   Forward step downs off of step  6" step, 2 x 10   payloff press  2 minutes each direction   Anti rotation walk outs     1/2 kneeling   X 10 shuoulder flexion, x 5 diagonal upper extremity directions   Table tops  x 10 x 5 second hold      Plan for Next Visit:         PATIENT EDUCATION AND HOME EXERCISES     Home Exercises Provided and Patient Education Provided     Education " "provided: (during session) minutes   Patient educated on biomechanical justification for therapeutic exercise and importance of compliance with HEP in order to improve overall impairments and QOL    Patient was educated on all the above exercise prior/during/after for proper posture, positioning, and execution for safe performance with home exercise program.     Written Home Exercises Provided: yes.  Exercises were reviewed and Shahzad was able to demonstrate them prior to the end of the session.  Shahzad demonstrated fair  understanding of the education provided. See EMR under Patient Instructions for exercises provided during therapy sessions.      ASSESSMENT     Patient did well with treatment today. She arrived 13 minutes late for her appointment today, so treatment was shortened this visit. Patient did well with exercises performed and demonstrated less difficulty. She had difficulty with holding post for SLS on 6" step ups and need occasional UE support to prevent LOB. Educated on proper breathing with Pilate's Table Top, which helped improve form and ability with exercise. Her core strength continues to improve as compared to previous visits, and she is progressing well overall towards goals.     Shahzad is progressing well towards her goals.   Pt prognosis is Good.     Pt will continue to benefit from skilled outpatient physical therapy to address the deficits listed in the problem list box on initial evaluation, provide pt/family education and to maximize pt's level of independence in the home and community environment.     Pt's spiritual, cultural and educational needs considered and pt agreeable to plan of care and goals.       Anticipated Barriers for therapy: co-morbidities, sedentary lifestyle, chronicity of condition and adherence to treatment plan      GOALS:     Short Term Goals:  6 weeks Progress    1. Pain: Pt will demonstrate improved pain by reports of less than or equal to 6/10 worst pain on the " verbal rating scale in order to progress toward maximal functional ability and improve QOL. Met   2. Function: Patient will demonstrate improved function as indicated by a functional limitation score of less than or equal to 49 out of 100 on FOTO. Met   3. Mobility: Patient will improve AROM to 50% of stated goals, listed in objective measures above, in order to progress towards independence with functional activities.  Met   4. Strength: Patient will improve strength to 50% of stated goals, listed in objective measures above, in order to progress towards independence with functional activities.  PM   5. Gait: Patient will demonstrate improved gait mechanics in order to improve functional mobility, improve quality of life, and decrease risk of further injury or fall.  Met   6. HEP: Patient will demonstrate independence with HEP in order to progress toward functional independence. Met   7. Improve postural awareness.  Met      Long Term Goals:  12 weeks Progress   1. Pain: Pt will demonstrate improved pain by reports of less than or equal to 4/10 worst pain on the verbal rating scale in order to progress toward maximal functional ability and improve QOL.   Met   2. Function: Patient will demonstrate improved function as indicated by a functional limitation score of less than or equal to 42 out of 100 on FOTO. PC   3. Mobility: Patient will improve AROM to stated goals, listed in objective measures above, in order to return to maximal functional potential and improve quality of life. PM   4. Strength: Patient will improve strength to stated goals, listed in objective measures above, in order to improve functional independence and quality of life. PM   5. Gait: Patient will demonstrate normalized gait mechanics with minimal compensation in order to return to PLOF. PM   6. Patient will return to normal ADL's, IADL's, community involvement, recreational activities, and work-related activities with less than or equal to  2/10 pain and maximal function.  PC        Goals Key:  PC= progressing/continue; PM= partially met;        DC= discontinue    PLAN     Continue Plan of Care to strengthen core and hips allowing her to be able to return to all daily activities with no pain.     12/29/2021: It is my recommendation that patient continue with PT at her current frequency of 2 times per week for the remainder of her approved visits. Her treatment plan will remain the same, and she will be progressed appropriately.     Evelyn Soto, PT

## 2022-01-14 ENCOUNTER — PATIENT OUTREACH (OUTPATIENT)
Dept: ADMINISTRATIVE | Facility: OTHER | Age: 62
End: 2022-01-14
Payer: COMMERCIAL

## 2022-01-14 ENCOUNTER — CLINICAL SUPPORT (OUTPATIENT)
Dept: REHABILITATION | Facility: HOSPITAL | Age: 62
End: 2022-01-14
Payer: COMMERCIAL

## 2022-01-14 DIAGNOSIS — R68.89 DECREASED STRENGTH, ENDURANCE, AND MOBILITY: ICD-10-CM

## 2022-01-14 DIAGNOSIS — M54.42 CHRONIC BILATERAL LOW BACK PAIN WITH BILATERAL SCIATICA: ICD-10-CM

## 2022-01-14 DIAGNOSIS — G89.29 CHRONIC BILATERAL LOW BACK PAIN WITH BILATERAL SCIATICA: ICD-10-CM

## 2022-01-14 DIAGNOSIS — Z74.09 DECREASED STRENGTH, ENDURANCE, AND MOBILITY: ICD-10-CM

## 2022-01-14 DIAGNOSIS — R53.1 DECREASED STRENGTH, ENDURANCE, AND MOBILITY: ICD-10-CM

## 2022-01-14 DIAGNOSIS — M54.41 CHRONIC BILATERAL LOW BACK PAIN WITH BILATERAL SCIATICA: ICD-10-CM

## 2022-01-14 DIAGNOSIS — M47.816 LUMBAR FACET ARTHROPATHY: ICD-10-CM

## 2022-01-14 DIAGNOSIS — M25.559 HIP PAIN: ICD-10-CM

## 2022-01-14 DIAGNOSIS — M47.816 LUMBAR SPONDYLOSIS: ICD-10-CM

## 2022-01-14 PROCEDURE — 97110 THERAPEUTIC EXERCISES: CPT | Mod: CQ

## 2022-01-14 NOTE — PROGRESS NOTES
[facility]   Physical Therapy Daily Treatment Note     Name: Shahzad Redd [facility]  Clinic Number: [id number]    Therapy Diagnosis:   Encounter Diagnoses   Name Primary?    Hip pain     Lumbar spondylosis     Lumbar facet arthropathy     Decreased strength, endurance, and mobility     Chronic bilateral low back pain with bilateral sciatica      Physician: Colby Liu MD    Visit Date: 1/14/2022      Physician Orders: PT Eval and Treat  Medical Diagnosis from Referral: lumbar spondylosis, lumbar facet arthropathy  Evaluation Date: 11/26/2021  Authorization Period Expiration: 6/1/2022  Plan of Care Expiration: 2/24/2022  Progress Note Due: 1/228/2022  Visit # / Visits authorized: 4/20 (9+1 evaluation)  FOTO: 2/ 3      Precautions: Standard    PTA Visit #: 1/5     Time In: 1650  Time Out: 1717  Total Billable Time: 25 minutes (patient came to this appointment 20 minutes late today)    SUBJECTIVE     Patient reports: that she has been sitting working all day and is having numbness in her left great toe    She was compliant with home exercise program due to being too busy.    Response to previous treatment: muscle soreness after last session that lasted about 1/2 day.     Functional change: increased standing tolerance, able to stand and kneel in [employer] without issue, able to get in and out of bed with less difficulty. Minimal loss of balance with house hold distances.     Pain: 2/10     Location: left great toe     OBJECTIVE     Objective Measures updated at progress report unless specified.       TREATMENT       MANUAL THERAPY TECHNIQUES were applied for 0 minutes, including:    Manual Intervention Performed Today    Soft Tissue Mobilization   STM Right lateral hip and piriformis muscle   Joint Mobilizations  Lumbar manual traction    Mobilization with movement     Long leg distraction  Bilateral x 2 minutes   Functional Dry Needling        Plan for Next Visit:          THERAPEUTIC  "EXERCISES to develop strength, endurance, ROM, flexibility, posture and core stabilization for 0 minutes including:    Intervention Performed Today    Exercise bike (try upright bike or shuttle next visit)  5 minutes level 3 for endurance   Transverse abdominis contraction  3 minute     Glut sets  3 minutes    Posterior pelvic tilt  3 minutes   Supine marching with abdominal brace  10 x 3    Supine abdominal brace with bilateral hip flexion   10 x    Supine hip abduction with belt  3 minutes   Sitting hip adduction with ball   3 minutes   Bridges with abdominal brace  3 x 10 red band at knees arms folded   Piriformis stretch supine   3 x 30 seconds   Hamstring stretch  3 x 30 seconds   clams  2 x 10 Bilateral  Red band    SAQ     Straight leg raise with abdominal brace  3 x 8 (added straight arm pull down red band)   Single knee to chest  5x, 10" holds each side   Sitting lumbar flexion  10 x 10 seconds     Plan for Next Visit:        NEUROMUSCULAR RE-EDUCATION ACTIVITIES to improve Balance, Coordination, Kinesthetic, Sense, Proprioception and Posture for 25 minutes.  The following were included:     Intervention Performed Today    Standing March x x 2 1 minute  no hand support   Side stepping   3 minutes no hand support red band at knees   Sit to stands x On chair with airex pad 3 x 7 red band at knees    Step ups X  x 6 inch step 2 x 10 no hands    X 10 lateral 6 inch box   Forward step downs off of step  6" step, 2 x 10   payloff press  2 minutes each direction   Anti rotation walk outs x X 10 each direction red band arms outstretched   1/2 kneeling   X 10 shuoulder flexion, x 5 diagonal upper extremity directions   Table tops  x 2 x 10 5 second hold (increased)   Table tops with alternating upper extremity overhead reach x 3 x 5     Plan for Next Visit:         PATIENT EDUCATION AND HOME EXERCISES     Home Exercises Provided and Patient Education Provided     Education provided: (during session) " minutes   Patient educated on biomechanical justification for therapeutic exercise and importance of compliance with HEP in order to improve overall impairments and QOL    Patient was educated on all the above exercise prior/during/after for proper posture, positioning, and execution for safe performance with home exercise program.     Written Home Exercises Provided: yes.  Exercises were reviewed and Shahzad was able to demonstrate them prior to the end of the session.  Shahzad demonstrated fair  understanding of the education provided. See EMR under Patient Instructions for exercises provided during therapy sessions.      ASSESSMENT     Patient was late for this appointment today. Patient did well with progressions of exercises with no complaints of pain but with fatigue noted. Her core strength continues to improve and she is progressing well overall towards goals.     Shahzad is progressing well towards her goals.   Pt prognosis is Good.     Pt will continue to benefit from skilled outpatient physical therapy to address the deficits listed in the problem list box on initial evaluation, provide pt/family education and to maximize pt's level of independence in the home and community environment.     Pt's spiritual, cultural and educational needs considered and pt agreeable to plan of care and goals.       Anticipated Barriers for therapy: co-morbidities, sedentary lifestyle, chronicity of condition and adherence to treatment plan      GOALS:     Short Term Goals:  6 weeks Progress    1. Pain: Pt will demonstrate improved pain by reports of less than or equal to 6/10 worst pain on the verbal rating scale in order to progress toward maximal functional ability and improve QOL. Met   2. Function: Patient will demonstrate improved function as indicated by a functional limitation score of less than or equal to 49 out of 100 on FOTO. Met   3. Mobility: Patient will improve AROM to 50% of stated goals, listed in objective  measures above, in order to progress towards independence with functional activities.  Met   4. Strength: Patient will improve strength to 50% of stated goals, listed in objective measures above, in order to progress towards independence with functional activities.  PM   5. Gait: Patient will demonstrate improved gait mechanics in order to improve functional mobility, improve quality of life, and decrease risk of further injury or fall.  Met   6. HEP: Patient will demonstrate independence with HEP in order to progress toward functional independence. Met   7. Improve postural awareness.  Met      Long Term Goals:  12 weeks Progress   1. Pain: Pt will demonstrate improved pain by reports of less than or equal to 4/10 worst pain on the verbal rating scale in order to progress toward maximal functional ability and improve QOL.   Met   2. Function: Patient will demonstrate improved function as indicated by a functional limitation score of less than or equal to 42 out of 100 on FOTO. PC   3. Mobility: Patient will improve AROM to stated goals, listed in objective measures above, in order to return to maximal functional potential and improve quality of life. PM   4. Strength: Patient will improve strength to stated goals, listed in objective measures above, in order to improve functional independence and quality of life. PM   5. Gait: Patient will demonstrate normalized gait mechanics with minimal compensation in order to return to PLOF. PM   6. Patient will return to normal ADL's, IADL's, community involvement, recreational activities, and work-related activities with less than or equal to 2/10 pain and maximal function.  PC        Goals Key:  PC= progressing/continue; PM= partially met;        DC= discontinue    PLAN     Continue Plan of Care to strengthen core and hips allowing her to be able to return to all daily activities with no pain.     12/29/2021: It is my recommendation that patient continue with PT at her  current frequency of 2 times per week for the remainder of her approved visits. Her treatment plan will remain the same, and she will be progressed appropriately.     Gaetano Baptiste, PTA

## 2022-01-18 ENCOUNTER — HOSPITAL ENCOUNTER (OUTPATIENT)
Dept: RADIOLOGY | Facility: HOSPITAL | Age: 62
Discharge: HOME | End: 2022-01-18
Attending: ANESTHESIOLOGY
Payer: COMMERCIAL

## 2022-01-18 ENCOUNTER — OFFICE VISIT (OUTPATIENT)
Dept: PAIN MEDICINE | Facility: CLINIC | Age: 62
End: 2022-01-18
Payer: COMMERCIAL

## 2022-01-18 VITALS
BODY MASS INDEX: 25.19 KG/M2 | WEIGHT: 186 LBS | HEIGHT: 72 IN | HEART RATE: 92 BPM | DIASTOLIC BLOOD PRESSURE: 75 MMHG | SYSTOLIC BLOOD PRESSURE: 126 MMHG

## 2022-01-18 DIAGNOSIS — M79.644 PAIN OF RIGHT THUMB: ICD-10-CM

## 2022-01-18 DIAGNOSIS — M47.816 LUMBAR FACET ARTHROPATHY: ICD-10-CM

## 2022-01-18 DIAGNOSIS — M47.816 LUMBAR SPONDYLOSIS: ICD-10-CM

## 2022-01-18 DIAGNOSIS — M54.16 LUMBAR RADICULOPATHY: ICD-10-CM

## 2022-01-18 PROCEDURE — 73130 X-RAY EXAM OF HAND: CPT | Mod: TC,RT

## 2022-01-18 PROCEDURE — 99214 PR OFFICE/OUTPT VISIT, EST, LEVL IV, 30-39 MIN: ICD-10-PCS | Mod: S$GLB,,, | Performed by: ANESTHESIOLOGY

## 2022-01-18 PROCEDURE — 99999 PR PBB SHADOW E&M-EST. PATIENT-LVL V: ICD-10-PCS | Mod: PBBFAC,,, | Performed by: ANESTHESIOLOGY

## 2022-01-18 PROCEDURE — 73130 XR HAND COMPLETE 3 VIEW RIGHT: ICD-10-PCS | Mod: 26,RT,, | Performed by: RADIOLOGY

## 2022-01-18 PROCEDURE — 73130 X-RAY EXAM OF HAND: CPT | Mod: 26,RT,, | Performed by: RADIOLOGY

## 2022-01-18 PROCEDURE — 99999 PR PBB SHADOW E&M-EST. PATIENT-LVL V: CPT | Mod: PBBFAC,,, | Performed by: ANESTHESIOLOGY

## 2022-01-18 PROCEDURE — 99214 OFFICE O/P EST MOD 30 MIN: CPT | Mod: S$GLB,,, | Performed by: ANESTHESIOLOGY

## 2022-01-18 RX ORDER — GABAPENTIN 300 MG/1
300 CAPSULE ORAL NIGHTLY
Qty: 30 CAPSULE | Refills: 1 | Status: SHIPPED | OUTPATIENT
Start: 2022-01-18 | End: 2023-10-18

## 2022-01-18 RX ORDER — NIACIN 500 MG/1
500 TABLET, EXTENDED RELEASE ORAL 2 TIMES DAILY WITH MEALS
COMMUNITY

## 2022-01-18 NOTE — PROGRESS NOTES
Chief Pain Complaint:  Lumbar Back Pain   Left Buttock Pain   Coccyx Pain       History of Present Illness:   Shahzad Feliz is a 61 y.o. female  who is presenting with a chief complaint of buttock pain. The patient began experiencing this problem insidiously, and the pain has been gradually worsening over the past 12 month(s). The pain is described as throbbing, cramping, aching and heavy and is located in the bilateral buttock. Pain is intermittent and lasts hours. The  pain is nonradiating. The patient rates her pain a 3 out of ten and interferes with activities of daily living a 3 out of ten. Pain is exacerbated by getting up from a seated position, and is improved by rest. Patient reports no prior trauma, no prior spinal surgery     Shahzad Feliz is a 61 y.o. female  who is presenting with a chief complaint of lumbar back pain. The patient began experiencing this problem insidiously, and the pain has been gradually worsening over the past 6 month(s). The pain is described as throbbing, shooting, burning and electrical and is located in the right lumbar spine . Pain is intermittent and lasts hours. The pain radiates to right leg. The patient rates her pain a 4 out of ten and interferes with activities of daily living a 4 out of ten. Pain is exacerbated by flexion of the lumbar spine, and is improved by rest. Patient reports no prior trauma, no prior spinal surgery       - pertinent negatives: No fever, No chills, No weight loss, No bladder dysfunction, No bowel dysfunction, No saddle anesthesia  - pertinent positives: none    - medications, other therapies tried (physical therapy, injections):     >> NSAIDs and Tylenol    >> Has previously undergone Physical Therapy    >> Has NOT previously undergone spinal injection/s      Imaging / Labs / Studies (reviewed on 1/18/2022):      Review of Systems:  CONSTITUTIONAL: patient denies any fever, chills, or weight loss  SKIN: patient denies any rash or  itching  RESPIRATORY: patient denies having any shortness of breath  GASTROINTESTINAL: patient denies having any diarrhea, constipation, or bowel incontinence  GENITOURINARY: patient denies having any abnormal bladder function    MUSCULOSKELETAL:  - patient complains of the above noted pain/s (see chief pain complaint)    NEUROLOGICAL:   - pain as above  - strength in Lower extremities is intact, BILATERALLY  - sensation in Lower extremities is intact, BILATERALLY  - patient denies any loss of bowel or bladder control      PSYCHIATRIC: patient denies any change in mood    Other:  All other systems reviewed and are negative      Physical Exam:  /75   Pulse 92   Ht 6' (1.829 m)   Wt 84.4 kg (186 lb)   BMI 25.23 kg/m²  (reviewed on 1/18/2022)  General: Alert and oriented, in no apparent distress.  Gait: normal gait.  Skin: No rashes, No discoloration, No obvious lesions  HEENT: Normocephalic, atraumatic. Pupils equal and round.  Cardiovascular: Regular rate and rhythm , no significant peripheral edema present  Respiratory: Without audible wheezing, without use of accessory muscles of respiration.    Musculoskeletal:    Cervical Spine    - Pain on flexion of cervical spine Absent  - Spurling's Test:  Absent    - Pain on extension of cervical spine Absent  - TTP over the cervical facet joints Absent  - Cervical facet loading Absent      Lumbar Spine    - Pain on flexion of lumbar spine Absent  - Straight Leg Raise:  Absent    - Pain on extension of lumbar spine Present  - TTP over the lumbar facet joints Present  - Lumbar facet loading Present    -Pain on palpation over the SI joint  Present on left  - SUZY: Present    - Pain in internal/ external rotation of the left hip     -TTP over coccyx       Neuro:    Strength:  UE R/L: D: 5/5; B: 5/5; T: 5/5; WF: 5/5; WE: 5/5; IO: 5/5;  LE R/L: HF: 5/5, HE: 5/5, KF: 5/5; KE: 5/5; FE: 5/5; FF: 5/5    Extremity Reflexes: Brisk and symmetric throughout.      Extremity  Sensory: Sensation to pinprick and temperature symmetric. Proprioception intact.      Psych:  Mood and affect is appropriate      Assessment:    Shahzad Feliz is a 61 y.o. year old female who is presenting with     Encounter Diagnoses   Name Primary?    Pain of right thumb Yes    Lumbar facet arthropathy     Lumbar spondylosis        Plan:    1. Interventional: None for now. Patient is not interested in injections.     2. Pharmacologic: Start Gabapentin 300 mg PO QHs PRN. Continue Naproxen PRN. Curcumin.     3. Rehabilitative: Internal referal to PT done at last visit which is helping a lot. Continue PT.    4. Diagnostic: Lumbar xray. Left Hip Xray reviewed. Right Hand xray.     5. Consult: Dr Cruz for right thump interphalangeal joint pain.     6.  Follow up: PRN.     20 minutes were spent in this encounter with more than 50% of the time used for counseling and review of the plan.  Imaging / studies reviewed, detailed above.  I discussed in detail the risks, benefits, and alternatives to any and all potential treatment options.  All questions and concerns were fully addressed today in clinic. Medical decision making moderate.    Thank you for the opportunity to assist in the care of this patient.    Best wishes,    Signed:    Colby Liu MD          Disclaimer:  This note may have been prepared using voice recognition software, it may have not been extensively proofed, as such there could be errors within the text such as sound alike errors.

## 2022-01-19 ENCOUNTER — CLINICAL SUPPORT (OUTPATIENT)
Dept: REHABILITATION | Facility: HOSPITAL | Age: 62
End: 2022-01-19
Payer: COMMERCIAL

## 2022-01-19 DIAGNOSIS — M54.41 CHRONIC BILATERAL LOW BACK PAIN WITH BILATERAL SCIATICA: ICD-10-CM

## 2022-01-19 DIAGNOSIS — M25.559 HIP PAIN: ICD-10-CM

## 2022-01-19 DIAGNOSIS — R53.1 DECREASED STRENGTH, ENDURANCE, AND MOBILITY: ICD-10-CM

## 2022-01-19 DIAGNOSIS — R68.89 DECREASED STRENGTH, ENDURANCE, AND MOBILITY: ICD-10-CM

## 2022-01-19 DIAGNOSIS — M47.816 LUMBAR FACET ARTHROPATHY: ICD-10-CM

## 2022-01-19 DIAGNOSIS — Z74.09 DECREASED STRENGTH, ENDURANCE, AND MOBILITY: ICD-10-CM

## 2022-01-19 DIAGNOSIS — G89.29 CHRONIC BILATERAL LOW BACK PAIN WITH BILATERAL SCIATICA: ICD-10-CM

## 2022-01-19 DIAGNOSIS — M54.42 CHRONIC BILATERAL LOW BACK PAIN WITH BILATERAL SCIATICA: ICD-10-CM

## 2022-01-19 DIAGNOSIS — M47.816 LUMBAR SPONDYLOSIS: ICD-10-CM

## 2022-01-19 PROCEDURE — 97112 NEUROMUSCULAR REEDUCATION: CPT | Performed by: PHYSICAL THERAPIST

## 2022-01-19 NOTE — PROGRESS NOTES
[facility]   Physical Therapy Daily Treatment Note     Name: Shahzad Redd [facility] Number: [id number]    Therapy Diagnosis:   Encounter Diagnoses   Name Primary?    Hip pain     Lumbar spondylosis     Lumbar facet arthropathy     Decreased strength, endurance, and mobility     Chronic bilateral low back pain with bilateral sciatica      Physician: Colby Liu MD    Visit Date: 1/19/2022      Physician Orders: PT Eval and Treat  Medical Diagnosis from Referral: lumbar spondylosis, lumbar facet arthropathy  Evaluation Date: 11/26/2021  Authorization Period Expiration: 6/1/2022  Plan of Care Expiration: 2/24/2022  Progress Note Due: 1/28/2022  Visit # / Visits authorized: 5/20 (9+1 evaluation)  FOTO: 2/ 3      Precautions: Standard    PTA Visit #: 0/5     Time In: 1652  Time Out: 1730  Total Billable Time: 38 minutes (patient came to this appointment 20 minutes late today)    SUBJECTIVE     Patient reports: that she feels just a little bit of numbness in her right big toe today. She is not feeling the numbness at all in the leg. Patient reports no pain today. She saw her MD yesterday, and she reports that he said she is doing much better overall. Patient states that he would like for her to continue PT, and he also called in a new medication for her (she thinks it was for inflammation). However, she states that he told her it will most likely make her drowsy so she is not sure how much she will take it.     She was compliant with home exercise program due to being too busy.    Response to previous treatment: muscle soreness after last session that lasted about 1/2 day.     Functional change: increased standing tolerance, able to stand and kneel in [place] without issue, able to get in and out of bed with less difficulty. Minimal loss of balance with house hold distances.     Pain: 0/10     Location: left great toe     OBJECTIVE     Objective Measures updated at progress report  "unless specified.       TREATMENT       MANUAL THERAPY TECHNIQUES were applied for 0 minutes, including:    Manual Intervention Performed Today    Soft Tissue Mobilization   STM Right lateral hip and piriformis muscle   Joint Mobilizations  Lumbar manual traction    Mobilization with movement     Long leg distraction  Bilateral x 2 minutes   Functional Dry Needling        Plan for Next Visit:          THERAPEUTIC EXERCISES to develop strength, endurance, ROM, flexibility, posture and core stabilization for 0 minutes including:    Intervention Performed Today    Exercise bike (try upright bike or shuttle next visit)  5 minutes level 3 for endurance   Transverse abdominis contraction  3 minute     Glut sets  3 minutes    Posterior pelvic tilt  3 minutes   Supine marching with abdominal brace  10 x 3    Supine abdominal brace with bilateral hip flexion   10 x    Supine hip abduction with belt  3 minutes   Sitting hip adduction with ball   3 minutes   Bridges with abdominal brace  3 x 10 red band at knees arms folded   Piriformis stretch supine   3 x 30 seconds   Hamstring stretch  3 x 30 seconds   clams  2 x 10 Bilateral  Red band    SAQ     Straight leg raise with abdominal brace  3 x 8 (added straight arm pull down red band)   Single knee to chest  5x, 10" holds each side   Sitting lumbar flexion  10 x 10 seconds     Plan for Next Visit:        NEUROMUSCULAR RE-EDUCATION ACTIVITIES to improve Balance, Coordination, Kinesthetic, Sense, Proprioception and Posture for 38 minutes.  The following were included:     Intervention Performed Today    Standing March x x 2 1 minute  no hand support   Side stepping   3 minutes no hand support red band at knees   Sit to stands x On chair with airex pad 3 x 7 red band at knees    Step ups X  x 6 inch step 2 x 10 no hands    X 10 lateral 6 inch box   Forward step downs off of step  6" step, 2 x 10   payloff press  2 minutes each direction   Anti rotation walk outs x X 10 each " "direction red band arms outstretched   1/2 kneeling   X 10 shuoulder flexion, x 5 diagonal upper extremity directions   Table tops  x 2 x 10 5 second hold (increased)   Table tops with alternating upper extremity overhead reach x 3 x 5   Dead bugs x 2 x 20"    Quadruped alt LE x ~2 x 10   Suitcase carry x Down and back on turn along two mirrors, 4#   Farmer's March x One arm up, one arm at side, 2# each hand, 10x each LE          Plan for Next Visit:         PATIENT EDUCATION AND HOME EXERCISES     Home Exercises Provided and Patient Education Provided     Education provided: (during session) minutes   Patient educated on biomechanical justification for therapeutic exercise and importance of compliance with HEP in order to improve overall impairments and QOL    Patient was educated on all the above exercise prior/during/after for proper posture, positioning, and execution for safe performance with home exercise program.     Written Home Exercises Provided: yes.  Exercises were reviewed and Shahzad was able to demonstrate them prior to the end of the session.  Shahzad demonstrated fair  understanding of the education provided. See EMR under Patient Instructions for exercises provided during therapy sessions.      ASSESSMENT     Patient did very well with treatment today. She was able to be progressed to more core and functional strengthening activities. She requires occasional cues to activate core as well as postural stabilizers. Patient still fatigues easily, but less than previous visits as she demonstrates improving strength and muscular endurance. She is progressing very well overall.     Shahzad is progressing well towards her goals.   Pt prognosis is Good.     Pt will continue to benefit from skilled outpatient physical therapy to address the deficits listed in the problem list box on initial evaluation, provide pt/family education and to maximize pt's level of independence in the home and community environment. "     Pt's spiritual, cultural and educational needs considered and pt agreeable to plan of care and goals.       Anticipated Barriers for therapy: co-morbidities, sedentary lifestyle, chronicity of condition and adherence to treatment plan      GOALS:     Short Term Goals:  6 weeks Progress    1. Pain: Pt will demonstrate improved pain by reports of less than or equal to 6/10 worst pain on the verbal rating scale in order to progress toward maximal functional ability and improve QOL. Met   2. Function: Patient will demonstrate improved function as indicated by a functional limitation score of less than or equal to 49 out of 100 on FOTO. Met   3. Mobility: Patient will improve AROM to 50% of stated goals, listed in objective measures above, in order to progress towards independence with functional activities.  Met   4. Strength: Patient will improve strength to 50% of stated goals, listed in objective measures above, in order to progress towards independence with functional activities.  PM   5. Gait: Patient will demonstrate improved gait mechanics in order to improve functional mobility, improve quality of life, and decrease risk of further injury or fall.  Met   6. HEP: Patient will demonstrate independence with HEP in order to progress toward functional independence. Met   7. Improve postural awareness.  Met      Long Term Goals:  12 weeks Progress   1. Pain: Pt will demonstrate improved pain by reports of less than or equal to 4/10 worst pain on the verbal rating scale in order to progress toward maximal functional ability and improve QOL.   Met   2. Function: Patient will demonstrate improved function as indicated by a functional limitation score of less than or equal to 42 out of 100 on FOTO. PC   3. Mobility: Patient will improve AROM to stated goals, listed in objective measures above, in order to return to maximal functional potential and improve quality of life. PM   4. Strength: Patient will improve  strength to stated goals, listed in objective measures above, in order to improve functional independence and quality of life. PM   5. Gait: Patient will demonstrate normalized gait mechanics with minimal compensation in order to return to PLOF. PM   6. Patient will return to normal ADL's, IADL's, community involvement, recreational activities, and work-related activities with less than or equal to 2/10 pain and maximal function.  PC        Goals Key:  PC= progressing/continue; PM= partially met;        DC= discontinue    PLAN     Continue Plan of Care to strengthen core and hips allowing her to be able to return to all daily activities with no pain.     12/29/2021: It is my recommendation that patient continue with PT at her current frequency of 2 times per week for the remainder of her approved visits. Her treatment plan will remain the same, and she will be progressed appropriately.     Evelyn Soto, PT

## 2022-01-26 ENCOUNTER — CLINICAL SUPPORT (OUTPATIENT)
Dept: REHABILITATION | Facility: HOSPITAL | Age: 62
End: 2022-01-26
Payer: COMMERCIAL

## 2022-01-26 DIAGNOSIS — R68.89 DECREASED STRENGTH, ENDURANCE, AND MOBILITY: ICD-10-CM

## 2022-01-26 DIAGNOSIS — M47.816 LUMBAR FACET ARTHROPATHY: ICD-10-CM

## 2022-01-26 DIAGNOSIS — G89.29 CHRONIC BILATERAL LOW BACK PAIN WITH BILATERAL SCIATICA: ICD-10-CM

## 2022-01-26 DIAGNOSIS — R53.1 DECREASED STRENGTH, ENDURANCE, AND MOBILITY: ICD-10-CM

## 2022-01-26 DIAGNOSIS — M54.16 LUMBAR RADICULOPATHY: ICD-10-CM

## 2022-01-26 DIAGNOSIS — M54.42 CHRONIC BILATERAL LOW BACK PAIN WITH BILATERAL SCIATICA: ICD-10-CM

## 2022-01-26 DIAGNOSIS — M25.559 HIP PAIN: ICD-10-CM

## 2022-01-26 DIAGNOSIS — M54.41 CHRONIC BILATERAL LOW BACK PAIN WITH BILATERAL SCIATICA: ICD-10-CM

## 2022-01-26 DIAGNOSIS — Z74.09 DECREASED STRENGTH, ENDURANCE, AND MOBILITY: ICD-10-CM

## 2022-01-26 DIAGNOSIS — M47.816 LUMBAR SPONDYLOSIS: ICD-10-CM

## 2022-01-26 PROCEDURE — 97112 NEUROMUSCULAR REEDUCATION: CPT | Performed by: PHYSICAL THERAPIST

## 2022-01-26 NOTE — PROGRESS NOTES
[facility]   Physical Therapy Daily Treatment Note     Name: Shahzad Redd [facility]  Clinic Number: [id number]    Therapy Diagnosis:   Encounter Diagnoses   Name Primary?    Lumbar spondylosis     Lumbar radiculopathy     Hip pain     Lumbar facet arthropathy     Decreased strength, endurance, and mobility     Chronic bilateral low back pain with bilateral sciatica      Physician: Colby Liu MD    Visit Date: 1/26/2022      Physician Orders: PT Eval and Treat  Medical Diagnosis from Referral: lumbar spondylosis, lumbar facet arthropathy  Evaluation Date: 11/26/2021  Authorization Period Expiration: 6/1/2022  Plan of Care Expiration: 2/24/2022  Progress Note Due: 1/28/2022  Visit # / Visits authorized: 6/20 (9+1 evaluation)  FOTO: 2/ 3      Precautions: Standard    PTA Visit #: 0/5     Time In: 1653  Time Out: 1735  Total Billable Time: 40 minutes (late arrival)    SUBJECTIVE     Patient reports: that she felt okay following last visit. She states that the past few days she has felt stiff from not moving as much while sitting at work. She states that she won't move if she doesn't set herself a reminder, and she then feels stiffness and soreness.     She was compliant with home exercise program due to being too busy.    Response to previous treatment: muscle soreness after last session that lasted about 1/2 day.     Functional change: increased standing tolerance, able to stand and kneel in [employer] without issue, able to get in and out of bed with less difficulty. Minimal loss of balance with house hold distances.     Pain: 3/10     Location: left great toe     OBJECTIVE     Objective Measures updated at progress report unless specified.       TREATMENT       MANUAL THERAPY TECHNIQUES were applied for 0 minutes, including:    Manual Intervention Performed Today    Soft Tissue Mobilization   STM Right lateral hip and piriformis muscle   Joint Mobilizations  Lumbar manual traction   "  Mobilization with movement     Long leg distraction  Bilateral x 2 minutes   Functional Dry Needling        Plan for Next Visit:          THERAPEUTIC EXERCISES to develop strength, endurance, ROM, flexibility, posture and core stabilization for 0 minutes including:    Intervention Performed Today    Exercise bike (try upright bike or shuttle next visit)  5 minutes level 3 for endurance   Transverse abdominis contraction  3 minute     Glut sets  3 minutes    Posterior pelvic tilt  3 minutes   Supine marching with abdominal brace  10 x 3    Supine abdominal brace with bilateral hip flexion   10 x    Supine hip abduction with belt  3 minutes   Sitting hip adduction with ball   3 minutes   Bridges with abdominal brace  3 x 10 red band at knees arms folded   Piriformis stretch supine   3 x 30 seconds   Hamstring stretch  3 x 30 seconds   clams  2 x 10 Bilateral  Red band    SAQ     Straight leg raise with abdominal brace  3 x 8 (added straight arm pull down red band)   Single knee to chest  5x, 10" holds each side   Sitting lumbar flexion  10 x 10 seconds     Plan for Next Visit:        NEUROMUSCULAR RE-EDUCATION ACTIVITIES to improve Balance, Coordination, Kinesthetic, Sense, Proprioception and Posture for 40 minutes.  The following were included:     Intervention Performed Today    Standing March  x 2 1 minute  no hand support   Side stepping  x Down and back on turf along mirror 3x,  red band at ankles   Sit to stands x On chair with airex pad 3 x 8 red band at knees    Step ups X  x 6 inch step 2 x 10 no hands    X 10 lateral 6 inch box   Forward step downs off of step  6" step, 2 x 10   payloff press  2 minutes each direction   Anti rotation walk outs x X 10 each direction red band arms outstretched   1/2 kneeling   X 10 shuoulder flexion, x 5 diagonal upper extremity directions   Table tops  x 2 x 10 5 second hold    Table tops with alternating upper extremity overhead reach  3 x 5   Dead bugs x 2 x 20"  "   Quadruped alt LE x 2 x 10   Suitcase carry x Down and back on turn along two mirrors, 4#   Farmer's March x One arm up, one arm at side, 2# each hand, 10x each LE          Plan for Next Visit:         PATIENT EDUCATION AND HOME EXERCISES     Home Exercises Provided and Patient Education Provided     Education provided: (during session) minutes   Patient educated on biomechanical justification for therapeutic exercise and importance of compliance with HEP in order to improve overall impairments and QOL    Patient was educated on all the above exercise prior/during/after for proper posture, positioning, and execution for safe performance with home exercise program.     Written Home Exercises Provided: yes.  Exercises were reviewed and Shahzad was able to demonstrate them prior to the end of the session.  Shahzad demonstrated fair  understanding of the education provided. See EMR under Patient Instructions for exercises provided during therapy sessions.      ASSESSMENT     Patient tolerated treatment well, and she continues to demonstrate improving LE and core strength. Patient demonstrates much less difficulty with sit to stands as compared to previous visits. She did better today with quadruped alt LE, and she required less cues to maintain proper form. Patient still requires cues to maintain core activation with functional activities such as with suitcase carry and farmer's march. Patient reported minimal discomfort into low back initially with side stepping in turf, but once cued on proper form and to activate core, she no longer reported the pain in the low back. Overall patient continues to progress well towards goals.     Shahzad is progressing well towards her goals.   Pt prognosis is Good.     Pt will continue to benefit from skilled outpatient physical therapy to address the deficits listed in the problem list box on initial evaluation, provide pt/family education and to maximize pt's level of independence in the  home and community environment.     Pt's spiritual, cultural and educational needs considered and pt agreeable to plan of care and goals.       Anticipated Barriers for therapy: co-morbidities, sedentary lifestyle, chronicity of condition and adherence to treatment plan      GOALS:     Short Term Goals:  6 weeks Progress    1. Pain: Pt will demonstrate improved pain by reports of less than or equal to 6/10 worst pain on the verbal rating scale in order to progress toward maximal functional ability and improve QOL. Met   2. Function: Patient will demonstrate improved function as indicated by a functional limitation score of less than or equal to 49 out of 100 on FOTO. Met   3. Mobility: Patient will improve AROM to 50% of stated goals, listed in objective measures above, in order to progress towards independence with functional activities.  Met   4. Strength: Patient will improve strength to 50% of stated goals, listed in objective measures above, in order to progress towards independence with functional activities.  PM   5. Gait: Patient will demonstrate improved gait mechanics in order to improve functional mobility, improve quality of life, and decrease risk of further injury or fall.  Met   6. HEP: Patient will demonstrate independence with HEP in order to progress toward functional independence. Met   7. Improve postural awareness.  Met      Long Term Goals:  12 weeks Progress   1. Pain: Pt will demonstrate improved pain by reports of less than or equal to 4/10 worst pain on the verbal rating scale in order to progress toward maximal functional ability and improve QOL.   Met   2. Function: Patient will demonstrate improved function as indicated by a functional limitation score of less than or equal to 42 out of 100 on FOTO. PC   3. Mobility: Patient will improve AROM to stated goals, listed in objective measures above, in order to return to maximal functional potential and improve quality of life. PM    4. Strength: Patient will improve strength to stated goals, listed in objective measures above, in order to improve functional independence and quality of life. PM   5. Gait: Patient will demonstrate normalized gait mechanics with minimal compensation in order to return to PLOF. PM   6. Patient will return to normal ADL's, IADL's, community involvement, recreational activities, and work-related activities with less than or equal to 2/10 pain and maximal function.  PC        Goals Key:  PC= progressing/continue; PM= partially met;        DC= discontinue    PLAN     Continue Plan of Care to strengthen core and hips allowing her to be able to return to all daily activities with no pain.     12/29/2021: It is my recommendation that patient continue with PT at her current frequency of 2 times per week for the remainder of her approved visits. Her treatment plan will remain the same, and she will be progressed appropriately.     Evelyn Soto, PT

## 2022-02-03 DIAGNOSIS — I10 HYPERTENSION, ESSENTIAL: ICD-10-CM

## 2022-02-04 ENCOUNTER — CLINICAL SUPPORT (OUTPATIENT)
Dept: REHABILITATION | Facility: HOSPITAL | Age: 62
End: 2022-02-04
Payer: COMMERCIAL

## 2022-02-04 DIAGNOSIS — Z74.09 DECREASED STRENGTH, ENDURANCE, AND MOBILITY: ICD-10-CM

## 2022-02-04 DIAGNOSIS — R68.89 DECREASED STRENGTH, ENDURANCE, AND MOBILITY: ICD-10-CM

## 2022-02-04 DIAGNOSIS — M47.816 LUMBAR FACET ARTHROPATHY: ICD-10-CM

## 2022-02-04 DIAGNOSIS — M54.41 CHRONIC BILATERAL LOW BACK PAIN WITH BILATERAL SCIATICA: ICD-10-CM

## 2022-02-04 DIAGNOSIS — M54.42 CHRONIC BILATERAL LOW BACK PAIN WITH BILATERAL SCIATICA: ICD-10-CM

## 2022-02-04 DIAGNOSIS — M47.816 LUMBAR SPONDYLOSIS: ICD-10-CM

## 2022-02-04 DIAGNOSIS — G89.29 CHRONIC BILATERAL LOW BACK PAIN WITH BILATERAL SCIATICA: ICD-10-CM

## 2022-02-04 DIAGNOSIS — R53.1 DECREASED STRENGTH, ENDURANCE, AND MOBILITY: ICD-10-CM

## 2022-02-04 DIAGNOSIS — M25.559 HIP PAIN: ICD-10-CM

## 2022-02-04 PROCEDURE — 97110 THERAPEUTIC EXERCISES: CPT | Performed by: PHYSICAL THERAPIST

## 2022-02-04 NOTE — PROGRESS NOTES
[facility]   Physical Therapy Daily Treatment Note + Progress Note + Plan of Care Update    [facility]  [facility] [id number]    Therapy Diagnosis:   Encounter Diagnoses   Name Primary?    Hip pain     Lumbar spondylosis     Lumbar facet arthropathy     Decreased strength, endurance, and mobility     Chronic bilateral low back pain with bilateral sciatica      Physician: Colby Liu MD    Visit Date: 2/4/2022      Physician Orders: PT Eval and Treat  Medical Diagnosis from Referral: lumbar spondylosis, lumbar facet arthropathy  Evaluation Date: 11/26/2021  Authorization Period Expiration: 6/1/2022  Plan of Care Expiration: 5/5/2022  Progress Note Due: 3/6/2022  Visit # / Visits authorized: 7/20 (9+1 evaluation)  FOTO: 2/ 3      Precautions: Standard    PTA Visit #: 0/5     Time In: 0920  Time Out: 1000  Total Billable Time: 40 minutes     SUBJECTIVE     Patient reports: that she is feeling okay today, just stiff. She got caught working yesterday and did not move as much. Patient feels PT is still helping with her functional strength and mobility, and she would like to continue with additional visits.     She was compliant with home exercise program due to being too busy.    Response to previous treatment: muscle soreness after last session that lasted about 1/2 day.   Functional change: increased standing tolerance, able to stand and kneel in [employer] without issue, able to get in and out of bed with less difficulty. Minimal loss of balance with house hold distances.     Pain: 3/10     Location: left great toe     OBJECTIVE     Objective Measures updated at progress report unless specified.     RANGE OF MOTION:     Lumbar Right  (spine) Left    Pain/Dysfunction with Movement Goal   Lumbar Flexion  44  (30 initial) ---   60   Lumbar Extension 18  (10 initial) --- relieves pain 15   Lumbar Side Bending 19  (15 initial) 20  (13 initial) Did not cause radicular  numbness today with SB R 20         STRENGTH:        L/E MMT Right  (spine) Left Pain/Dysfunction with Movement Right  2/4/2022 Left  2/4/2022  Goal   Modified (90/90) Abdominal Strength  Below average ---  Below average but improved --- average   Hip Flexion  4-/5 4-/5  Minimal pain in R hip 4 4+ 4+/5 B   Hip Extension  4-/5 4-/5   4 4+ 4+/5 B   Hip Abduction  4-/5 4-/5   4 4 4+/5 B   Knee Extension 4/5 4-/5   4+ 4+ 5/5 B   Knee Flexion 4/5 4/5   5 5 5/5 B   Ankle DF 4+/5 4+/5   5 5 5/5 B   Ankle PF 4+/5 4+/5   5 5 5/5 B         MUSCLE LENGTH:      Muscle Tested  Right Left  Goal   Hip Flexors decreased decreased Normal B   Quadriceps decreased decreased Normal B   Hamstrings  decreased decreased Normal B   Piriformis  decreased decreased Normal B   Gastrocnemius  decreased decreased Normal B   Soleus  decreased decreased Normal B    **Although still decreased, muscle length has improved as compared to previous visits.      JOINT MOBILITY:      Joint Motion Tested Right  (spine) Left  Goal   Lumbar Spine Normal --- Normal B   Thoracic Spine Mild Hypomobility ---     Hip Joint Hypomobile/Painful Hypomobile Normal B         SPECIAL TESTS:       Right  (spine) Left  Goal   Slump Test Negative  (Postiive on eval) Negative Negative B    SLR Test Negative Negative Negative B    SUZY Test Positive Positive Negative B    Spring Test -lumbar spine Positive --- Negative B    Quadrant Test Positive Negative Negative B    Scour Test Positive Negative Negative B         Sensation:  Sensation is impaired to light touch : patient reports decreased light touch sensation along R LE grossly as compared to the L LE.      Palpation: Increased tone and tenderness noted with palpation of bilateral lumbar paraspinals, quadratus lumborum, and glutes, but decreased as compared to previous visits.      Posture:  Pt presents with postural abnormalities which include: forward head, rounded shoulders  and decreased lumbar lordosis     Gait  "Analysis: The patient ambulated with the following assistive device: none; the pt presents with the following gait abnormalities: bradykinetic, decreased step length bilateral, decreased hip extension bilateral and decreased bilatearl hip flexion (R>L)  2/4/2022: Patient presents ambulating with improved step length and daquan. Her R hip flexion and extension are still limited, but her L hip motion during gait has improved.         Movement Analysis Observations noted   Sit to stands Significantly improved form and strength. Only requires UE assistance on very low surfaces                           FUNCTION:       TREATMENT       MANUAL THERAPY TECHNIQUES were applied for 0 minutes, including:    Manual Intervention Performed Today    Soft Tissue Mobilization   STM Right lateral hip and piriformis muscle   Joint Mobilizations  Lumbar manual traction    Mobilization with movement     Long leg distraction  Bilateral x 2 minutes   Functional Dry Needling        Plan for Next Visit:          THERAPEUTIC EXERCISES to develop strength, endurance, ROM, flexibility, posture and core stabilization for 0 minutes including:    Intervention Performed Today    Exercise bike (try upright bike or shuttle next visit)  5 minutes level 3 for endurance   Transverse abdominis contraction  3 minute     Glut sets  3 minutes    Posterior pelvic tilt  3 minutes   Supine marching with abdominal brace  10 x 3    Supine abdominal brace with bilateral hip flexion   10 x    Supine hip abduction with belt  3 minutes   Sitting hip adduction with ball   3 minutes   Bridges with abdominal brace  3 x 10 red band at knees arms folded   Piriformis stretch supine   3 x 30 seconds   Hamstring stretch  3 x 30 seconds   clams  2 x 10 Bilateral  Red band    SAQ     Straight leg raise with abdominal brace  3 x 8 (added straight arm pull down red band)   Single knee to chest  5x, 10" holds each side   Sitting lumbar flexion  10 x 10 seconds     Plan for " "Next Visit:        NEUROMUSCULAR RE-EDUCATION ACTIVITIES to improve Balance, Coordination, Kinesthetic, Sense, Proprioception and Posture for 40 minutes.  The following were included:     Intervention Performed Today    Standing March  x 2 1 minute  no hand support   Side stepping  x Down and back on turf along mirror 3x,  red band at ankles   Sit to stands x On chair with airex pad 3 x 8 red band at knees    Step ups X  x 6 inch step 2 x 10 no hands    X 10 lateral 6 inch box   Forward step downs off of step  6" step, 2 x 10   payloff press  2 minutes each direction   Anti rotation walk outs x X 10 each direction red band arms outstretched   1/2 kneeling   X 10 shuoulder flexion, x 5 diagonal upper extremity directions   Table tops  x 2 x 10 5 second hold    Table tops with alternating upper extremity overhead reach  3 x 5   Dead bugs x 2 x 20"    Quadruped alt LE x 2 x 10   Suitcase carry x Down and back on turn along two mirrors, 5#   Farmer's March x One arm up, one arm at side, 2# each hand, 10x each LE          Plan for Next Visit:         PATIENT EDUCATION AND HOME EXERCISES     Home Exercises Provided and Patient Education Provided     Education provided: (during session) minutes   Patient educated on biomechanical justification for therapeutic exercise and importance of compliance with HEP in order to improve overall impairments and QOL    Patient was educated on all the above exercise prior/during/after for proper posture, positioning, and execution for safe performance with home exercise program.     Written Home Exercises Provided: yes.  Exercises were reviewed and Shahazd was able to demonstrate them prior to the end of the session.  Shahzad demonstrated good  understanding of the education provided. See EMR under Patient Instructions for exercises provided during therapy sessions.      ASSESSMENT     Patient tolerated treatment well and has attended 17 total PT visits. She has made good overall progress " with PT, demonstrating improvements in lumbar ROM, LE strength, and core stabilization. She demonstrates much less difficulty with sit to stands, and she no longer requires UE assistance unless it is a very low surface. Improving core stabilization noted with quadruped alt LE, as patient demonstrates less rotation of hips and less cues from PT to maintain proper form with activity. Patient does still requires verbal cues from PT to activate core with functional activities such as suitcase carry and farmers march, but she is demonstrating better awareness each visit. Patient would benefit from continued PT in order to address remaining limitations and to reach max functional potential.       Shahzad is progressing well towards her goals.   Pt prognosis is Good.     Pt will continue to benefit from skilled outpatient physical therapy to address the deficits listed in the problem list box on initial evaluation, provide pt/family education and to maximize pt's level of independence in the home and community environment.     Pt's spiritual, cultural and educational needs considered and pt agreeable to plan of care and goals.       Anticipated Barriers for therapy: co-morbidities, sedentary lifestyle, chronicity of condition and adherence to treatment plan      GOALS:     Short Term Goals:  6 weeks Progress    1. Pain: Pt will demonstrate improved pain by reports of less than or equal to 6/10 worst pain on the verbal rating scale in order to progress toward maximal functional ability and improve QOL. Met   2. Function: Patient will demonstrate improved function as indicated by a functional limitation score of less than or equal to 49 out of 100 on FOTO. Met   3. Mobility: Patient will improve AROM to 50% of stated goals, listed in objective measures above, in order to progress towards independence with functional activities.  Met   4. Strength: Patient will improve strength to 50% of stated goals, listed in objective  measures above, in order to progress towards independence with functional activities.  Met   5. Gait: Patient will demonstrate improved gait mechanics in order to improve functional mobility, improve quality of life, and decrease risk of further injury or fall.  Met   6. HEP: Patient will demonstrate independence with HEP in order to progress toward functional independence. Met   7. Improve postural awareness.  Met      Long Term Goals:  12 weeks Progress   1. Pain: Pt will demonstrate improved pain by reports of less than or equal to 4/10 worst pain on the verbal rating scale in order to progress toward maximal functional ability and improve QOL.   Met   2. Function: Patient will demonstrate improved function as indicated by a functional limitation score of less than or equal to 42 out of 100 on FOTO. Met   3. Mobility: Patient will improve AROM to stated goals, listed in objective measures above, in order to return to maximal functional potential and improve quality of life. PM   4. Strength: Patient will improve strength to stated goals, listed in objective measures above, in order to improve functional independence and quality of life. PM   5. Gait: Patient will demonstrate normalized gait mechanics with minimal compensation in order to return to PLOF. PM   6. Patient will return to normal ADL's, IADL's, community involvement, recreational activities, and work-related activities with less than or equal to 2/10 pain and maximal function.  PM        Goals Key:  PC= progressing/continue; PM= partially met;        DC= discontinue    PLAN     Continue Plan of Care to strengthen core and hips allowing her to be able to return to all daily activities with no pain.     Updated Certification Period: 2/4/2022 to 5/5/2022  Recommended Treatment Plan: 2 times per week for 10 more visits: to include any combination of the following interventions: virtual visits, dry needling, modalities, electrical stimulation (IFC, Pre-Mod,  Attended with Functional Dry Needling), Cervical/Lumbar Traction, Gait Training, Manual Therapy, Neuromuscular Re-ed, Patient Education, Self Care, Therapeutic Activites and Therapeutic Exercise     12/29/2021: It is my recommendation that patient continue with PT at her current frequency of 2 times per week for the remainder of her approved visits. Her treatment plan will remain the same, and she will be progressed appropriately.     Evelyn Soto, PT

## 2022-02-07 ENCOUNTER — CLINICAL SUPPORT (OUTPATIENT)
Dept: REHABILITATION | Facility: HOSPITAL | Age: 62
End: 2022-02-07
Payer: COMMERCIAL

## 2022-02-07 DIAGNOSIS — M47.816 LUMBAR SPONDYLOSIS: ICD-10-CM

## 2022-02-07 DIAGNOSIS — R68.89 DECREASED STRENGTH, ENDURANCE, AND MOBILITY: ICD-10-CM

## 2022-02-07 DIAGNOSIS — G89.29 CHRONIC BILATERAL LOW BACK PAIN WITH BILATERAL SCIATICA: ICD-10-CM

## 2022-02-07 DIAGNOSIS — R53.1 DECREASED STRENGTH, ENDURANCE, AND MOBILITY: ICD-10-CM

## 2022-02-07 DIAGNOSIS — M54.41 CHRONIC BILATERAL LOW BACK PAIN WITH BILATERAL SCIATICA: ICD-10-CM

## 2022-02-07 DIAGNOSIS — M47.816 LUMBAR FACET ARTHROPATHY: ICD-10-CM

## 2022-02-07 DIAGNOSIS — M25.559 HIP PAIN: ICD-10-CM

## 2022-02-07 DIAGNOSIS — Z74.09 DECREASED STRENGTH, ENDURANCE, AND MOBILITY: ICD-10-CM

## 2022-02-07 DIAGNOSIS — M54.42 CHRONIC BILATERAL LOW BACK PAIN WITH BILATERAL SCIATICA: ICD-10-CM

## 2022-02-07 PROCEDURE — 97112 NEUROMUSCULAR REEDUCATION: CPT | Mod: CQ

## 2022-02-07 PROCEDURE — 97110 THERAPEUTIC EXERCISES: CPT | Mod: CQ

## 2022-02-07 NOTE — PROGRESS NOTES
[facility]   Physical Therapy Daily Treatment Note     Name: Shahzad [facility]  Clinic Number: [id number]    Therapy Diagnosis:   Encounter Diagnoses   Name Primary?    Hip pain     Lumbar spondylosis     Lumbar facet arthropathy     Decreased strength, endurance, and mobility     Chronic bilateral low back pain with bilateral sciatica      Physician: Colby Liu MD    Visit Date: 2/7/2022      Physician Orders: PT Eval and Treat  Medical Diagnosis from Referral: lumbar spondylosis, lumbar facet arthropathy  Evaluation Date: 11/26/2021  Authorization Period Expiration: 6/1/2022  Plan of Care Expiration: 2/24/2022  Progress Note Due: 1/28/2022  Visit # / Visits authorized: 8/20 (9+1 evaluation)  FOTO: 2/ 3      Precautions: Standard    PTA Visit #: 1/5     Time In: 1557  Time Out: 1633  Total Billable Time: 30 minutes (late arrival)    SUBJECTIVE     Patient reports: that she is feeling okay today. Minimal tingling in her right foot. Stiff upon awakening.     She was not compliant with home exercise program over the weekend.     Response to previous treatment: muscle soreness after last session that lasted about 1/2 day.     Functional change: increased standing tolerance, able to stand and kneel in [employer] without issue, able to get in and out of bed with less difficulty. Less loss of balance with house hold distances.     Pain: 0/10     Location: left great toe (no pain today)    OBJECTIVE     Objective Measures updated at progress report unless specified.     TREATMENT       MANUAL THERAPY TECHNIQUES were applied for 0 minutes, including:    Manual Intervention Performed Today    Soft Tissue Mobilization   STM Right lateral hip and piriformis muscle   Joint Mobilizations  Lumbar manual traction    Mobilization with movement     Long leg distraction  Bilateral x 2 minutes   Functional Dry Needling        Plan for Next Visit:      THERAPEUTIC EXERCISES to develop strength,  "endurance, ROM, flexibility, posture and core stabilization for 11 minutes including:    Intervention Performed Today    Exercise bike (try upright bike or shuttle next visit) x 5 minutes level 3 for endurance   Transverse abdominis contraction  3 minute     Glut sets  3 minutes    Posterior pelvic tilt  3 minutes   Supine marching with abdominal brace  10 x 3    Supine abdominal brace with bilateral hip flexion   10 x    Supine hip abduction with belt  3 minutes   Sitting hip adduction with ball   3 minutes   Bridges with abdominal brace x 3 x 10 on blue swiss ball with arms folded hold 3 seconds (progressed)   Piriformis stretch supine   3 x 30 seconds   Hamstring stretch  3 x 30 seconds   clams  2 x 10 Bilateral  Red band    SAQ     Straight leg raise with abdominal brace x 3 x 10 (added straight arm pull down red band)        Single knee to chest  5x, 10" holds each side   Sitting lumbar flexion  10 x 10 seconds     Plan for Next Visit:        NEUROMUSCULAR RE-EDUCATION ACTIVITIES to improve Balance, Coordination, Kinesthetic, Sense, Proprioception and Posture for 21 minutes.  The following were included:     Intervention Performed Today    Standing March  x 2 1 minute  no hand support   Side stepping   Down and back on turf along mirror 3x,  red band at ankles   Sit to stands  On chair with airex pad 3 x 8 red band at knees    Step ups    6 inch step 2 x 10 no hands    X 10 lateral 6 inch box   Forward step downs off of step  6" step, 2 x 10   payloff press  2 minutes each direction   Anti rotation walk outs x X 15 each direction 20 pounds arms outstretched   1/2 kneeling  x X 20 shuoulder flexion, x 10 diagonal upper extremity directions (increased)   Table tops   2 x 10 5 second hold    Table tops with alternating upper extremity overhead reach x 3 x 5   Dead bugs  2 x 20 seconds   Quadruped alt LE  2 x 10   Suitcase carry  Down and back on turn along two mirrors, 5#   Farmer's March  One arm up, one arm at " side, 2# each hand, 10x each LE          Plan for Next Visit:         PATIENT EDUCATION AND HOME EXERCISES     Home Exercises Provided and Patient Education Provided     Education provided: (during session) minutes   Patient educated on biomechanical justification for therapeutic exercise and importance of compliance with HEP in order to improve overall impairments and QOL    Patient was educated on all the above exercise prior/during/after for proper posture, positioning, and execution for safe performance with home exercise program.     Written Home Exercises Provided: yes.  Exercises were reviewed and Shahzad was able to demonstrate them prior to the end of the session.  Shahzad demonstrated fair  understanding of the education provided. See EMR under Patient Instructions for exercises provided during therapy sessions.      ASSESSMENT     Patient tolerated treatment well, and she continues to demonstrate improving Lower Extremity and core strength. Patient still requires cues to maintain core activation with 1/2 kneeling activities. Patient reported fatigue with that last set of bridges on swiss ball and last set of straight leg raise.      Shahzad is progressing well towards her goals.   Pt prognosis is Good.     Pt will continue to benefit from skilled outpatient physical therapy to address the deficits listed in the problem list box on initial evaluation, provide pt/family education and to maximize pt's level of independence in the home and community environment.     Pt's spiritual, cultural and educational needs considered and pt agreeable to plan of care and goals.       Anticipated Barriers for therapy: co-morbidities, sedentary lifestyle, chronicity of condition and adherence to treatment plan      GOALS:     Short Term Goals:  6 weeks Progress    1. Pain: Pt will demonstrate improved pain by reports of less than or equal to 6/10 worst pain on the verbal rating scale in order to progress toward maximal functional  ability and improve QOL. Met   2. Function: Patient will demonstrate improved function as indicated by a functional limitation score of less than or equal to 49 out of 100 on FOTO. Met   3. Mobility: Patient will improve AROM to 50% of stated goals, listed in objective measures above, in order to progress towards independence with functional activities.  Met   4. Strength: Patient will improve strength to 50% of stated goals, listed in objective measures above, in order to progress towards independence with functional activities.  PM   5. Gait: Patient will demonstrate improved gait mechanics in order to improve functional mobility, improve quality of life, and decrease risk of further injury or fall.  Met   6. HEP: Patient will demonstrate independence with HEP in order to progress toward functional independence. Met   7. Improve postural awareness.  Met      Long Term Goals:  12 weeks Progress   1. Pain: Pt will demonstrate improved pain by reports of less than or equal to 4/10 worst pain on the verbal rating scale in order to progress toward maximal functional ability and improve QOL.   Met   2. Function: Patient will demonstrate improved function as indicated by a functional limitation score of less than or equal to 42 out of 100 on FOTO. PC   3. Mobility: Patient will improve AROM to stated goals, listed in objective measures above, in order to return to maximal functional potential and improve quality of life. PM   4. Strength: Patient will improve strength to stated goals, listed in objective measures above, in order to improve functional independence and quality of life. PM   5. Gait: Patient will demonstrate normalized gait mechanics with minimal compensation in order to return to PLOF. PM   6. Patient will return to normal ADL's, IADL's, community involvement, recreational activities, and work-related activities with less than or equal to 2/10 pain and maximal function.  PC        Goals Key:  PC=  progressing/continue; PM= partially met;        DC= discontinue    PLAN     Continue Plan of Care to strengthen core and hips allowing her to be able to return to all daily activities with no pain.     12/29/2021: It is my recommendation that patient continue with PT at her current frequency of 2 times per week for the remainder of her approved visits. Her treatment plan will remain the same, and she will be progressed appropriately.     Gaetano Baptiste, PTA

## 2022-02-10 ENCOUNTER — CLINICAL SUPPORT (OUTPATIENT)
Dept: REHABILITATION | Facility: HOSPITAL | Age: 62
End: 2022-02-10
Payer: COMMERCIAL

## 2022-02-10 DIAGNOSIS — M25.559 HIP PAIN: ICD-10-CM

## 2022-02-10 DIAGNOSIS — M47.816 LUMBAR FACET ARTHROPATHY: ICD-10-CM

## 2022-02-10 DIAGNOSIS — G89.29 CHRONIC BILATERAL LOW BACK PAIN WITH BILATERAL SCIATICA: ICD-10-CM

## 2022-02-10 DIAGNOSIS — Z74.09 DECREASED STRENGTH, ENDURANCE, AND MOBILITY: ICD-10-CM

## 2022-02-10 DIAGNOSIS — R68.89 DECREASED STRENGTH, ENDURANCE, AND MOBILITY: ICD-10-CM

## 2022-02-10 DIAGNOSIS — M47.816 LUMBAR SPONDYLOSIS: ICD-10-CM

## 2022-02-10 DIAGNOSIS — M54.42 CHRONIC BILATERAL LOW BACK PAIN WITH BILATERAL SCIATICA: ICD-10-CM

## 2022-02-10 DIAGNOSIS — M54.41 CHRONIC BILATERAL LOW BACK PAIN WITH BILATERAL SCIATICA: ICD-10-CM

## 2022-02-10 DIAGNOSIS — R53.1 DECREASED STRENGTH, ENDURANCE, AND MOBILITY: ICD-10-CM

## 2022-02-10 PROCEDURE — 97110 THERAPEUTIC EXERCISES: CPT | Mod: CQ

## 2022-02-10 PROCEDURE — 97112 NEUROMUSCULAR REEDUCATION: CPT | Mod: CQ

## 2022-02-10 NOTE — PROGRESS NOTES
[facility]   Physical Therapy Daily Treatment Note     Name: Shahzad Redd [facility]  Clinic Number: [id number]    Therapy Diagnosis:   Encounter Diagnoses   Name Primary?    Hip pain     Lumbar spondylosis     Lumbar facet arthropathy     Decreased strength, endurance, and mobility     Chronic bilateral low back pain with bilateral sciatica      Physician: Colby Liu MD    Visit Date: 2/10/2022      Physician Orders: PT Eval and Treat  Medical Diagnosis from Referral: lumbar spondylosis, lumbar facet arthropathy  Evaluation Date: 11/26/2021  Authorization Period Expiration: 6/1/2022  Plan of Care Expiration: 2/24/2022  Progress Note Due: 1/28/2022  Visit # / Visits authorized: 9/20 (9+1 evaluation)  FOTO: 2/ 3      Precautions: Standard    PTA Visit #: 2/5     Time In: 1558  Time Out: 1630  Total Billable Time: 30 minutes (late arrival)    SUBJECTIVE     Patient reports: that she is so busy with work related activities she has not had time to think about her pain. Upon walking her normal 2 mile walk yesterday she reports her right foot was totally numb. Minimal tingling in her right foot.      She was not compliant with home exercise program over the weekend.     Response to previous treatment: muscle soreness after last session that lasted about 1/2 day.     Functional change: increased standing tolerance, able to stand and kneel in [place] without issue, able to get in and out of bed with less difficulty. Less loss of balance with house hold distances.     Pain: 0/10     Location: left great toe (no pain today)    OBJECTIVE     Objective Measures updated at progress report unless specified.     TREATMENT       MANUAL THERAPY TECHNIQUES were applied for 0 minutes, including:    Manual Intervention Performed Today    Soft Tissue Mobilization   STM Right lateral hip and piriformis muscle   Joint Mobilizations  Lumbar manual traction    Mobilization with movement     Long leg  distraction  Bilateral x 2 minutes   Functional Dry Needling        Plan for Next Visit:      THERAPEUTIC EXERCISES to develop strength, endurance, ROM, flexibility, posture and core stabilization for 14 minutes including:    Intervention Performed Today    Exercise bike (try upright bike or shuttle next visit) x 5 minutes level 2 for endurance cues for abdominal bracing   Transverse abdominis contraction  3 minute     Glut sets  3 minutes    Posterior pelvic tilt  3 minutes   Supine marching with abdominal brace  10 x 3    Supine abdominal brace with bilateral hip flexion   10 x    Supine hip abduction with belt  3 minutes   Sitting hip adduction with ball   3 minutes   Bridges with abdominal brace  3 x 10 on blue swiss ball with arms folded hold 3 seconds (progressed)   Piriformis stretch supine   3 x 30 seconds   Hamstring stretch  3 x 30 seconds 90/90 technique   clams  2 x 10 Bilateral  Red band    SAQ     Straight leg raise with abdominal brace  3 x 10 (added straight arm pull down red band)        Single knee to chest X 10 second holds x 10 opposite leg extended    Sitting lumbar flexion x 10 x 10 seconds 3 directions     Plan for Next Visit:        NEUROMUSCULAR RE-EDUCATION ACTIVITIES to improve Balance, Coordination, Kinesthetic, Sense, Proprioception and Posture for 8 minutes.  The following were included:     Intervention Performed Today    Standing March  x 2 1 minute  no hand support   Side stepping   Down and back on turf along mirror 3x,  red band at ankles   Sit to stands with abdominal bracing x On chair with airex pad 2 x 8, x 10 red band at knees    Step ups    6 inch step 2 x 10 no hands    X 10 lateral 6 inch box   Forward step downs off of step x 6 inch step, 2 x 10 bilateral no hands   payloff press  2 minutes each direction   Anti rotation walk outs  X 15 each direction 20 pounds arms outstretched   1/2 kneeling   X 20 shuoulder flexion, x 10 diagonal upper extremity directions (increased)    Table tops   2 x 10 5 second hold    Table tops with alternating upper extremity overhead reach  3 x 5   Dead bugs  2 x 20 seconds   Quadruped alt LE  2 x 10   Suitcase carry  Down and back on turn along two mirrors, 5#   Farmer's March  One arm up, one arm at side, 2# each hand, 10x each LE          Plan for Next Visit:         PATIENT EDUCATION AND HOME EXERCISES     Home Exercises Provided and Patient Education Provided     Education provided: (during session) minutes   Patient educated on biomechanical justification for therapeutic exercise and importance of compliance with HEP in order to improve overall impairments and QOL    Patient was educated on all the above exercise prior/during/after for proper posture, positioning, and execution for safe performance with home exercise program.     Written Home Exercises Provided: yes.  Exercises were reviewed and Shahzad was able to demonstrate them prior to the end of the session.  Shahzad demonstrated fair  understanding of the education provided. See EMR under Patient Instructions for exercises provided during therapy sessions.      ASSESSMENT     Patient tolerated treatment with good quality and she continues to demonstrate improving Lower Extremity and core strength. Patient was able to perform additional reps with sit<-->stand without complaints.       Shahzad is progressing well towards her goals.   Pt prognosis is Good.     Pt will continue to benefit from skilled outpatient physical therapy to address the deficits listed in the problem list box on initial evaluation, provide pt/family education and to maximize pt's level of independence in the home and community environment.     Pt's spiritual, cultural and educational needs considered and pt agreeable to plan of care and goals.       Anticipated Barriers for therapy: co-morbidities, sedentary lifestyle, chronicity of condition and adherence to treatment plan      GOALS:     Short Term Goals:  6 weeks Progress     1. Pain: Pt will demonstrate improved pain by reports of less than or equal to 6/10 worst pain on the verbal rating scale in order to progress toward maximal functional ability and improve QOL. Met   2. Function: Patient will demonstrate improved function as indicated by a functional limitation score of less than or equal to 49 out of 100 on FOTO. Met   3. Mobility: Patient will improve AROM to 50% of stated goals, listed in objective measures above, in order to progress towards independence with functional activities.  Met   4. Strength: Patient will improve strength to 50% of stated goals, listed in objective measures above, in order to progress towards independence with functional activities.  PM   5. Gait: Patient will demonstrate improved gait mechanics in order to improve functional mobility, improve quality of life, and decrease risk of further injury or fall.  Met   6. HEP: Patient will demonstrate independence with HEP in order to progress toward functional independence. Met   7. Improve postural awareness.  Met      Long Term Goals:  12 weeks Progress   1. Pain: Pt will demonstrate improved pain by reports of less than or equal to 4/10 worst pain on the verbal rating scale in order to progress toward maximal functional ability and improve QOL.   Met   2. Function: Patient will demonstrate improved function as indicated by a functional limitation score of less than or equal to 42 out of 100 on FOTO. PC   3. Mobility: Patient will improve AROM to stated goals, listed in objective measures above, in order to return to maximal functional potential and improve quality of life. PM   4. Strength: Patient will improve strength to stated goals, listed in objective measures above, in order to improve functional independence and quality of life. PM   5. Gait: Patient will demonstrate normalized gait mechanics with minimal compensation in order to return to PLOF. PM   6. Patient will return to normal ADL's, IADL's,  community involvement, recreational activities, and work-related activities with less than or equal to 2/10 pain and maximal function.  PC        Goals Key:  PC= progressing/continue; PM= partially met;        DC= discontinue    PLAN     Continue Plan of Care to strengthen core and hips allowing her to be able to return to all daily activities with no pain.     12/29/2021: It is my recommendation that patient continue with PT at her current frequency of 2 times per week for the remainder of her approved visits. Her treatment plan will remain the same, and she will be progressed appropriately.     Gaetano Baptiste, PTA

## 2022-02-11 ENCOUNTER — DOCUMENTATION ONLY (OUTPATIENT)
Dept: REHABILITATION | Facility: HOSPITAL | Age: 62
End: 2022-02-11
Payer: COMMERCIAL

## 2022-02-11 NOTE — PLAN OF CARE
[facility]   Physical Therapy Daily Treatment Note + Progress Note + Plan of Care Update    [facility]  [facility] [id number]    Therapy Diagnosis:   Encounter Diagnoses   Name Primary?    Hip pain     Lumbar spondylosis     Lumbar facet arthropathy     Decreased strength, endurance, and mobility     Chronic bilateral low back pain with bilateral sciatica      Physician: Colby Liu MD    Visit Date: 2/4/2022      Physician Orders: PT Eval and Treat  Medical Diagnosis from Referral: lumbar spondylosis, lumbar facet arthropathy  Evaluation Date: 11/26/2021  Authorization Period Expiration: 6/1/2022  Plan of Care Expiration: 5/5/2022  Progress Note Due: 3/6/2022  Visit # / Visits authorized: 7/20 (9+1 evaluation)  FOTO: 2/ 3      Precautions: Standard    PTA Visit #: 0/5     Time In: 0920  Time Out: 1000  Total Billable Time: 40 minutes     SUBJECTIVE     Patient reports: that she is feeling okay today, just stiff. She got caught working yesterday and did not move as much. Patient feels PT is still helping with her functional strength and mobility, and she would like to continue with additional visits.     She was compliant with home exercise program due to being too busy.    Response to previous treatment: muscle soreness after last session that lasted about 1/2 day.   Functional change: increased standing tolerance, able to stand and kneel in [place] without issue, able to get in and out of bed with less difficulty. Minimal loss of balance with house hold distances.     Pain: 3/10     Location: left great toe     OBJECTIVE     Objective Measures updated at progress report unless specified.     RANGE OF MOTION:     Lumbar Right  (spine) Left    Pain/Dysfunction with Movement Goal   Lumbar Flexion  44  (30 initial) ---   60   Lumbar Extension 18  (10 initial) --- relieves pain 15   Lumbar Side Bending 19  (15 initial) 20  (13 initial) Did not cause radicular  numbness today with SB R 20         STRENGTH:        L/E MMT Right  (spine) Left Pain/Dysfunction with Movement Right  2/4/2022 Left  2/4/2022  Goal   Modified (90/90) Abdominal Strength  Below average ---  Below average but improved --- average   Hip Flexion  4-/5 4-/5  Minimal pain in R hip 4 4+ 4+/5 B   Hip Extension  4-/5 4-/5   4 4+ 4+/5 B   Hip Abduction  4-/5 4-/5   4 4 4+/5 B   Knee Extension 4/5 4-/5   4+ 4+ 5/5 B   Knee Flexion 4/5 4/5   5 5 5/5 B   Ankle DF 4+/5 4+/5   5 5 5/5 B   Ankle PF 4+/5 4+/5   5 5 5/5 B         MUSCLE LENGTH:      Muscle Tested  Right Left  Goal   Hip Flexors decreased decreased Normal B   Quadriceps decreased decreased Normal B   Hamstrings  decreased decreased Normal B   Piriformis  decreased decreased Normal B   Gastrocnemius  decreased decreased Normal B   Soleus  decreased decreased Normal B    **Although still decreased, muscle length has improved as compared to previous visits.      JOINT MOBILITY:      Joint Motion Tested Right  (spine) Left  Goal   Lumbar Spine Normal --- Normal B   Thoracic Spine Mild Hypomobility ---     Hip Joint Hypomobile/Painful Hypomobile Normal B         SPECIAL TESTS:       Right  (spine) Left  Goal   Slump Test Negative  (Postiive on eval) Negative Negative B    SLR Test Negative Negative Negative B    SUZY Test Positive Positive Negative B    Spring Test -lumbar spine Positive --- Negative B    Quadrant Test Positive Negative Negative B    Scour Test Positive Negative Negative B         Sensation:  Sensation is impaired to light touch : patient reports decreased light touch sensation along R LE grossly as compared to the L LE.      Palpation: Increased tone and tenderness noted with palpation of bilateral lumbar paraspinals, quadratus lumborum, and glutes, but decreased as compared to previous visits.      Posture:  Pt presents with postural abnormalities which include: forward head, rounded shoulders  and decreased lumbar lordosis     Gait  "Analysis: The patient ambulated with the following assistive device: none; the pt presents with the following gait abnormalities: bradykinetic, decreased step length bilateral, decreased hip extension bilateral and decreased bilatearl hip flexion (R>L)  2/4/2022: Patient presents ambulating with improved step length and daquan. Her R hip flexion and extension are still limited, but her L hip motion during gait has improved.         Movement Analysis Observations noted   Sit to stands Significantly improved form and strength. Only requires UE assistance on very low surfaces                           FUNCTION:       TREATMENT       MANUAL THERAPY TECHNIQUES were applied for 0 minutes, including:    Manual Intervention Performed Today    Soft Tissue Mobilization   STM Right lateral hip and piriformis muscle   Joint Mobilizations  Lumbar manual traction    Mobilization with movement     Long leg distraction  Bilateral x 2 minutes   Functional Dry Needling        Plan for Next Visit:          THERAPEUTIC EXERCISES to develop strength, endurance, ROM, flexibility, posture and core stabilization for 0 minutes including:    Intervention Performed Today    Exercise bike (try upright bike or shuttle next visit)  5 minutes level 3 for endurance   Transverse abdominis contraction  3 minute     Glut sets  3 minutes    Posterior pelvic tilt  3 minutes   Supine marching with abdominal brace  10 x 3    Supine abdominal brace with bilateral hip flexion   10 x    Supine hip abduction with belt  3 minutes   Sitting hip adduction with ball   3 minutes   Bridges with abdominal brace  3 x 10 red band at knees arms folded   Piriformis stretch supine   3 x 30 seconds   Hamstring stretch  3 x 30 seconds   clams  2 x 10 Bilateral  Red band    SAQ     Straight leg raise with abdominal brace  3 x 8 (added straight arm pull down red band)   Single knee to chest  5x, 10" holds each side   Sitting lumbar flexion  10 x 10 seconds     Plan for " "Next Visit:        NEUROMUSCULAR RE-EDUCATION ACTIVITIES to improve Balance, Coordination, Kinesthetic, Sense, Proprioception and Posture for 40 minutes.  The following were included:     Intervention Performed Today    Standing March  x 2 1 minute  no hand support   Side stepping  x Down and back on turf along mirror 3x,  red band at ankles   Sit to stands x On chair with airex pad 3 x 8 red band at knees    Step ups X  x 6 inch step 2 x 10 no hands    X 10 lateral 6 inch box   Forward step downs off of step  6" step, 2 x 10   payloff press  2 minutes each direction   Anti rotation walk outs x X 10 each direction red band arms outstretched   1/2 kneeling   X 10 shuoulder flexion, x 5 diagonal upper extremity directions   Table tops  x 2 x 10 5 second hold    Table tops with alternating upper extremity overhead reach  3 x 5   Dead bugs x 2 x 20"    Quadruped alt LE x 2 x 10   Suitcase carry x Down and back on turn along two mirrors, 5#   Farmer's March x One arm up, one arm at side, 2# each hand, 10x each LE          Plan for Next Visit:         PATIENT EDUCATION AND HOME EXERCISES     Home Exercises Provided and Patient Education Provided     Education provided: (during session) minutes   Patient educated on biomechanical justification for therapeutic exercise and importance of compliance with HEP in order to improve overall impairments and QOL    Patient was educated on all the above exercise prior/during/after for proper posture, positioning, and execution for safe performance with home exercise program.     Written Home Exercises Provided: yes.  Exercises were reviewed and Shahzad was able to demonstrate them prior to the end of the session.  Shahzad demonstrated good  understanding of the education provided. See EMR under Patient Instructions for exercises provided during therapy sessions.      ASSESSMENT     Patient tolerated treatment well and has attended 17 total PT visits. She has made good overall progress " with PT, demonstrating improvements in lumbar ROM, LE strength, and core stabilization. She demonstrates much less difficulty with sit to stands, and she no longer requires UE assistance unless it is a very low surface. Improving core stabilization noted with quadruped alt LE, as patient demonstrates less rotation of hips and less cues from PT to maintain proper form with activity. Patient does still requires verbal cues from PT to activate core with functional activities such as suitcase carry and farmers march, but she is demonstrating better awareness each visit. Patient would benefit from continued PT in order to address remaining limitations and to reach max functional potential.       Shahzad is progressing well towards her goals.   Pt prognosis is Good.     Pt will continue to benefit from skilled outpatient physical therapy to address the deficits listed in the problem list box on initial evaluation, provide pt/family education and to maximize pt's level of independence in the home and community environment.     Pt's spiritual, cultural and educational needs considered and pt agreeable to plan of care and goals.       Anticipated Barriers for therapy: co-morbidities, sedentary lifestyle, chronicity of condition and adherence to treatment plan      GOALS:     Short Term Goals:  6 weeks Progress    1. Pain: Pt will demonstrate improved pain by reports of less than or equal to 6/10 worst pain on the verbal rating scale in order to progress toward maximal functional ability and improve QOL. Met   2. Function: Patient will demonstrate improved function as indicated by a functional limitation score of less than or equal to 49 out of 100 on FOTO. Met   3. Mobility: Patient will improve AROM to 50% of stated goals, listed in objective measures above, in order to progress towards independence with functional activities.  Met   4. Strength: Patient will improve strength to 50% of stated goals, listed in objective  measures above, in order to progress towards independence with functional activities.  Met   5. Gait: Patient will demonstrate improved gait mechanics in order to improve functional mobility, improve quality of life, and decrease risk of further injury or fall.  Met   6. HEP: Patient will demonstrate independence with HEP in order to progress toward functional independence. Met   7. Improve postural awareness.  Met      Long Term Goals:  12 weeks Progress   1. Pain: Pt will demonstrate improved pain by reports of less than or equal to 4/10 worst pain on the verbal rating scale in order to progress toward maximal functional ability and improve QOL.   Met   2. Function: Patient will demonstrate improved function as indicated by a functional limitation score of less than or equal to 42 out of 100 on FOTO. Met   3. Mobility: Patient will improve AROM to stated goals, listed in objective measures above, in order to return to maximal functional potential and improve quality of life. PM   4. Strength: Patient will improve strength to stated goals, listed in objective measures above, in order to improve functional independence and quality of life. PM   5. Gait: Patient will demonstrate normalized gait mechanics with minimal compensation in order to return to PLOF. PM   6. Patient will return to normal ADL's, IADL's, community involvement, recreational activities, and work-related activities with less than or equal to 2/10 pain and maximal function.  PM        Goals Key:  PC= progressing/continue; PM= partially met;        DC= discontinue    PLAN     Continue Plan of Care to strengthen core and hips allowing her to be able to return to all daily activities with no pain.     Updated Certification Period: 2/4/2022 to 5/5/2022  Recommended Treatment Plan: 2 times per week for 10 more visits: to include any combination of the following interventions: virtual visits, dry needling, modalities, electrical stimulation (IFC, Pre-Mod,  Attended with Functional Dry Needling), Cervical/Lumbar Traction, Gait Training, Manual Therapy, Neuromuscular Re-ed, Patient Education, Self Care, Therapeutic Activites and Therapeutic Exercise     12/29/2021: It is my recommendation that patient continue with PT at her current frequency of 2 times per week for the remainder of her approved visits. Her treatment plan will remain the same, and she will be progressed appropriately.     Evelyn Soto, PT

## 2022-02-15 ENCOUNTER — PATIENT OUTREACH (OUTPATIENT)
Dept: ADMINISTRATIVE | Facility: OTHER | Age: 62
End: 2022-02-15
Payer: COMMERCIAL

## 2022-02-15 ENCOUNTER — CLINICAL SUPPORT (OUTPATIENT)
Dept: REHABILITATION | Facility: HOSPITAL | Age: 62
End: 2022-02-15
Payer: COMMERCIAL

## 2022-02-15 DIAGNOSIS — G89.29 CHRONIC BILATERAL LOW BACK PAIN WITH BILATERAL SCIATICA: ICD-10-CM

## 2022-02-15 DIAGNOSIS — M47.816 LUMBAR SPONDYLOSIS: ICD-10-CM

## 2022-02-15 DIAGNOSIS — R53.1 DECREASED STRENGTH, ENDURANCE, AND MOBILITY: ICD-10-CM

## 2022-02-15 DIAGNOSIS — M54.42 CHRONIC BILATERAL LOW BACK PAIN WITH BILATERAL SCIATICA: ICD-10-CM

## 2022-02-15 DIAGNOSIS — R68.89 DECREASED STRENGTH, ENDURANCE, AND MOBILITY: ICD-10-CM

## 2022-02-15 DIAGNOSIS — Z74.09 DECREASED STRENGTH, ENDURANCE, AND MOBILITY: ICD-10-CM

## 2022-02-15 DIAGNOSIS — M54.41 CHRONIC BILATERAL LOW BACK PAIN WITH BILATERAL SCIATICA: ICD-10-CM

## 2022-02-15 DIAGNOSIS — M25.559 HIP PAIN: ICD-10-CM

## 2022-02-15 DIAGNOSIS — M47.816 LUMBAR FACET ARTHROPATHY: ICD-10-CM

## 2022-02-15 PROCEDURE — 97112 NEUROMUSCULAR REEDUCATION: CPT | Mod: CQ

## 2022-02-15 PROCEDURE — 97110 THERAPEUTIC EXERCISES: CPT | Mod: CQ

## 2022-02-15 NOTE — PROGRESS NOTES
[facility]   Physical Therapy Daily Treatment Note     Name: Shahzad Redd [facility]  Clinic Number: [id number]    Therapy Diagnosis:   Encounter Diagnoses   Name Primary?    Hip pain     Lumbar spondylosis     Lumbar facet arthropathy     Decreased strength, endurance, and mobility     Chronic bilateral low back pain with bilateral sciatica      Physician: Colby Liu MD    Visit Date: 2/15/2022      Physician Orders: PT Eval and Treat  Medical Diagnosis from Referral: lumbar spondylosis, lumbar facet arthropathy  Evaluation Date: 11/26/2021  Authorization Period Expiration: 6/1/2022  Plan of Care Expiration: 5/5/2022  Progress Note Due: 3/6/2022  Visit # / Visits authorized: 10/20 (9+1 evaluation)  FOTO: 2/ 3      Precautions: Standard    PTA Visit #: 3/5     Time In: 1715  Time Out: 1800  Total Billable Time: 29 minutes     SUBJECTIVE     Patient reports: Minimal tingling in her right foot.      She was compliant with home exercise program over the weekend.     Response to previous treatment: muscle soreness after last session that lasted about 1/2 day.     Functional change: increased standing tolerance, able to stand and kneel in Yazidism without issue, able to get in and out of bed with less difficulty. Less loss of balance with house hold distances.     Pain: 2/10     Location: left great toe     OBJECTIVE     Objective Measures updated at progress report unless specified.     TREATMENT       MANUAL THERAPY TECHNIQUES were applied for 0 minutes, including:    Manual Intervention Performed Today    Soft Tissue Mobilization   STM Right lateral hip and piriformis muscle   Joint Mobilizations  Lumbar manual traction    Mobilization with movement     Long leg distraction  Bilateral x 2 minutes   Functional Dry Needling        Plan for Next Visit:      THERAPEUTIC EXERCISES to develop strength, endurance, ROM, flexibility, posture and core stabilization for 5 minutes  including:    Intervention Performed Today    Exercise bike (try upright bike or shuttle next visit)  5 minutes level 2 for endurance cues for abdominal bracing   Elliptical  x 4 minutes level 1 for core endurance   Transverse abdominis contraction  3 minute     Glut sets  3 minutes    Posterior pelvic tilt  3 minutes   Supine marching with abdominal brace  10 x 3    Supine abdominal brace with bilateral hip flexion   10 x    Supine hip abduction with belt  3 minutes   Sitting hip adduction with ball   3 minutes   Bridges with abdominal brace  3 x 10 on blue swiss ball with arms folded hold 3 seconds (progressed)   Piriformis stretch supine   3 x 30 seconds   Hamstring stretch  3 x 30 seconds 90/90 technique   clams  2 x 10 Bilateral  Red band    SAQ     Straight leg raise with abdominal brace  3 x 10 (added straight arm pull down red band)        Single knee to chest  10 second holds x 10 opposite leg extended    Sitting lumbar flexion  10 x 10 seconds 3 directions     Plan for Next Visit:        NEUROMUSCULAR RE-EDUCATION ACTIVITIES to improve Balance, Coordination, Kinesthetic, Sense, Proprioception and Posture for 14 minutes.  The following were included:     Intervention Performed Today    Standing March  x 2 1 minute  no hand support   Side stepping   Down and back on turf along mirror 3x,  red band at ankles   Sit to stands with abdominal bracing  On chair with airex pad 2 x 8, x 10 red band at knees    Step ups    6 inch step 2 x 10 no hands    X 10 lateral 6 inch box   Forward step downs off of step  6 inch step, 2 x 10 bilateral no hands   payloff press  2 minutes each direction   Anti rotation walk outs x X 15 each direction purple cooks cord hui outstretched on truf   1/2 kneeling in tandem kneeling x X 2 minutes each leg shuoulder horizontal abduction (progressed) holding 2 pounds progressing with head turning following weight   Table tops   2 x 10 5 second hold    Table tops with alternating upper  extremity overhead reach  3 x 5   Dead bugs  2 x 20 seconds   Quadruped alt LE  2 x 10   Suitcase carry  Down and back on turn along two mirrors, 5#   Farmer's March  One arm up, one arm at side, 2# each hand, 10x each LE   Prone planks x 10 x 10 seconds knees and elbows   Side lying planks x X 5 10 second hold knees and elbow     Plan for Next Visit:         PATIENT EDUCATION AND HOME EXERCISES     Home Exercises Provided and Patient Education Provided     Education provided: (during session) minutes   Patient educated on biomechanical justification for therapeutic exercise and importance of compliance with HEP in order to improve overall impairments and QOL    Patient was educated on all the above exercise prior/during/after for proper posture, positioning, and execution for safe performance with home exercise program.     Written Home Exercises Provided: yes.  Exercises were reviewed and Shahzad was able to demonstrate them prior to the end of the session.  Shahzad demonstrated fair  understanding of the education provided. See EMR under Patient Instructions for exercises provided during therapy sessions.    ASSESSMENT     Patient tolerated treatment with j]progressions of core activity. She demonstrated increased difficulty with 1/2 kneeling on left lower extremity with rotation to the left. Patient noted to have more difficulty with side lying planks on her left compared to on her right.        Shahzad is progressing well towards her goals.   Pt prognosis is Good.     Pt will continue to benefit from skilled outpatient physical therapy to address the deficits listed in the problem list box on initial evaluation, provide pt/family education and to maximize pt's level of independence in the home and community environment.     Pt's spiritual, cultural and educational needs considered and pt agreeable to plan of care and goals.       Anticipated Barriers for therapy: co-morbidities, sedentary lifestyle, chronicity of  condition and adherence to treatment plan      GOALS:     Short Term Goals:  6 weeks Progress    1. Pain: Pt will demonstrate improved pain by reports of less than or equal to 6/10 worst pain on the verbal rating scale in order to progress toward maximal functional ability and improve QOL. Met   2. Function: Patient will demonstrate improved function as indicated by a functional limitation score of less than or equal to 49 out of 100 on FOTO. Met   3. Mobility: Patient will improve AROM to 50% of stated goals, listed in objective measures above, in order to progress towards independence with functional activities.  Met   4. Strength: Patient will improve strength to 50% of stated goals, listed in objective measures above, in order to progress towards independence with functional activities.  PM   5. Gait: Patient will demonstrate improved gait mechanics in order to improve functional mobility, improve quality of life, and decrease risk of further injury or fall.  Met   6. HEP: Patient will demonstrate independence with HEP in order to progress toward functional independence. Met   7. Improve postural awareness.  Met      Long Term Goals:  12 weeks Progress   1. Pain: Pt will demonstrate improved pain by reports of less than or equal to 4/10 worst pain on the verbal rating scale in order to progress toward maximal functional ability and improve QOL.   Met   2. Function: Patient will demonstrate improved function as indicated by a functional limitation score of less than or equal to 42 out of 100 on FOTO. PC   3. Mobility: Patient will improve AROM to stated goals, listed in objective measures above, in order to return to maximal functional potential and improve quality of life. PM   4. Strength: Patient will improve strength to stated goals, listed in objective measures above, in order to improve functional independence and quality of life. PM   5. Gait: Patient will demonstrate normalized gait mechanics with minimal  compensation in order to return to PLOF. PM   6. Patient will return to normal ADL's, IADL's, community involvement, recreational activities, and work-related activities with less than or equal to 2/10 pain and maximal function.  PC        Goals Key:  PC= progressing/continue; PM= partially met;        DC= discontinue    PLAN     Continue Plan of Care to strengthen core and hips allowing her to be able to return to all daily activities with no pain.     12/29/2021: It is my recommendation that patient continue with PT at her current frequency of 2 times per week for the remainder of her approved visits. Her treatment plan will remain the same, and she will be progressed appropriately.     Gaetano Baptiste, PTA

## 2022-02-15 NOTE — PROGRESS NOTES
Health Maintenance Due   Topic Date Due    Shingles Vaccine (1 of 2) Never done    Mammogram  02/11/2022     Updates were requested from care everywhere.  Chart was reviewed for overdue Proactive [facility] Encounters ([facility]) topics (CRS, Breast Cancer Screening, Eye exam)  Health Maintenance has been updated.  LINKS immunization registry triggered.  Immunizations were reconciled.

## 2022-02-16 ENCOUNTER — OFFICE VISIT (OUTPATIENT)
Dept: ORTHOPEDICS | Facility: CLINIC | Age: 62
End: 2022-02-16
Payer: COMMERCIAL

## 2022-02-16 VITALS
HEIGHT: 72 IN | SYSTOLIC BLOOD PRESSURE: 139 MMHG | DIASTOLIC BLOOD PRESSURE: 74 MMHG | WEIGHT: 186 LBS | HEART RATE: 99 BPM | BODY MASS INDEX: 25.19 KG/M2

## 2022-02-16 DIAGNOSIS — M18.11 DEGENERATIVE ARTHRITIS OF THUMB, RIGHT: Primary | ICD-10-CM

## 2022-02-16 DIAGNOSIS — M79.644 PAIN OF RIGHT THUMB: ICD-10-CM

## 2022-02-16 PROCEDURE — 99203 PR OFFICE/OUTPT VISIT, NEW, LEVL III, 30-44 MIN: ICD-10-PCS | Mod: S$GLB,,, | Performed by: ORTHOPAEDIC SURGERY

## 2022-02-16 PROCEDURE — 99203 OFFICE O/P NEW LOW 30 MIN: CPT | Mod: S$GLB,,, | Performed by: ORTHOPAEDIC SURGERY

## 2022-02-16 PROCEDURE — 99999 PR PBB SHADOW E&M-EST. PATIENT-LVL IV: ICD-10-PCS | Mod: PBBFAC,,, | Performed by: ORTHOPAEDIC SURGERY

## 2022-02-16 PROCEDURE — 99999 PR PBB SHADOW E&M-EST. PATIENT-LVL IV: CPT | Mod: PBBFAC,,, | Performed by: ORTHOPAEDIC SURGERY

## 2022-02-16 NOTE — PROGRESS NOTES
Subjective:     Patient ID: Shahzad Feliz is a 61 y.o. female.    Chief Complaint: Pain of the Right Hand and Pain of the Left Hand      HPI:  The patient is a 61-year-old female with right thumb interphalangeal joint arthritis and associated mucous cyst that is clinically asymptomatic at this point.  There is no nail deformity    Past Medical History:   Diagnosis Date    Bone spur     disc    Bulging disc     Hypertension      Past Surgical History:   Procedure Laterality Date    COLONOSCOPY N/A 7/25/2019    Procedure: COLONOSCOPY;  Surgeon: Domenico Howard MD;  Location: [facility];  Service: Endoscopy;  Laterality: N/A;    COLONOSCOPY W/ BIOPSIES AND POLYPECTOMY  12/17/14    tubular adenoma, hyperplastic polyp, repeat 3 years    HYSTERECTOMY      OVARIAN CYST REMOVAL       No family history on file.  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    ATORVASTATIN (LIPITOR) 10 MG TABLET    Take 1 tablet (10 mg total) by mouth once daily.    B COMPLEX VITAMINS CAPSULE    Take 1 capsule by mouth once daily.    CHOLECALCIFEROL, VITAMIN D3, 1,000 UNIT/SPRAY SPSN    Place 2,000 Units under the tongue.     ESTROGENS, CONJUGATED, (PREMARIN) 0.625 MG TABLET    Take 0.625 mg by mouth once daily.    FISH OIL-OMEGA-3 FATTY ACIDS 300-1,000 MG CAPSULE    Take 1 capsule by mouth once daily.    FLUTICASONE PROPIONATE (FLONASE) 50 MCG/ACTUATION NASAL SPRAY    2 sprays (100 mcg total) by Each Nostril route once daily.    GABAPENTIN (NEURONTIN) 300 MG CAPSULE     Take 1 capsule (300 mg total) by mouth every evening. Take 45 min to 1 hour before bed as may cause drowsiness    GLUTAMINE ORAL    Take by mouth daily as needed (for dyspepsia).    LEVOCETIRIZINE (XYZAL) 5 MG TABLET    Take 1 tablet (5 mg total) by mouth once daily.    MULTIVITAMIN (THERAGRAN) PER TABLET    Take 1 tablet by mouth once daily.    MUPIROCIN (BACTROBAN) 2 % OINTMENT    APPLY EXTERNALLY TO THE AFFECTED AREA THREE TIMES DAILY    NAPROXEN (NAPROSYN) 500 MG TABLET    Take 1 tablet (500 mg total) by mouth 2 (two) times daily. As needed.    NIACIN (SLO-NIACIN) 500 MG TABLET    Take 500 mg by mouth 2 (two) times daily with meals.    PANTOPRAZOLE (PROTONIX) 40 MG TABLET    Take 1 tablet by mouth every morning.    TIZANIDINE (ZANAFLEX) 4 MG TABLET    TAKE 1 TABLET BY MOUTH TWICE A DAY AS NEEDED    TRIAMTERENE-HYDROCHLOROTHIAZIDE 37.5-25 MG (MAXZIDE-25) 37.5-25 MG PER TABLET    Take 1 tablet by mouth once daily.     Review of patient's allergies indicates:   Allergen Reactions    Wasp venom Swelling    Grass pollen-june grass standard Hives and Itching    Latex, natural rubber     Pcn [penicillins] Other (See Comments)     Passed out and seizures     Review of Systems   Constitutional: Negative for malaise/fatigue.   HENT: Negative for hearing loss.    Eyes: Negative for double vision and visual disturbance.   Cardiovascular: Positive for chest pain.   Respiratory: Negative for shortness of breath.    Endocrine: Negative for cold intolerance.   Hematologic/Lymphatic: Does not bruise/bleed easily.   Skin: Negative for poor wound healing and suspicious lesions.   Musculoskeletal: Positive for arthritis, back pain, joint pain, joint swelling and muscle weakness. Negative for gout.   Gastrointestinal: Negative for nausea and vomiting.   Genitourinary: Negative for dysuria.   Neurological: Negative for numbness, paresthesias and sensory change.   Psychiatric/Behavioral: Negative for depression, memory loss and  substance abuse. The patient is not nervous/anxious.    Allergic/Immunologic: Negative for persistent infections.       Objective:   Body mass index is 25.23 kg/m².  Vitals:    02/16/22 1410   BP: 139/74   Pulse: 99                General    Constitutional: She is oriented to person, place, and time. She appears well-developed and well-nourished. No distress.   HENT:   Head: Normocephalic.   Eyes: EOM are normal.   Pulmonary/Chest: Effort normal.   Neurological: She is oriented to person, place, and time.   Psychiatric: She has a normal mood and affect.             Right Hand/Wrist Exam     Inspection   Scars: Wrist - absent Hand -  absent  Effusion: Wrist - absent     Other     Neuorologic Exam    Median Distribution: normal  Ulnar Distribution: normal  Radial Distribution: normal    Comments:  The patient is a 5 mm mucous cyst dorsal aspect right thumb.  There is no nail deformity.  There is an osteophyte palpable from the interphalangeal joint.  There are no motor or sensory deficits.          Vascular Exam       Capillary Refill  Right Hand: normal capillary refill      Relevant imaging results reviewed and interpreted by me, discussed with the patient and / or family today radiographs right thumb showed interphalangeal joint arthritis and is otherwise unrevealing  Assessment:     Encounter Diagnoses   Name Primary?    Pain of right thumb     Degenerative arthritis of thumb, right Yes        Plan:     The patient was counseled regarding the diagnosis.  She is clinically asymptomatic at this time.  She will return if she becomes more symptomatic.                Disclaimer: This note was prepared using a voice recognition system and is likely to have sound alike errors within the text.

## 2022-02-17 ENCOUNTER — PATIENT MESSAGE (OUTPATIENT)
Dept: RESEARCH | Facility: HOSPITAL | Age: 62
End: 2022-02-17
Payer: COMMERCIAL

## 2022-02-17 ENCOUNTER — CLINICAL SUPPORT (OUTPATIENT)
Dept: REHABILITATION | Facility: HOSPITAL | Age: 62
End: 2022-02-17
Payer: COMMERCIAL

## 2022-02-17 DIAGNOSIS — M54.42 CHRONIC BILATERAL LOW BACK PAIN WITH BILATERAL SCIATICA: ICD-10-CM

## 2022-02-17 DIAGNOSIS — Z74.09 DECREASED STRENGTH, ENDURANCE, AND MOBILITY: ICD-10-CM

## 2022-02-17 DIAGNOSIS — M47.816 LUMBAR SPONDYLOSIS: ICD-10-CM

## 2022-02-17 DIAGNOSIS — M54.41 CHRONIC BILATERAL LOW BACK PAIN WITH BILATERAL SCIATICA: ICD-10-CM

## 2022-02-17 DIAGNOSIS — R68.89 DECREASED STRENGTH, ENDURANCE, AND MOBILITY: ICD-10-CM

## 2022-02-17 DIAGNOSIS — R53.1 DECREASED STRENGTH, ENDURANCE, AND MOBILITY: ICD-10-CM

## 2022-02-17 DIAGNOSIS — M25.559 HIP PAIN: ICD-10-CM

## 2022-02-17 DIAGNOSIS — M47.816 LUMBAR FACET ARTHROPATHY: ICD-10-CM

## 2022-02-17 DIAGNOSIS — G89.29 CHRONIC BILATERAL LOW BACK PAIN WITH BILATERAL SCIATICA: ICD-10-CM

## 2022-02-17 PROCEDURE — 97110 THERAPEUTIC EXERCISES: CPT | Mod: CQ

## 2022-02-17 NOTE — PROGRESS NOTES
[facility]   Physical Therapy Daily Treatment Note     Name: Shahzad Redd [facility]  Clinic Number: [id number]    Therapy Diagnosis:   Encounter Diagnoses   Name Primary?    Hip pain     Lumbar spondylosis     Lumbar facet arthropathy     Decreased strength, endurance, and mobility     Chronic bilateral low back pain with bilateral sciatica      Physician: Colby Liu MD    Visit Date: 2/17/2022      Physician Orders: PT Eval and Treat  Medical Diagnosis from Referral: lumbar spondylosis, lumbar facet arthropathy  Evaluation Date: 11/26/2021  Authorization Period Expiration: 6/1/2022  Plan of Care Expiration: 5/5/2022  Progress Note Due: 3/6/2022  Visit # / Visits authorized: 11/20 (9+1 evaluation)  FOTO: 2/ 3      Precautions: Standard    PTA Visit #: 4/5     Time In: 1640  Time Out: 1715  Total Billable Time: 32 minutes     SUBJECTIVE     Patient reports: no tingling in her right foot. Did some exercise on her own without with pain. Had edema in her left leg after her last session. Her blood pressure has been good.       She was compliant with home exercise program over the weekend.     Response to previous treatment: fatigued after last session.     Functional change: increased standing tolerance, able to stand and kneel in Jainism without issue, able to get in and out of bed with less difficulty. Less loss of balance with house hold distances.     Pain: 0/10     Location: left great toe (no pain)     OBJECTIVE     Objective Measures updated at progress report unless specified.     TREATMENT       MANUAL THERAPY TECHNIQUES were applied for 0 minutes, including:    Manual Intervention Performed Today    Soft Tissue Mobilization   STM Right lateral hip and piriformis muscle   Joint Mobilizations  Lumbar manual traction    Mobilization with movement     Long leg distraction  Bilateral x 2 minutes   Functional Dry Needling        Plan for Next Visit:      THERAPEUTIC EXERCISES to  develop strength, endurance, ROM, flexibility, posture and core stabilization for 7 minutes including:    Intervention Performed Today    Exercise bike (try upright bike or shuttle next visit)  5 minutes level 2 for endurance cues for abdominal bracing   Elliptical   4 minutes level 1 for core endurance   Transverse abdominis contraction  3 minute     Glut sets  3 minutes    Posterior pelvic tilt  3 minutes   Supine marching with abdominal brace and opposite arm flexion x 10 x 3    Supine abdominal brace with bilateral hip flexion   10 x    Supine hip abduction with belt  3 minutes   Sitting hip adduction with ball   3 minutes   Bridges with abdominal brace  3 x 10 on blue swiss ball with arms folded hold 3 seconds (progressed)   Piriformis stretch supine   3 x 30 seconds   Hamstring stretch  3 x 30 seconds 90/90 technique   clams  2 x 10 Bilateral  Red band    SAQ     Straight leg raise with abdominal brace  3 x 10 (added straight arm pull down red band)        Single knee to chest  10 second holds x 10 opposite leg extended    Sitting lumbar flexion  10 x 10 seconds 3 directions     Plan for Next Visit:        NEUROMUSCULAR RE-EDUCATION ACTIVITIES to improve Balance, Coordination, Kinesthetic, Sense, Proprioception and Posture for 25 minutes.  The following were included:     Intervention Performed Today    Standing March x x 3 1 minute no hand support on ariex pad (progressed)   Side stepping  x Down and back on turf along mirror 6 x,  red band at ankles   Sit to stands with abdominal bracing  On chair with airex pad 2 x 8, x 10 red band at knees    Step ups    6 inch step 2 x 10 no hands    X 10 lateral 6 inch box   Forward step downs off of step  6 inch step, 2 x 10 bilateral no hands   payloff press  2 minutes each direction   Anti rotation walk outs x X 15 each direction purple cooks cord hui outstretched on truf   1/2 kneeling in tandem kneeling x X 2 minutes each leg shuoulder horizontal abduction  (progressed) holding 2 pounds progressing with head turning following weight   Table tops  x 2 x 10 5 second hold    Table tops with alternating upper extremity overhead reach x 3 x 5   Dead bugs x 3 x 20 seconds (increased)   Quadruped alt LE  2 x 10   Suitcase carry  Down and back on turn along two mirrors, 5#   Farmer's March  One arm up, one arm at side, 2# each hand, 10x each LE   Prone planks  10 x 10 seconds knees and elbows   Side lying planks  X 5 10 second hold knees and elbow   Heel raises x 3 x 1 minute on airex pad   Monster walks  x 3 laps forward/backward red band at knees          Plan for Next Visit:         PATIENT EDUCATION AND HOME EXERCISES     Home Exercises Provided and Patient Education Provided     Education provided: (during session) minutes   Patient educated on biomechanical justification for therapeutic exercise and importance of compliance with HEP in order to improve overall impairments and QOL    Patient was educated on all the above exercise prior/during/after for proper posture, positioning, and execution for safe performance with home exercise program.     Written Home Exercises Provided: yes.  Exercises were reviewed and Shahzad was able to demonstrate them prior to the end of the session.  Shahzad demonstrated fair  understanding of the education provided. See EMR under Patient Instructions for exercises provided during therapy sessions.    ASSESSMENT     Patient tolerated treatment with progressions of core activity. Patient noted to have fatigue with monster walks forward and backward.         Shahzad is progressing well towards her goals.   Pt prognosis is Good.     Pt will continue to benefit from skilled outpatient physical therapy to address the deficits listed in the problem list box on initial evaluation, provide pt/family education and to maximize pt's level of independence in the home and community environment.     Pt's spiritual, cultural and educational needs considered and  pt agreeable to plan of care and goals.       Anticipated Barriers for therapy: co-morbidities, sedentary lifestyle, chronicity of condition and adherence to treatment plan      GOALS:     Short Term Goals:  6 weeks Progress    1. Pain: Pt will demonstrate improved pain by reports of less than or equal to 6/10 worst pain on the verbal rating scale in order to progress toward maximal functional ability and improve QOL. Met   2. Function: Patient will demonstrate improved function as indicated by a functional limitation score of less than or equal to 49 out of 100 on FOTO. Met   3. Mobility: Patient will improve AROM to 50% of stated goals, listed in objective measures above, in order to progress towards independence with functional activities.  Met   4. Strength: Patient will improve strength to 50% of stated goals, listed in objective measures above, in order to progress towards independence with functional activities.  PM   5. Gait: Patient will demonstrate improved gait mechanics in order to improve functional mobility, improve quality of life, and decrease risk of further injury or fall.  Met   6. HEP: Patient will demonstrate independence with HEP in order to progress toward functional independence. Met   7. Improve postural awareness.  Met      Long Term Goals:  12 weeks Progress   1. Pain: Pt will demonstrate improved pain by reports of less than or equal to 4/10 worst pain on the verbal rating scale in order to progress toward maximal functional ability and improve QOL.   Met   2. Function: Patient will demonstrate improved function as indicated by a functional limitation score of less than or equal to 42 out of 100 on FOTO. PC   3. Mobility: Patient will improve AROM to stated goals, listed in objective measures above, in order to return to maximal functional potential and improve quality of life. PM   4. Strength: Patient will improve strength to stated goals, listed in objective measures above, in order to  improve functional independence and quality of life. PM   5. Gait: Patient will demonstrate normalized gait mechanics with minimal compensation in order to return to PLOF. PM   6. Patient will return to normal ADL's, IADL's, community involvement, recreational activities, and work-related activities with less than or equal to 2/10 pain and maximal function.  PC        Goals Key:  PC= progressing/continue; PM= partially met;        DC= discontinue    PLAN     Continue Plan of Care to strengthen core and hips allowing her to be able to return to all daily activities with no pain.     12/29/2021: It is my recommendation that patient continue with PT at her current frequency of 2 times per week for the remainder of her approved visits. Her treatment plan will remain the same, and she will be progressed appropriately.     Gaetano Baptiste, PTA

## 2022-02-22 ENCOUNTER — CLINICAL SUPPORT (OUTPATIENT)
Dept: REHABILITATION | Facility: HOSPITAL | Age: 62
End: 2022-02-22
Payer: COMMERCIAL

## 2022-02-22 DIAGNOSIS — M47.816 LUMBAR FACET ARTHROPATHY: ICD-10-CM

## 2022-02-22 DIAGNOSIS — Z74.09 DECREASED STRENGTH, ENDURANCE, AND MOBILITY: ICD-10-CM

## 2022-02-22 DIAGNOSIS — R53.1 DECREASED STRENGTH, ENDURANCE, AND MOBILITY: ICD-10-CM

## 2022-02-22 DIAGNOSIS — R68.89 DECREASED STRENGTH, ENDURANCE, AND MOBILITY: ICD-10-CM

## 2022-02-22 DIAGNOSIS — M25.559 HIP PAIN: Primary | ICD-10-CM

## 2022-02-22 DIAGNOSIS — M47.816 LUMBAR SPONDYLOSIS: ICD-10-CM

## 2022-02-22 DIAGNOSIS — M54.41 CHRONIC BILATERAL LOW BACK PAIN WITH BILATERAL SCIATICA: ICD-10-CM

## 2022-02-22 DIAGNOSIS — M54.42 CHRONIC BILATERAL LOW BACK PAIN WITH BILATERAL SCIATICA: ICD-10-CM

## 2022-02-22 DIAGNOSIS — G89.29 CHRONIC BILATERAL LOW BACK PAIN WITH BILATERAL SCIATICA: ICD-10-CM

## 2022-02-22 PROCEDURE — 97112 NEUROMUSCULAR REEDUCATION: CPT | Performed by: PHYSICAL THERAPIST

## 2022-02-22 NOTE — PROGRESS NOTES
[facility]   Physical Therapy Daily Treatment Note     Name: Shahzad [facility]d [facility]  Clinic Number: [id number]    Therapy Diagnosis:   Encounter Diagnoses   Name Primary?    Hip pain Yes    Lumbar spondylosis     Lumbar facet arthropathy     Decreased strength, endurance, and mobility     Chronic bilateral low back pain with bilateral sciatica      Physician: Colby Liu MD    Visit Date: 2/22/2022      Physician Orders: PT Eval and Treat  Medical Diagnosis from Referral: lumbar spondylosis, lumbar facet arthropathy  Evaluation Date: 11/26/2021  Authorization Period Expiration: 6/1/2022  Plan of Care Expiration: 5/5/2022  Progress Note Due: 3/6/2022  Visit # / Visits authorized: 12/20 (9+1 evaluation)  FOTO: 2/ 3      Precautions: Standard    PTA Visit #: 4/5     Time In: 1736  Time Out: 1816  Total Billable Time: 38 minutes     SUBJECTIVE     Patient reports: that she continues to feel better overall. She has some stiffness in her knees and achyness in her lower legs, but that may be due to her not moving as much as she should. Patient is okay with making next visit her last and transitioning to an independent HEP.      She was compliant with home exercise program over the weekend.     Response to previous treatment: fatigued after last session.     Functional change: increased standing tolerance, able to stand and kneel in [employer] without issue, able to get in and out of bed with less difficulty. Less loss of balance with house hold distances.     Pain: 0/10     Location: left great toe (no pain)     OBJECTIVE     Objective Measures updated at progress report unless specified.     TREATMENT       MANUAL THERAPY TECHNIQUES were applied for 0 minutes, including:    Manual Intervention Performed Today    Soft Tissue Mobilization   STM Right lateral hip and piriformis muscle   Joint Mobilizations  Lumbar manual traction    Mobilization with movement     Long leg distraction  Bilateral x  2 minutes   Functional Dry Needling        Plan for Next Visit:      THERAPEUTIC EXERCISES to develop strength, endurance, ROM, flexibility, posture and core stabilization for 0 minutes including:    Intervention Performed Today    Exercise bike (try upright bike or shuttle next visit)  5 minutes level 2 for endurance cues for abdominal bracing   Elliptical   4 minutes level 1 for core endurance   Transverse abdominis contraction  3 minute     Glut sets  3 minutes    Posterior pelvic tilt  3 minutes   Supine marching with abdominal brace and opposite arm flexion  10 x 3    Supine abdominal brace with bilateral hip flexion   10 x    Supine hip abduction with belt  3 minutes   Sitting hip adduction with ball   3 minutes   Bridges with abdominal brace  3 x 10 on blue swiss ball with arms folded hold 3 seconds (progressed)   Piriformis stretch supine   3 x 30 seconds   Hamstring stretch  3 x 30 seconds 90/90 technique   clams  2 x 10 Bilateral  Red band    SAQ     Straight leg raise with abdominal brace  3 x 10 (added straight arm pull down red band)        Single knee to chest  10 second holds x 10 opposite leg extended    Sitting lumbar flexion  10 x 10 seconds 3 directions     Plan for Next Visit:        NEUROMUSCULAR RE-EDUCATION ACTIVITIES to improve Balance, Coordination, Kinesthetic, Sense, Proprioception and Posture for 38 minutes.  The following were included:     Intervention Performed Today    Standing March x x 3 1 minute no hand support on ariex pad (progressed)   Side stepping  x Down and back on turf along mirror 6 x,  green band at ankles   Sit to stands with abdominal bracing  On chair with airex pad 2 x 8, x 10 red band at knees    Step ups    6 inch step 2 x 10 no hands    X 10 lateral 6 inch box   Forward step downs off of step  6 inch step, 2 x 10 bilateral no hands   payloff press  2 minutes each direction   Anti rotation walk outs  X 15 each direction purple cooks cord hui outstretched on truf    1/2 kneeling in tandem kneeling x X 2 minutes each leg shuoulder horizontal abduction (progressed) holding 2 pounds progressing with head turning following weight   Table tops  x 2 x 10 5 second hold    Table tops with arm arc x 3 x 5, 2# ball   Dead bugs x 3 x 20 seconds   Quadruped alt LE  2 x 10   Suitcase carry  Down and back on turn along two mirrors, 5#   Farmer's March  One arm up, one arm at side, 2# each hand, 10x each LE   Prone planks  10 x 10 seconds knees and elbows   Side lying planks  X 5 10 second hold knees and elbow   Heel raises x 3 x 1 minute on airex pad   Monster walks  x 3 laps forward/backward green band at knees          Plan for Next Visit:         PATIENT EDUCATION AND HOME EXERCISES     Home Exercises Provided and Patient Education Provided     Education provided: (during session) minutes   Patient educated on biomechanical justification for therapeutic exercise and importance of compliance with HEP in order to improve overall impairments and QOL    Patient was educated on all the above exercise prior/during/after for proper posture, positioning, and execution for safe performance with home exercise program.     Written Home Exercises Provided: yes.  Exercises were reviewed and Shahzad was able to demonstrate them prior to the end of the session.  Shahzad demonstrated fair  understanding of the education provided. See EMR under Patient Instructions for exercises provided during therapy sessions.    ASSESSMENT     Patient did well with treatment today and completed exercises without increased complaint. Patient still fatigues easily with full body core activities, but patient demonstrates significant overall progress is core strength and stabilization. Discussed remaining compliant with HEP after last visit this week, and patient expressed understanding. Patient demonstrates improvements in generalized LE strength and endurance as well. For example, functionally, she is able to get in and  out of the bathtub and car without issue now. She is appropriate to be transitioned to independent HEP following her last scheduled visit this week.         Shahzad is progressing well towards her goals.   Pt prognosis is Good.     Pt will continue to benefit from skilled outpatient physical therapy to address the deficits listed in the problem list box on initial evaluation, provide pt/family education and to maximize pt's level of independence in the home and community environment.     Pt's spiritual, cultural and educational needs considered and pt agreeable to plan of care and goals.       Anticipated Barriers for therapy: co-morbidities, sedentary lifestyle, chronicity of condition and adherence to treatment plan      GOALS:     Short Term Goals:  6 weeks Progress    1. Pain: Pt will demonstrate improved pain by reports of less than or equal to 6/10 worst pain on the verbal rating scale in order to progress toward maximal functional ability and improve QOL. Met   2. Function: Patient will demonstrate improved function as indicated by a functional limitation score of less than or equal to 49 out of 100 on FOTO. Met   3. Mobility: Patient will improve AROM to 50% of stated goals, listed in objective measures above, in order to progress towards independence with functional activities.  Met   4. Strength: Patient will improve strength to 50% of stated goals, listed in objective measures above, in order to progress towards independence with functional activities.  PM   5. Gait: Patient will demonstrate improved gait mechanics in order to improve functional mobility, improve quality of life, and decrease risk of further injury or fall.  Met   6. HEP: Patient will demonstrate independence with HEP in order to progress toward functional independence. Met   7. Improve postural awareness.  Met      Long Term Goals:  12 weeks Progress   1. Pain: Pt will demonstrate improved pain by reports of less than or equal to 4/10 worst  pain on the verbal rating scale in order to progress toward maximal functional ability and improve QOL.   Met   2. Function: Patient will demonstrate improved function as indicated by a functional limitation score of less than or equal to 42 out of 100 on FOTO. PC   3. Mobility: Patient will improve AROM to stated goals, listed in objective measures above, in order to return to maximal functional potential and improve quality of life. PM   4. Strength: Patient will improve strength to stated goals, listed in objective measures above, in order to improve functional independence and quality of life. PM   5. Gait: Patient will demonstrate normalized gait mechanics with minimal compensation in order to return to PLOF. PM   6. Patient will return to normal ADL's, IADL's, community involvement, recreational activities, and work-related activities with less than or equal to 2/10 pain and maximal function.  PC        Goals Key:  PC= progressing/continue; PM= partially met;        DC= discontinue    PLAN     Continue Plan of Care to strengthen core and hips allowing her to be able to return to all daily activities with no pain.     12/29/2021: It is my recommendation that patient continue with PT at her current frequency of 2 times per week for the remainder of her approved visits. Her treatment plan will remain the same, and she will be progressed appropriately.     Evelyn Stoo, PT

## 2022-02-24 ENCOUNTER — CLINICAL SUPPORT (OUTPATIENT)
Dept: REHABILITATION | Facility: HOSPITAL | Age: 62
End: 2022-02-24
Payer: COMMERCIAL

## 2022-02-24 DIAGNOSIS — M54.41 CHRONIC BILATERAL LOW BACK PAIN WITH BILATERAL SCIATICA: ICD-10-CM

## 2022-02-24 DIAGNOSIS — M25.559 HIP PAIN: Primary | ICD-10-CM

## 2022-02-24 DIAGNOSIS — M54.42 CHRONIC BILATERAL LOW BACK PAIN WITH BILATERAL SCIATICA: ICD-10-CM

## 2022-02-24 DIAGNOSIS — Z74.09 DECREASED STRENGTH, ENDURANCE, AND MOBILITY: ICD-10-CM

## 2022-02-24 DIAGNOSIS — M47.816 LUMBAR FACET ARTHROPATHY: ICD-10-CM

## 2022-02-24 DIAGNOSIS — R68.89 DECREASED STRENGTH, ENDURANCE, AND MOBILITY: ICD-10-CM

## 2022-02-24 DIAGNOSIS — G89.29 CHRONIC BILATERAL LOW BACK PAIN WITH BILATERAL SCIATICA: ICD-10-CM

## 2022-02-24 DIAGNOSIS — M47.816 LUMBAR SPONDYLOSIS: ICD-10-CM

## 2022-02-24 DIAGNOSIS — R53.1 DECREASED STRENGTH, ENDURANCE, AND MOBILITY: ICD-10-CM

## 2022-02-24 PROCEDURE — 97110 THERAPEUTIC EXERCISES: CPT | Mod: CQ

## 2022-02-24 NOTE — PROGRESS NOTES
[facility]   Physical Therapy Daily Treatment Note     [facility]  [facility] [id number]    Therapy Diagnosis:   Encounter Diagnoses   Name Primary?    Hip pain Yes    Lumbar spondylosis     Lumbar facet arthropathy     Decreased strength, endurance, and mobility     Chronic bilateral low back pain with bilateral sciatica      Physician: Colby Liu MD    Visit Date: 2/24/2022      Physician Orders: PT Eval and Treat  Medical Diagnosis from Referral: lumbar spondylosis, lumbar facet arthropathy  Evaluation Date: 11/26/2021  Authorization Period Expiration: 6/1/2022  Plan of Care Expiration: 5/5/2022  Progress Note Due: 3/6/2022  Visit # / Visits authorized: 12/20 (9+1 evaluation)  FOTO: 2/ 3      Precautions: Standard    PTA Visit #: 1/5     Time In: 1720  Time Out: 1800  Total Billable Time: 40 minutes     SUBJECTIVE     Patient reports: that she exercised this morning with no complaints. Realizes she needs to be more diligent with sticking to her home exercise program and just needs to be more active in general.      She was compliant with home exercise program over the weekend.     Response to previous treatment: fatigued after last session.     Functional change: unable to perform her home chores without taking a rest before they are complete. Reports she is able to move outdoor patio furniture and potted plants.      Pain: 0/10     Location: left great toe (no pain)     OBJECTIVE     Objective Measures updated at progress report unless specified.       RANGE OF MOTION:     Lumbar Right  (spine) Left    Pain/Dysfunction with Movement Goal Right 2/23/2022 Left 2/23/2022   Lumbar Flexion  44  (30 initial) ---   60 50 ---   Lumbar Extension 18  (10 initial) --- relieves pain 15 20 ---   Lumbar Side Bending 19  (15 initial) 20  (13 initial) Did not cause radicular numbness today with SB R 20 25 20         STRENGTH:        L/E MMT Right  (spine) Left  Pain/Dysfunction with Movement Right  2/4/2022 Left  2/4/2022  Goal Right 2/23/2022 Left 2/23/2022   Modified (90/90) Abdominal Strength  Below average ---   Below average but improved --- average Average    Hip Flexion  4-/5 4-/5  Minimal pain in R hip 4 4+ 4+/5 B 4/5 4/5   Hip Extension  4-/5 4-/5   4 4+ 4+/5 B 4+/5 4+/5   Hip Abduction  4-/5 4-/5   4 4 4+/5 B 4+/5 4+/5   Knee Extension 4/5 4-/5   4+ 4+ 5/5 B 5/5 5/5   Knee Flexion 4/5 4/5   5 5 5/5 B 5/5 5/5   Ankle DF 4+/5 4+/5   5 5 5/5 B 5/5 5/5   Ankle PF 4+/5 4+/5   5 5 5/5 B 5/5 5/5           TREATMENT       MANUAL THERAPY TECHNIQUES were applied for 0 minutes, including:    Manual Intervention Performed Today    Soft Tissue Mobilization   STM Right lateral hip and piriformis muscle   Joint Mobilizations  Lumbar manual traction    Mobilization with movement     Long leg distraction  Bilateral x 2 minutes   Functional Dry Needling        Plan for Next Visit:      THERAPEUTIC EXERCISES to develop strength, endurance, ROM, flexibility, posture and core stabilization for 40 minutes including: obtaining objective measurements    Intervention Performed Today    Exercise bike (try upright bike or shuttle next visit)  5 minutes level 2 for endurance cues for abdominal bracing   Elliptical  x 4 minutes level 1 for core endurance   Transverse abdominis contraction  3 minute     Glut sets  3 minutes    Posterior pelvic tilt  3 minutes   Supine marching with abdominal brace and opposite arm flexion  10 x 3    Supine abdominal brace with bilateral hip flexion   10 x    Supine hip abduction with belt  3 minutes   Sitting hip adduction with ball   3 minutes   Bridges with abdominal brace  3 x 10 on blue swiss ball with arms folded hold 3 seconds (progressed)   Piriformis stretch supine   3 x 30 seconds   Hamstring stretch  3 x 30 seconds 90/90 technique   clams  2 x 10 Bilateral  Red band    SAQ     Straight leg raise with abdominal brace  3 x 10 (added straight arm pull  down red band)        Single knee to chest  10 second holds x 10 opposite leg extended    Sitting lumbar flexion  10 x 10 seconds 3 directions     Plan for Next Visit:        NEUROMUSCULAR RE-EDUCATION ACTIVITIES to improve Balance, Coordination, Kinesthetic, Sense, Proprioception and Posture for 0 minutes.  The following were included:     Intervention Performed Today    Standing March  x 3 1 minute no hand support on ariex pad (progressed)   Side stepping   Down and back on turf along mirror 6 x,  green band at ankles   Sit to stands with abdominal bracing  On chair with airex pad 2 x 8, x 10 red band at knees    Step ups    6 inch step 2 x 10 no hands    X 10 lateral 6 inch box   Forward step downs off of step  6 inch step, 2 x 10 bilateral no hands   payloff press  2 minutes each direction   Anti rotation walk outs  X 15 each direction purple cooks cord hui outstretched on truf   1/2 kneeling in tandem kneeling  X 2 minutes each leg shuoulder horizontal abduction (progressed) holding 2 pounds progressing with head turning following weight   Table tops   2 x 10 5 second hold    Table tops with arm arc  3 x 5, 2# ball   Dead bugs  3 x 20 seconds   Quadruped alt LE  2 x 10   Suitcase carry  Down and back on turn along two mirrors, 5#   Farmer's March  One arm up, one arm at side, 2# each hand, 10x each LE   Prone planks  10 x 10 seconds knees and elbows   Side lying planks  X 5 10 second hold knees and elbow   Heel raises  3 x 1 minute on airex pad   Monster walks   3 laps forward/backward green band at knees          Plan for Next Visit:         PATIENT EDUCATION AND HOME EXERCISES     Home Exercises Provided and Patient Education Provided     Education provided: (during session) minutes   Patient educated on biomechanical justification for therapeutic exercise and importance of compliance with HEP in order to improve overall impairments and QOL    Patient was educated on all the above exercise  prior/during/after for proper posture, positioning, and execution for safe performance with home exercise program.     Written Home Exercises Provided: yes.  Exercises were reviewed and Shahzad was able to demonstrate them prior to the end of the session.  Shahzad demonstrated fair  understanding of the education provided. See EMR under Patient Instructions for exercises provided during therapy sessions.        ASSESSMENT     Patient states she has made a 90% improvement in her junctional ability since starting physical therapy but still has difficulty with performing her home chores such as laundry and squatting to retrieve objects from the lower kitchen cabinets. She is able to get in and out of the bathtub and car without issue now. She has demonstrated an improvement in the following: increased lumbar flexion and lumbar right rotation and also she is stronger in her bilateral quads. She is appropriate to be transitioned to independent HEP following her last scheduled visit this week.         Shahzad is progressing well towards her goals.   Pt prognosis is Good.     Pt will continue to benefit from skilled outpatient physical therapy to address the deficits listed in the problem list box on initial evaluation, provide pt/family education and to maximize pt's level of independence in the home and community environment.     Pt's spiritual, cultural and educational needs considered and pt agreeable to plan of care and goals.       Anticipated Barriers for therapy: co-morbidities, sedentary lifestyle, chronicity of condition and adherence to treatment plan      GOALS:     Short Term Goals:  6 weeks Progress    1. Pain: Pt will demonstrate improved pain by reports of less than or equal to 6/10 worst pain on the verbal rating scale in order to progress toward maximal functional ability and improve QOL. Met   2. Function: Patient will demonstrate improved function as indicated by a functional limitation score of less than or  equal to 49 out of 100 on FOTO. Met   3. Mobility: Patient will improve AROM to 50% of stated goals, listed in objective measures above, in order to progress towards independence with functional activities.  Met   4. Strength: Patient will improve strength to 50% of stated goals, listed in objective measures above, in order to progress towards independence with functional activities.  PM   5. Gait: Patient will demonstrate improved gait mechanics in order to improve functional mobility, improve quality of life, and decrease risk of further injury or fall.  Met   6. HEP: Patient will demonstrate independence with HEP in order to progress toward functional independence. Met   7. Improve postural awareness.  Met      Long Term Goals:  12 weeks Progress   1. Pain: Pt will demonstrate improved pain by reports of less than or equal to 4/10 worst pain on the verbal rating scale in order to progress toward maximal functional ability and improve QOL.   Met   2. Function: Patient will demonstrate improved function as indicated by a functional limitation score of less than or equal to 42 out of 100 on FOTO. PC   3. Mobility: Patient will improve AROM to stated goals, listed in objective measures above, in order to return to maximal functional potential and improve quality of life. PM   4. Strength: Patient will improve strength to stated goals, listed in objective measures above, in order to improve functional independence and quality of life. PM   5. Gait: Patient will demonstrate normalized gait mechanics with minimal compensation in order to return to PLOF. PM   6. Patient will return to normal ADL's, IADL's, community involvement, recreational activities, and work-related activities with less than or equal to 2/10 pain and maximal function.  PC        Goals Key:  PC= progressing/continue; PM= partially met;        DC= discontinue    PLAN     See discharge summary for full details.       Gaetano Baptiste, PTA

## 2022-02-25 NOTE — PLAN OF CARE
[facility]   Physical Therapy Daily Treatment Note + Discharge Summary    Name: Shahzad Bautistamievaristo  [facility] Number: [id number]    Therapy Diagnosis:   Encounter Diagnoses   Name Primary?    Hip pain Yes    Lumbar spondylosis     Lumbar facet arthropathy     Decreased strength, endurance, and mobility     Chronic bilateral low back pain with bilateral sciatica      Physician: Colby Liu MD    Visit Date: 2/24/2022      Physician Orders: PT Eval and Treat  Medical Diagnosis from Referral: lumbar spondylosis, lumbar facet arthropathy  Evaluation Date: 11/26/2021  Authorization Period Expiration: 6/1/2022  Plan of Care Expiration: 5/5/2022  Progress Note Due: 3/6/2022  Visit # / Visits authorized: 12/20 (9+1 evaluation)  FOTO: 2/ 3      Precautions: Standard    PTA Visit #: 1/5     Time In: 1720  Time Out: 1800  Total Billable Time: 40 minutes     SUBJECTIVE     Patient reports: that she exercised this morning with no complaints. Realizes she needs to be more diligent with sticking to her home exercise program and just needs to be more active in general.      She was compliant with home exercise program over the weekend.     Response to previous treatment: fatigued after last session.     Functional change: unable to perform her home chores without taking a rest before they are complete. Reports she is able to move outdoor patio furniture and potted plants.      Pain: 0/10     Location: left great toe (no pain)     OBJECTIVE     Objective Measures updated at progress report unless specified.       RANGE OF MOTION:     Lumbar Right  (spine) Left    Pain/Dysfunction with Movement Goal Right 2/23/2022 Left 2/23/2022   Lumbar Flexion  44  (30 initial) ---   60 50 ---   Lumbar Extension 18  (10 initial) --- relieves pain 15 20 ---   Lumbar Side Bending 19  (15 initial) 20  (13 initial) Did not cause radicular numbness today with SB R 20 25 20         STRENGTH:        L/E MMT  Right  (spine) Left Pain/Dysfunction with Movement Right  2/4/2022 Left  2/4/2022  Goal Right 2/23/2022 Left 2/23/2022   Modified (90/90) Abdominal Strength  Below average ---   Below average but improved --- average Average    Hip Flexion  4-/5 4-/5  Minimal pain in R hip 4 4+ 4+/5 B 4/5 4/5   Hip Extension  4-/5 4-/5   4 4+ 4+/5 B 4+/5 4+/5   Hip Abduction  4-/5 4-/5   4 4 4+/5 B 4+/5 4+/5   Knee Extension 4/5 4-/5   4+ 4+ 5/5 B 5/5 5/5   Knee Flexion 4/5 4/5   5 5 5/5 B 5/5 5/5   Ankle DF 4+/5 4+/5   5 5 5/5 B 5/5 5/5   Ankle PF 4+/5 4+/5   5 5 5/5 B 5/5 5/5           TREATMENT       MANUAL THERAPY TECHNIQUES were applied for 0 minutes, including:    Manual Intervention Performed Today    Soft Tissue Mobilization   STM Right lateral hip and piriformis muscle   Joint Mobilizations  Lumbar manual traction    Mobilization with movement     Long leg distraction  Bilateral x 2 minutes   Functional Dry Needling        Plan for Next Visit:      THERAPEUTIC EXERCISES to develop strength, endurance, ROM, flexibility, posture and core stabilization for 40 minutes including: obtaining objective measurements    Intervention Performed Today    Exercise bike (try upright bike or shuttle next visit)  5 minutes level 2 for endurance cues for abdominal bracing   Elliptical  x 4 minutes level 1 for core endurance   Transverse abdominis contraction  3 minute     Glut sets  3 minutes    Posterior pelvic tilt  3 minutes   Supine marching with abdominal brace and opposite arm flexion  10 x 3    Supine abdominal brace with bilateral hip flexion   10 x    Supine hip abduction with belt  3 minutes   Sitting hip adduction with ball   3 minutes   Bridges with abdominal brace  3 x 10 on blue swiss ball with arms folded hold 3 seconds (progressed)   Piriformis stretch supine   3 x 30 seconds   Hamstring stretch  3 x 30 seconds 90/90 technique   clams  2 x 10 Bilateral  Red band    SAQ     Straight leg raise with abdominal brace  3 x 10  (added straight arm pull down red band)        Single knee to chest  10 second holds x 10 opposite leg extended    Sitting lumbar flexion  10 x 10 seconds 3 directions     Plan for Next Visit:        NEUROMUSCULAR RE-EDUCATION ACTIVITIES to improve Balance, Coordination, Kinesthetic, Sense, Proprioception and Posture for 0 minutes.  The following were included:     Intervention Performed Today    Standing March  x 3 1 minute no hand support on ariex pad (progressed)   Side stepping   Down and back on turf along mirror 6 x,  green band at ankles   Sit to stands with abdominal bracing  On chair with airex pad 2 x 8, x 10 red band at knees    Step ups    6 inch step 2 x 10 no hands    X 10 lateral 6 inch box   Forward step downs off of step  6 inch step, 2 x 10 bilateral no hands   payloff press  2 minutes each direction   Anti rotation walk outs  X 15 each direction purple cooks cord hui outstretched on truf   1/2 kneeling in tandem kneeling  X 2 minutes each leg shuoulder horizontal abduction (progressed) holding 2 pounds progressing with head turning following weight   Table tops   2 x 10 5 second hold    Table tops with arm arc  3 x 5, 2# ball   Dead bugs  3 x 20 seconds   Quadruped alt LE  2 x 10   Suitcase carry  Down and back on turn along two mirrors, 5#   Farmer's March  One arm up, one arm at side, 2# each hand, 10x each LE   Prone planks  10 x 10 seconds knees and elbows   Side lying planks  X 5 10 second hold knees and elbow   Heel raises  3 x 1 minute on airex pad   Monster walks   3 laps forward/backward green band at knees          Plan for Next Visit:         PATIENT EDUCATION AND HOME EXERCISES     Home Exercises Provided and Patient Education Provided     Education provided: (during session) minutes   Patient educated on biomechanical justification for therapeutic exercise and importance of compliance with HEP in order to improve overall impairments and QOL    Patient was educated on all the above  exercise prior/during/after for proper posture, positioning, and execution for safe performance with home exercise program.     Written Home Exercises Provided: yes.  Exercises were reviewed and Shahzad was able to demonstrate them prior to the end of the session.  Shahzad demonstrated fair  understanding of the education provided. See EMR under Patient Instructions for exercises provided during therapy sessions.        ASSESSMENT     Patient states she has made a 90% improvement in her functional ability since starting physical therapy but still has difficulty with performing her home chores such as laundry and squatting to retrieve objects from the lower kitchen cabinets. She is able to get in and out of the bathtub and car without issue now. She has demonstrated an improvement in the following: increased lumbar flexion and lumbar right rotation and also she is stronger in her bilateral quads. She is appropriate to be transitioned to independent HEP following her last scheduled visit this week.         Shahzad is progressing well towards her goals.   Pt prognosis is Good.     Pt will continue to benefit from skilled outpatient physical therapy to address the deficits listed in the problem list box on initial evaluation, provide pt/family education and to maximize pt's level of independence in the home and community environment.     Pt's spiritual, cultural and educational needs considered and pt agreeable to plan of care and goals.       Anticipated Barriers for therapy: co-morbidities, sedentary lifestyle, chronicity of condition and adherence to treatment plan      GOALS:     Short Term Goals:  6 weeks Progress    1. Pain: Pt will demonstrate improved pain by reports of less than or equal to 6/10 worst pain on the verbal rating scale in order to progress toward maximal functional ability and improve QOL. Met   2. Function: Patient will demonstrate improved function as indicated by a functional limitation score of less  than or equal to 49 out of 100 on FOTO. Met   3. Mobility: Patient will improve AROM to 50% of stated goals, listed in objective measures above, in order to progress towards independence with functional activities.  Met   4. Strength: Patient will improve strength to 50% of stated goals, listed in objective measures above, in order to progress towards independence with functional activities.  PM   5. Gait: Patient will demonstrate improved gait mechanics in order to improve functional mobility, improve quality of life, and decrease risk of further injury or fall.  Met   6. HEP: Patient will demonstrate independence with HEP in order to progress toward functional independence. Met   7. Improve postural awareness.  Met      Long Term Goals:  12 weeks Progress   1. Pain: Pt will demonstrate improved pain by reports of less than or equal to 4/10 worst pain on the verbal rating scale in order to progress toward maximal functional ability and improve QOL.   Met   2. Function: Patient will demonstrate improved function as indicated by a functional limitation score of less than or equal to 42 out of 100 on FOTO. Met   3. Mobility: Patient will improve AROM to stated goals, listed in objective measures above, in order to return to maximal functional potential and improve quality of life. PM   4. Strength: Patient will improve strength to stated goals, listed in objective measures above, in order to improve functional independence and quality of life. PM   5. Gait: Patient will demonstrate normalized gait mechanics with minimal compensation in order to return to PLOF. PM   6. Patient will return to normal ADL's, IADL's, community involvement, recreational activities, and work-related activities with less than or equal to 2/10 pain and maximal function.  Met        Goals Key:  PC= progressing/continue; PM= partially met;        DC= discontinue    PLAN     2/24/2022: Patient is considered DC from PT with HEP at this time.        Gaetano Baptiste, PTA  Evelyn Soto, PT, DPT

## 2022-04-26 ENCOUNTER — PATIENT MESSAGE (OUTPATIENT)
Dept: ADMINISTRATIVE | Facility: HOSPITAL | Age: 62
End: 2022-04-26
Payer: COMMERCIAL

## 2022-07-08 PROBLEM — R68.89 DECREASED STRENGTH, ENDURANCE, AND MOBILITY: Status: RESOLVED | Noted: 2021-11-26 | Resolved: 2022-07-08

## 2022-07-08 PROBLEM — M25.559 HIP PAIN: Status: RESOLVED | Noted: 2021-11-26 | Resolved: 2022-07-08

## 2022-07-08 PROBLEM — Z74.09 DECREASED STRENGTH, ENDURANCE, AND MOBILITY: Status: RESOLVED | Noted: 2021-11-26 | Resolved: 2022-07-08

## 2022-07-08 PROBLEM — G89.29 CHRONIC BILATERAL LOW BACK PAIN WITH BILATERAL SCIATICA: Status: RESOLVED | Noted: 2021-11-26 | Resolved: 2022-07-08

## 2022-07-08 PROBLEM — M54.42 CHRONIC BILATERAL LOW BACK PAIN WITH BILATERAL SCIATICA: Status: RESOLVED | Noted: 2021-11-26 | Resolved: 2022-07-08

## 2022-07-08 PROBLEM — M47.816 LUMBAR FACET ARTHROPATHY: Status: RESOLVED | Noted: 2021-11-26 | Resolved: 2022-07-08

## 2022-07-08 PROBLEM — M54.41 CHRONIC BILATERAL LOW BACK PAIN WITH BILATERAL SCIATICA: Status: RESOLVED | Noted: 2021-11-26 | Resolved: 2022-07-08

## 2022-07-08 PROBLEM — M47.816 LUMBAR SPONDYLOSIS: Status: RESOLVED | Noted: 2021-11-26 | Resolved: 2022-07-08

## 2022-07-08 PROBLEM — R53.1 DECREASED STRENGTH, ENDURANCE, AND MOBILITY: Status: RESOLVED | Noted: 2021-11-26 | Resolved: 2022-07-08

## 2022-08-04 DIAGNOSIS — Z12.31 OTHER SCREENING MAMMOGRAM: ICD-10-CM

## 2022-08-09 ENCOUNTER — PATIENT MESSAGE (OUTPATIENT)
Dept: ADMINISTRATIVE | Facility: HOSPITAL | Age: 62
End: 2022-08-09
Payer: COMMERCIAL

## 2022-10-04 ENCOUNTER — PATIENT MESSAGE (OUTPATIENT)
Dept: ADMINISTRATIVE | Facility: HOSPITAL | Age: 62
End: 2022-10-04
Payer: COMMERCIAL

## 2022-10-13 ENCOUNTER — TELEPHONE (OUTPATIENT)
Dept: INTERNAL MEDICINE | Facility: CLINIC | Age: 62
End: 2022-10-13
Payer: COMMERCIAL

## 2022-10-13 NOTE — TELEPHONE ENCOUNTER
----- Message from Kalyani Sanchez sent at 10/13/2022  9:13 AM CDT -----  Contact: Shahzad  Type:  Sooner Apoointment Request    Caller is requesting a sooner appointment. Caller will not accept being placed on the waitlist and is requesting a message be sent to doctor.  Name of Caller:Shahzad  When is the first available appointment?scheduled 10/19/22  Symptoms:physical  Would the patient rather a call back or a response via [facility]? call  Best Call Back Number:[phone]  Additional Information: Patient request to be seen sooner than scheduled, 10/14/22 if possible.   Thank you,  GH

## 2022-10-13 NOTE — TELEPHONE ENCOUNTER
Spoke with pt and notified her Dr. Zuñiga is not here on Fridays and does not have anything sooner than her scheduled appt. Offered an appt with [facility] and pt declined, she will keep her appt for Wednesday with Dr. Zuñiga.

## 2022-10-19 ENCOUNTER — HOSPITAL ENCOUNTER (OUTPATIENT)
Dept: RADIOLOGY | Facility: HOSPITAL | Age: 62
Discharge: HOME | End: 2022-10-19
Attending: FAMILY MEDICINE
Payer: COMMERCIAL

## 2022-10-19 ENCOUNTER — OFFICE VISIT (OUTPATIENT)
Dept: INTERNAL MEDICINE | Facility: CLINIC | Age: 62
End: 2022-10-19
Payer: COMMERCIAL

## 2022-10-19 VITALS
OXYGEN SATURATION: 96 % | HEART RATE: 96 BPM | SYSTOLIC BLOOD PRESSURE: 122 MMHG | BODY MASS INDEX: 25.12 KG/M2 | HEIGHT: 72 IN | DIASTOLIC BLOOD PRESSURE: 88 MMHG | RESPIRATION RATE: 16 BRPM | WEIGHT: 185.44 LBS | TEMPERATURE: 100 F

## 2022-10-19 DIAGNOSIS — H91.92 HEARING LOSS OF LEFT EAR, UNSPECIFIED HEARING LOSS TYPE: ICD-10-CM

## 2022-10-19 DIAGNOSIS — R06.02 SOB (SHORTNESS OF BREATH): ICD-10-CM

## 2022-10-19 DIAGNOSIS — L50.9 URTICARIA: ICD-10-CM

## 2022-10-19 DIAGNOSIS — R60.0 EDEMA OF EXTREMITIES: ICD-10-CM

## 2022-10-19 DIAGNOSIS — L98.9 SKIN LESIONS: ICD-10-CM

## 2022-10-19 DIAGNOSIS — J30.89 NON-SEASONAL ALLERGIC RHINITIS, UNSPECIFIED TRIGGER: ICD-10-CM

## 2022-10-19 DIAGNOSIS — L30.9 DERMATITIS: ICD-10-CM

## 2022-10-19 DIAGNOSIS — E55.9 VITAMIN D DEFICIENCY DISEASE: ICD-10-CM

## 2022-10-19 DIAGNOSIS — I10 PRIMARY HYPERTENSION: ICD-10-CM

## 2022-10-19 DIAGNOSIS — Z00.00 ANNUAL PHYSICAL EXAM: Primary | ICD-10-CM

## 2022-10-19 PROCEDURE — 99214 OFFICE O/P EST MOD 30 MIN: CPT | Mod: S$GLB,,, | Performed by: FAMILY MEDICINE

## 2022-10-19 PROCEDURE — 99999 PR PBB SHADOW E&M-EST. PATIENT-LVL V: CPT | Mod: PBBFAC,,, | Performed by: FAMILY MEDICINE

## 2022-10-19 PROCEDURE — 71046 XR CHEST PA AND LATERAL: ICD-10-PCS | Mod: 26,,, | Performed by: RADIOLOGY

## 2022-10-19 PROCEDURE — 71046 X-RAY EXAM CHEST 2 VIEWS: CPT | Mod: TC

## 2022-10-19 PROCEDURE — 71046 X-RAY EXAM CHEST 2 VIEWS: CPT | Mod: 26,,, | Performed by: RADIOLOGY

## 2022-10-19 PROCEDURE — 99214 PR OFFICE/OUTPT VISIT, EST, LEVL IV, 30-39 MIN: ICD-10-PCS | Mod: S$GLB,,, | Performed by: FAMILY MEDICINE

## 2022-10-19 PROCEDURE — 99999 PR PBB SHADOW E&M-EST. PATIENT-LVL V: ICD-10-PCS | Mod: PBBFAC,,, | Performed by: FAMILY MEDICINE

## 2022-10-19 RX ORDER — MUPIROCIN 20 MG/G
OINTMENT TOPICAL
Qty: 22 G | Refills: 0 | Status: SHIPPED | OUTPATIENT
Start: 2022-10-19 | End: 2022-11-15 | Stop reason: SDUPTHER

## 2022-10-19 RX ORDER — BACITRACIN 500 UNIT/G
OINTMENT (GRAM) TOPICAL 2 TIMES DAILY
COMMUNITY

## 2022-10-19 RX ORDER — LEVOCETIRIZINE DIHYDROCHLORIDE 5 MG/1
5 TABLET, FILM COATED ORAL DAILY
Qty: 90 TABLET | Refills: 3 | Status: SHIPPED | OUTPATIENT
Start: 2022-10-19 | End: 2022-11-23 | Stop reason: SDUPTHER

## 2022-10-19 RX ORDER — TRIAMTERENE/HYDROCHLOROTHIAZID 37.5-25 MG
1 TABLET ORAL DAILY
Qty: 90 TABLET | Refills: 3 | Status: SHIPPED | OUTPATIENT
Start: 2022-10-19 | End: 2023-10-31

## 2022-10-19 NOTE — PROGRESS NOTES
Subjective:       Patient ID: Shahzad Feliz is a 62 y.o. female.    Chief Complaint: Annual Exam    Annual exam.  She has highs mostly at nighttime.  She is working on diet adjustments to see if this is related.  She has estrogen deficiency but gynecology recommended to discontinue taking estrogen due to potential side effects, she has a lesion of her leg that she would like dermatology evaluation.  She has decreased hearing left ear would like ENT referral she is no longer taking Lipitor.  She is using niacin for lipid control.  She has a vitamin-D deficiency.  She wants refill medications including blood pressure and allergy.    Review of Systems   Constitutional:  Negative for appetite change, fatigue and unexpected weight change.   HENT:  Positive for hearing loss. Negative for congestion.    Respiratory:  Positive for shortness of breath. Negative for cough, chest tightness and wheezing.         She denies cough wheezing.  She feels shortness of breath is related to lack of exercise   Cardiovascular:  Negative for chest pain, palpitations and leg swelling.   Gastrointestinal:  Negative for abdominal distention, abdominal pain, nausea and vomiting.   Genitourinary:  Negative for difficulty urinating, dysuria, frequency, hematuria and urgency.   Neurological:  Negative for dizziness, weakness, light-headedness and headaches.     Objective:      Physical Exam  Constitutional:       General: She is not in acute distress.     Appearance: She is not ill-appearing or diaphoretic.   HENT:      Right Ear: Tympanic membrane and ear canal normal.      Left Ear: Tympanic membrane and ear canal normal.      Nose: Nose normal.   Eyes:      Conjunctiva/sclera: Conjunctivae normal.   Neck:      Comments: Normal thyroid 2+ carotids  Cardiovascular:      Rate and Rhythm: Normal rate and regular rhythm.      Heart sounds: No murmur heard.    No gallop.   Pulmonary:      Effort: Pulmonary effort is normal. No respiratory  distress.      Breath sounds: No wheezing, rhonchi or rales.   Abdominal:      General: There is no distension.      Palpations: Abdomen is soft. There is no mass.      Tenderness: There is no abdominal tenderness.   Lymphadenopathy:      Cervical: No cervical adenopathy.   Skin:     General: Skin is warm and dry.      Coloration: Skin is not pale.      Findings: Lesion present. No erythema.   Neurological:      Mental Status: She is alert and oriented to person, place, and time.   Psychiatric:         Mood and Affect: Mood normal.         Behavior: Behavior normal.         Thought Content: Thought content normal.         Judgment: Judgment normal.       Lab Visit on 11/19/2021   Component Date Value Ref Range Status    Cholesterol 11/19/2021 235 (H)  120 - 199 mg/dL Final    Triglycerides 11/19/2021 189 (H)  30 - 150 mg/dL Final    HDL 11/19/2021 42  40 - 75 mg/dL Final    LDL Cholesterol 11/19/2021 155.2  63.0 - 159.0 mg/dL Final    HDL/Cholesterol Ratio 11/19/2021 17.9 (L)  20.0 - 50.0 % Final    Total Cholesterol/HDL Ratio 11/19/2021 5.6 (H)  2.0 - 5.0 Final    Non-HDL Cholesterol 11/19/2021 193  mg/dL Final    Vit D, 25-Hydroxy 11/19/2021 81  30 - 96 ng/mL Final    TSH 11/19/2021 0.087 (L)  0.400 - 4.000 uIU/mL Final    Free T4 11/19/2021 0.87  0.71 - 1.51 ng/dL Final     Assessment:       1. Annual physical exam    2. Non-seasonal allergic rhinitis, unspecified trigger    3. Edema of extremities    4. Dermatitis    5. Primary hypertension    6. SOB (shortness of breath)    7. Vitamin D deficiency disease    8. Hearing loss of left ear, unspecified hearing loss type    9. Skin lesions    10. Urticaria          Plan:     Blood pressure controlled lab in chest x-ray ordered refer to ENT refer to dermatology if shortness of breath continues or worsens with physical activity let me know for pulmonary referral.    Annual physical exam    Non-seasonal allergic rhinitis, unspecified trigger  -     levocetirizine  (XYZAL) 5 MG tablet; Take 1 tablet (5 mg total) by mouth once daily.  Dispense: 90 tablet; Refill: 3    Edema of extremities  -     triamterene-hydrochlorothiazide 37.5-25 mg (MAXZIDE-25) 37.5-25 mg per tablet; Take 1 tablet by mouth once daily.  Dispense: 90 tablet; Refill: 3    Dermatitis  -     mupirocin (BACTROBAN) 2 % ointment; APPLY EXTERNALLY TO THE AFFECTED AREA THREE TIMES DAILY  Dispense: 22 g; Refill: 0    Primary hypertension  -     CBC Auto Differential; Future; Expected date: 10/19/2022  -     Urinalysis; Future; Expected date: 10/19/2022  -     Comprehensive Metabolic Panel; Future; Expected date: 10/19/2022  -     Lipid Panel; Future; Expected date: 10/19/2022  -     TSH; Future; Expected date: 10/19/2022    SOB (shortness of breath)  -     X-Ray Chest PA And Lateral; Future; Expected date: 10/19/2022    Vitamin D deficiency disease  -     Vitamin D; Future; Expected date: 10/19/2022    Hearing loss of left ear, unspecified hearing loss type  -     Ambulatory referral/consult to ENT; Future; Expected date: 10/26/2022    Skin lesions  -     Ambulatory referral/consult to Dermatology; Future; Expected date: 10/26/2022    Urticaria

## 2022-10-19 NOTE — LETTER
October 19, 2022    Shahzad Feliz  [address] [facility] [id number]             [facility]  [id number] [place] [id number]  Phone: [phone]  Fax: [phone] To Whom It May Concern:    Ms. Feliz was seen in our office today for her annual wellness exam (physical).    If you have any questions or concerns, please don't hesitate to call.    Sincerely,        MD Kathy Saez LPN

## 2022-10-20 DIAGNOSIS — E78.5 HYPERLIPIDEMIA, UNSPECIFIED HYPERLIPIDEMIA TYPE: Primary | ICD-10-CM

## 2022-10-20 DIAGNOSIS — K63.5 POLYP OF COLON, UNSPECIFIED PART OF COLON, UNSPECIFIED TYPE: Primary | ICD-10-CM

## 2022-10-20 RX ORDER — ATORVASTATIN CALCIUM 20 MG/1
20 TABLET, FILM COATED ORAL DAILY
Qty: 90 TABLET | Refills: 0 | Status: SHIPPED | OUTPATIENT
Start: 2022-10-20 | End: 2023-10-18

## 2022-10-25 ENCOUNTER — HOSPITAL ENCOUNTER (OUTPATIENT)
Dept: PREADMISSION TESTING | Facility: HOSPITAL | Age: 62
Discharge: HOME | End: 2022-10-25
Payer: COMMERCIAL

## 2022-10-25 DIAGNOSIS — K63.5 POLYP OF COLON, UNSPECIFIED PART OF COLON, UNSPECIFIED TYPE: Primary | ICD-10-CM

## 2022-10-25 RX ORDER — SOD SULF/POT CHLORIDE/MAG SULF 1.479 G
12 TABLET ORAL DAILY
Qty: 24 TABLET | Refills: 0 | Status: ON HOLD | OUTPATIENT
Start: 2022-10-25 | End: 2022-12-16 | Stop reason: HOSPADM

## 2022-11-15 ENCOUNTER — OFFICE VISIT (OUTPATIENT)
Dept: OTOLARYNGOLOGY | Facility: CLINIC | Age: 62
End: 2022-11-15
Payer: COMMERCIAL

## 2022-11-15 ENCOUNTER — CLINICAL SUPPORT (OUTPATIENT)
Dept: AUDIOLOGY | Facility: CLINIC | Age: 62
End: 2022-11-15
Payer: COMMERCIAL

## 2022-11-15 ENCOUNTER — OFFICE VISIT (OUTPATIENT)
Dept: DERMATOLOGY | Facility: CLINIC | Age: 62
End: 2022-11-15
Payer: COMMERCIAL

## 2022-11-15 DIAGNOSIS — L81.1 MELASMA: ICD-10-CM

## 2022-11-15 DIAGNOSIS — L82.1 SEBORRHEIC KERATOSES: ICD-10-CM

## 2022-11-15 DIAGNOSIS — H90.5 HEARING LOSS, SENSORINEURAL, COMBINED TYPES: ICD-10-CM

## 2022-11-15 DIAGNOSIS — H92.02 LEFT EAR PAIN: Primary | ICD-10-CM

## 2022-11-15 DIAGNOSIS — L85.3 XEROSIS CUTIS: Primary | ICD-10-CM

## 2022-11-15 DIAGNOSIS — T78.40XA ALLERGY, INITIAL ENCOUNTER: ICD-10-CM

## 2022-11-15 DIAGNOSIS — H90.3 SENSORINEURAL HEARING LOSS (SNHL) OF BOTH EARS: ICD-10-CM

## 2022-11-15 DIAGNOSIS — L30.9 DERMATITIS: ICD-10-CM

## 2022-11-15 PROCEDURE — 99203 PR OFFICE/OUTPT VISIT, NEW, LEVL III, 30-44 MIN: ICD-10-PCS | Mod: S$GLB,,, | Performed by: PHYSICIAN ASSISTANT

## 2022-11-15 PROCEDURE — 99204 OFFICE O/P NEW MOD 45 MIN: CPT | Mod: S$GLB,,, | Performed by: STUDENT IN AN ORGANIZED HEALTH CARE EDUCATION/TRAINING PROGRAM

## 2022-11-15 PROCEDURE — 99999 PR PBB SHADOW E&M-EST. PATIENT-LVL III: CPT | Mod: PBBFAC,,, | Performed by: PHYSICIAN ASSISTANT

## 2022-11-15 PROCEDURE — 99203 OFFICE O/P NEW LOW 30 MIN: CPT | Mod: S$GLB,,, | Performed by: PHYSICIAN ASSISTANT

## 2022-11-15 PROCEDURE — 99204 PR OFFICE/OUTPT VISIT, NEW, LEVL IV, 45-59 MIN: ICD-10-PCS | Mod: S$GLB,,, | Performed by: STUDENT IN AN ORGANIZED HEALTH CARE EDUCATION/TRAINING PROGRAM

## 2022-11-15 PROCEDURE — 92567 PR TYMPA2METRY: ICD-10-PCS | Mod: S$GLB,,, | Performed by: AUDIOLOGIST

## 2022-11-15 PROCEDURE — 99999 PR PBB SHADOW E&M-EST. PATIENT-LVL III: ICD-10-PCS | Mod: PBBFAC,,, | Performed by: STUDENT IN AN ORGANIZED HEALTH CARE EDUCATION/TRAINING PROGRAM

## 2022-11-15 PROCEDURE — 99999 PR PBB SHADOW E&M-EST. PATIENT-LVL II: CPT | Mod: PBBFAC,,, | Performed by: AUDIOLOGIST

## 2022-11-15 PROCEDURE — 99999 PR PBB SHADOW E&M-EST. PATIENT-LVL III: CPT | Mod: PBBFAC,,, | Performed by: STUDENT IN AN ORGANIZED HEALTH CARE EDUCATION/TRAINING PROGRAM

## 2022-11-15 PROCEDURE — 99999 PR PBB SHADOW E&M-EST. PATIENT-LVL II: ICD-10-PCS | Mod: PBBFAC,,, | Performed by: AUDIOLOGIST

## 2022-11-15 PROCEDURE — 92567 TYMPANOMETRY: CPT | Mod: S$GLB,,, | Performed by: AUDIOLOGIST

## 2022-11-15 PROCEDURE — 92557 COMPREHENSIVE HEARING TEST: CPT | Mod: S$GLB,,, | Performed by: AUDIOLOGIST

## 2022-11-15 PROCEDURE — 99999 PR PBB SHADOW E&M-EST. PATIENT-LVL III: ICD-10-PCS | Mod: PBBFAC,,, | Performed by: PHYSICIAN ASSISTANT

## 2022-11-15 PROCEDURE — 92557 PR COMPREHENSIVE HEARING TEST: ICD-10-PCS | Mod: S$GLB,,, | Performed by: AUDIOLOGIST

## 2022-11-15 RX ORDER — HYDROQUINONE 40 MG/G
CREAM TOPICAL NIGHTLY
Qty: 30 G | Refills: 0 | Status: ON HOLD | OUTPATIENT
Start: 2022-11-15 | End: 2022-12-16 | Stop reason: HOSPADM

## 2022-11-15 RX ORDER — MUPIROCIN 20 MG/G
OINTMENT TOPICAL
Qty: 22 G | Refills: 5 | Status: SHIPPED | OUTPATIENT
Start: 2022-11-15

## 2022-11-15 NOTE — PROGRESS NOTES
Subjective:   Patient ID: Shahzad Feliz is a 62 y.o. female.    Chief Complaint: Hearing Loss (Complain of left ear, right ear actually worse according to audiogram )    Shahzad Feliz was seen today with complaints of left ear/jaw pain, pressure and decreased hearing. She had a recent flight and still feels stopped up. 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 Her last audiogram through work was 4 years ago and showed decreased hearing at that time.    Review of patient's allergies indicates:   Allergen Reactions    Wasp venom Swelling    Grass pollen-june grass standard Hives and Itching    Latex, natural rubber     Pcn [penicillins] Other (See Comments)     Passed out and seizures           Review of Systems   Constitutional:  Negative for chills, fatigue, fever and unexpected weight change.   HENT:  Positive for ear pain. Negative for congestion, dental problem, ear discharge, facial swelling, hearing loss, nosebleeds, postnasal drip, rhinorrhea, sinus pressure, sneezing, sore throat, tinnitus, trouble swallowing and voice change.    Eyes:  Negative for redness, itching and visual disturbance.   Respiratory:  Negative for cough, choking, shortness of breath and wheezing.    Cardiovascular:  Negative for chest pain and palpitations.   Gastrointestinal:  Negative for abdominal pain.        No reflux.   Musculoskeletal:  Negative for gait problem.   Skin:  Negative for rash.   Neurological:  Negative for dizziness, light-headedness and headaches.       Objective:   There were no vitals taken for this visit.    Physical Exam  Constitutional:       General: She is not in acute distress.     Appearance: She is well-developed.   HENT:      Head: Normocephalic and atraumatic.      Right Ear: Tympanic membrane, ear canal and external ear normal.      Left Ear: Tympanic membrane, ear canal and external ear normal.      Nose: Nose normal. No nasal deformity, septal deviation,  mucosal edema or rhinorrhea.      Right Sinus: No maxillary sinus tenderness or frontal sinus tenderness.      Left Sinus: No maxillary sinus tenderness or frontal sinus tenderness.      Mouth/Throat:      Mouth: Mucous membranes are not pale and not dry.      Dentition: No dental caries.      Pharynx: Uvula midline. No oropharyngeal exudate or posterior oropharyngeal erythema.   Eyes:      General: Lids are normal. No scleral icterus.     Extraocular Movements:      Right eye: Normal extraocular motion and no nystagmus.      Left eye: Normal extraocular motion and no nystagmus.      Conjunctiva/sclera: Conjunctivae normal.      Right eye: Right conjunctiva is not injected. No chemosis.     Left eye: Left conjunctiva is not injected. No chemosis.     Pupils: Pupils are equal, round, and reactive to light.   Neck:      Thyroid: No thyroid mass or thyromegaly.      Trachea: Trachea and phonation normal. No tracheal tenderness or tracheal deviation.   Pulmonary:      Effort: Pulmonary effort is normal. No respiratory distress.      Breath sounds: No stridor.   Abdominal:      General: There is no distension.   Lymphadenopathy:      Head:      Right side of head: No submental, submandibular, preauricular, posterior auricular or occipital adenopathy.      Left side of head: No submental, submandibular, preauricular, posterior auricular or occipital adenopathy.      Cervical: No cervical adenopathy.   Skin:     General: Skin is warm and dry.      Findings: No erythema or rash.   Neurological:      Mental Status: She is alert and oriented to person, place, and time.      Cranial Nerves: No cranial nerve deficit.   Psychiatric:         Behavior: Behavior normal.        Imaging :                Assessment:     1. Left ear pain    2. Sensorineural hearing loss (SNHL) of both ears        Plan:     Left ear pain    Sensorineural hearing loss (SNHL) of both ears      Her ear exam was normal. She is c/o pain after flying we  discussed that she may have experienced some ETD.    We had a long discussion regarding the anatomy and function of the eustachian tube.  We discussed that the eustachian tube acts as a pump to keep the appropriate amount of pressure behind the ear drum.  I gave the patient a prescription for a nasal steroid spray to be used on a daily basis, and we discussed that it will take 2-3 weeks of daily use to achieve maximal effectiveness.

## 2022-11-15 NOTE — PROGRESS NOTES
Patient Information  Name: Shahzad Feliz  : 1960  MRN: [id number]     Referring Physician:  Dr. Zuñiga   Primary Care Physician:  Dr. Santi Zuñiga MD   Date of Visit: 11/15/2022      Subjective:       Shahzad Feliz is a 62 y.o. female who presents for   Chief Complaint   Patient presents with    Lesion     On arms and legs. Sx raised and itches.     Dry Skin     X years. Tx none     Skin Discoloration     X years. Tx none      HPI  Patient with new complaint of lesion(s)  Location: left arm, right leg  Duration: years  Symptoms: itches  Relieving factors/Previous treatments: none    Patient with new complaint of lesion(s)  Location: full body  Duration: years  Symptoms: dry skin, itchy   Relieving factors/Previous treatments: OTC products    She also would like refill of bactroban.    She is currently using Dove for sensitive skin.    Patient with new complaint of lesion(s)  Location: face  Duration: years  Symptoms: discoloration  Relieving factors/Previous treatments: none    Patient was last seen:Visit date not found     Prior notes by myself reviewed.   Clinical documentation obtained by nursing staff reviewed.    Review of Systems   Skin:  Positive for itching and dry skin. Negative for rash.      Objective:    Physical Exam   Constitutional: She appears well-developed and well-nourished. No distress.   Neurological: She is alert and oriented to person, place, and time. She is not disoriented.   Psychiatric: She has a normal mood and affect.   Skin:   Areas Examined (abnormalities noted in diagram):   Head / Face Inspection Performed  Neck Inspection Performed  RUE Inspected  LUE Inspection Performed  RLE Inspected  LLE Inspection Performed            Diagram Legend     Erythematous scaling macule/papule c/w actinic keratosis       Vascular papule c/w angioma      Pigmented verrucoid papule/plaque c/w seborrheic keratosis      Yellow umbilicated papule c/w sebaceous hyperplasia       Irregularly shaped tan macule c/w lentigo     1-2 mm smooth white papules consistent with Milia      Movable subcutaneous cyst with punctum c/w epidermal inclusion cyst      Subcutaneous movable cyst c/w pilar cyst      Firm pink to brown papule c/w dermatofibroma      Pedunculated fleshy papule(s) c/w skin tag(s)      Evenly pigmented macule c/w junctional nevus     Mildly variegated pigmented, slightly irregular-bordered macule c/w mildly atypical nevus      Flesh colored to evenly pigmented papule c/w intradermal nevus       Pink pearly papule/plaque c/w basal cell carcinoma      Erythematous hyperkeratotic cursted plaque c/w SCC      Surgical scar with no sign of skin cancer recurrence      Open and closed comedones      Inflammatory papules and pustules      Verrucoid papule consistent consistent with wart     Erythematous eczematous patches and plaques     Dystrophic onycholytic nail with subungual debris c/w onychomycosis     Umbilicated papule    Erythematous-base heme-crusted tan verrucoid plaque consistent with inflamed seborrheic keratosis     Erythematous Silvery Scaling Plaque c/w Psoriasis     See annotation      No images are attached to the encounter or orders placed in the encounter.    [] Data reviewed  [] Independent review of test  [] Management discussed with another provider    Assessment / Plan:        Xerosis cutis  - Dry skin care discussed  - Recommend All-free and clear  - Recommend vanicream    Seborrheic keratoses  These are benign inherited growths without a malignant potential. Reassurance given to patient. No treatment is necessary.     Dermatitis  -     mupirocin (BACTROBAN) 2 % ointment; APPLY EXTERNALLY TO THE AFFECTED AREA THREE TIMES DAILY  Dispense: 22 g; Refill: 5    Melasma  -     hydroquinone 4 % Crea; Apply topically nightly. Use for 6 weeks only.  Dispense: 30 g; Refill: 0  - Discussed sunscreen use at least SPF 30           LOS NUMBER AND COMPLEXITY OF PROBLEMS     COMPLEXITY OF DATA RISK TOTAL TIME (m)   76266  08032 [] 1 self-limited or minor problem [x] Minimal to none [] No treatment recommended or patient to monitor 15-29  10-19   09228  51731 Low  [] 2 or > self limited or minor problems  [] 1 stable chronic illness  [] 1 acute, uncomplicated illness or injury Limited (2)  [] Prior external notes from each unique source  [] Review result of each unique test  [] Order each unique test []  Low  OTC medications, minor skin biopsy 30-44  20-29   74539  05461 Moderate  [x]  1 or > chronic illness with progression, exacerbation or SE of treatment  []  2 or more stable chronic illnesses  []  1 acute illness with systemic symptoms  []  1 acute complicated injury  []  1 undiagnosed new problem with uncertain prognosis Moderate (1/3 below)  []  3 or more data items        *Now includes assessment requiring independent historian  []  Independent interpretation of a test  []  Discuss management/test with another provider Moderate  [x]  Prescription drug mgmt  []  Minor surgery with risk discussed  []  Mgmt limited by social determinates 45-59  30-39   28725  40832 High  []  1 or more chronic illness with severe exacerbation, progression or SE of treatment  []  1 acute or chronic illness/injury that poses a threat to life or bodily function Extensive (2/3 below)  []  3 or more data items        *Now includes assessment requiring independent historian.  []  Independent interpretation of a test  []  Discuss management/test with another provider High  []  Major surgery with risk discussed  []  Drug therapy requiring intensive monitoring for toxicity  []  Hospitalization  []  Decision for DNR 60-74  40-54      No follow-ups on file.    Paulina García MD, FAAD  [facility]

## 2022-11-15 NOTE — PROGRESS NOTES
Shahzad Feliz was seen 11/15/2022 for an audiological evaluation.  Patient complains of left ear/jaw pain, pressure and decreased hearing. She had a recent flight and still feels stopped up. 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 Her last audiogram through work was 4 years ago and showed decreased hearing at that time.    Results reveal a mild-to-moderate sensorineural hearing loss 6291-5322 and 0782-6529 Hz for the right ear, and  normal hearing 250-8000 Hz for the left ear.   Speech Reception Thresholds were  20 dBHL for the right ear and 15 dBHL for the left ear.   Word recognition scores were excellent for the right ear and excellent for the left ear.   Tympanograms were Type A, normal for the right ear and Type A, normal for the left ear.    Patient was counseled on the above findings.     Recommendations include:    1.  ENT followup  2.  Hearing aid consult for right ear when ready  3.  Wear hearing protective devices around loud noise  4.  Annual audiograms            Fluconazole Pregnancy And Lactation Text: This medication is Pregnancy Category C and it isn't know if it is safe during pregnancy. It is also excreted in breast milk.

## 2022-11-16 RX ORDER — FLUTICASONE PROPIONATE 50 MCG
2 SPRAY, SUSPENSION (ML) NASAL DAILY
Qty: 18.2 ML | Refills: 6 | Status: SHIPPED | OUTPATIENT
Start: 2022-11-16 | End: 2023-05-17

## 2022-11-23 ENCOUNTER — LAB VISIT (OUTPATIENT)
Dept: LAB | Facility: HOSPITAL | Age: 62
End: 2022-11-23
Attending: FAMILY MEDICINE
Payer: COMMERCIAL

## 2022-11-23 ENCOUNTER — OFFICE VISIT (OUTPATIENT)
Dept: ALLERGY | Facility: CLINIC | Age: 62
End: 2022-11-23
Payer: COMMERCIAL

## 2022-11-23 VITALS
DIASTOLIC BLOOD PRESSURE: 84 MMHG | TEMPERATURE: 97 F | WEIGHT: 181.88 LBS | HEART RATE: 81 BPM | HEIGHT: 71 IN | BODY MASS INDEX: 25.46 KG/M2 | SYSTOLIC BLOOD PRESSURE: 151 MMHG

## 2022-11-23 DIAGNOSIS — Z91.018 FOOD ALLERGY: ICD-10-CM

## 2022-11-23 DIAGNOSIS — R03.0 ELEVATED BLOOD PRESSURE READING: ICD-10-CM

## 2022-11-23 DIAGNOSIS — J31.0 RHINITIS, UNSPECIFIED TYPE: ICD-10-CM

## 2022-11-23 DIAGNOSIS — L50.3 DERMOGRAPHIA: ICD-10-CM

## 2022-11-23 DIAGNOSIS — L50.1 CHRONIC IDIOPATHIC URTICARIA: ICD-10-CM

## 2022-11-23 DIAGNOSIS — L30.9 DERMATITIS: ICD-10-CM

## 2022-11-23 DIAGNOSIS — Z88.0 PENICILLIN ALLERGY: ICD-10-CM

## 2022-11-23 DIAGNOSIS — Z91.018 FOOD ALLERGY: Primary | ICD-10-CM

## 2022-11-23 DIAGNOSIS — Z87.891 EX-SMOKER: ICD-10-CM

## 2022-11-23 DIAGNOSIS — T78.40XA ALLERGY, INITIAL ENCOUNTER: ICD-10-CM

## 2022-11-23 LAB — IGE SERPL-ACNC: 50 IU/ML (ref 0–100)

## 2022-11-23 PROCEDURE — 82785 ASSAY OF IGE: CPT | Performed by: ALLERGY & IMMUNOLOGY

## 2022-11-23 PROCEDURE — 83520 IMMUNOASSAY QUANT NOS NONAB: CPT | Performed by: ALLERGY & IMMUNOLOGY

## 2022-11-23 PROCEDURE — 86682 HELMINTH ANTIBODY: CPT | Mod: 91 | Performed by: ALLERGY & IMMUNOLOGY

## 2022-11-23 PROCEDURE — 84165 PROTEIN E-PHORESIS SERUM: CPT | Performed by: ALLERGY & IMMUNOLOGY

## 2022-11-23 PROCEDURE — 80053 COMPREHEN METABOLIC PANEL: CPT | Performed by: ALLERGY & IMMUNOLOGY

## 2022-11-23 PROCEDURE — 86038 ANTINUCLEAR ANTIBODIES: CPT | Performed by: ALLERGY & IMMUNOLOGY

## 2022-11-23 PROCEDURE — 36415 COLL VENOUS BLD VENIPUNCTURE: CPT | Performed by: ALLERGY & IMMUNOLOGY

## 2022-11-23 PROCEDURE — 99205 OFFICE O/P NEW HI 60 MIN: CPT | Mod: S$GLB,,, | Performed by: ALLERGY & IMMUNOLOGY

## 2022-11-23 PROCEDURE — 99205 PR OFFICE/OUTPT VISIT, NEW, LEVL V, 60-74 MIN: ICD-10-PCS | Mod: S$GLB,,, | Performed by: ALLERGY & IMMUNOLOGY

## 2022-11-23 PROCEDURE — 85025 COMPLETE CBC W/AUTO DIFF WBC: CPT | Performed by: ALLERGY & IMMUNOLOGY

## 2022-11-23 PROCEDURE — 84165 PROTEIN E-PHORESIS SERUM: CPT | Mod: 26,,, | Performed by: PATHOLOGY

## 2022-11-23 PROCEDURE — 86376 MICROSOMAL ANTIBODY EACH: CPT | Performed by: ALLERGY & IMMUNOLOGY

## 2022-11-23 PROCEDURE — 86592 SYPHILIS TEST NON-TREP QUAL: CPT | Performed by: ALLERGY & IMMUNOLOGY

## 2022-11-23 PROCEDURE — 86003 ALLG SPEC IGE CRUDE XTRC EA: CPT | Performed by: ALLERGY & IMMUNOLOGY

## 2022-11-23 PROCEDURE — 86705 HEP B CORE ANTIBODY IGM: CPT | Performed by: ALLERGY & IMMUNOLOGY

## 2022-11-23 PROCEDURE — 99999 PR PBB SHADOW E&M-EST. PATIENT-LVL V: ICD-10-PCS | Mod: PBBFAC,,, | Performed by: ALLERGY & IMMUNOLOGY

## 2022-11-23 PROCEDURE — 86003 ALLG SPEC IGE CRUDE XTRC EA: CPT | Mod: 59 | Performed by: ALLERGY & IMMUNOLOGY

## 2022-11-23 PROCEDURE — 86803 HEPATITIS C AB TEST: CPT | Performed by: ALLERGY & IMMUNOLOGY

## 2022-11-23 PROCEDURE — 99999 PR PBB SHADOW E&M-EST. PATIENT-LVL V: CPT | Mod: PBBFAC,,, | Performed by: ALLERGY & IMMUNOLOGY

## 2022-11-23 PROCEDURE — 84165 PATHOLOGIST INTERPRETATION SPE: ICD-10-PCS | Mod: 26,,, | Performed by: PATHOLOGY

## 2022-11-23 PROCEDURE — 84443 ASSAY THYROID STIM HORMONE: CPT | Performed by: ALLERGY & IMMUNOLOGY

## 2022-11-23 PROCEDURE — 86682 HELMINTH ANTIBODY: CPT | Performed by: ALLERGY & IMMUNOLOGY

## 2022-11-23 RX ORDER — MUPIROCIN CALCIUM 20 MG/G
CREAM TOPICAL
COMMUNITY
End: 2023-01-25

## 2022-11-23 RX ORDER — TRIAMCINOLONE ACETONIDE 1 MG/G
CREAM TOPICAL 2 TIMES DAILY
Qty: 80 G | Refills: 2 | Status: SHIPPED | OUTPATIENT
Start: 2022-11-23 | End: 2023-11-15

## 2022-11-23 RX ORDER — FAMOTIDINE 20 MG/1
20 TABLET, FILM COATED ORAL 2 TIMES DAILY
Qty: 60 TABLET | Refills: 11 | Status: SHIPPED | OUTPATIENT
Start: 2022-11-23 | End: 2023-01-25

## 2022-11-23 RX ORDER — LEVOCETIRIZINE DIHYDROCHLORIDE 5 MG/1
5 TABLET, FILM COATED ORAL NIGHTLY
Qty: 30 TABLET | Refills: 11 | Status: SHIPPED | OUTPATIENT
Start: 2022-11-23 | End: 2023-01-25

## 2022-11-23 RX ORDER — EPINEPHRINE 0.3 MG/.3ML
1 INJECTION SUBCUTANEOUS ONCE
Qty: 2 EACH | Refills: 3 | Status: SHIPPED | OUTPATIENT
Start: 2022-11-23 | End: 2024-02-20

## 2022-11-23 RX ORDER — LEVOCETIRIZINE DIHYDROCHLORIDE 5 MG/1
5 TABLET, FILM COATED ORAL DAILY
Qty: 90 TABLET | Refills: 3 | Status: SHIPPED | OUTPATIENT
Start: 2022-11-23 | End: 2023-12-14

## 2022-11-23 RX ORDER — AZELASTINE 1 MG/ML
SPRAY, METERED NASAL
COMMUNITY
End: 2023-10-18 | Stop reason: SDUPTHER

## 2022-11-23 NOTE — PROGRESS NOTES
"Subjective:       Patient ID: Shahzad Feliz is a 62 y.o. female.    Chief Complaint:  Allergies      HPI: 62 year old female referred by Dr. García for allergies  Symptoms_  "outbreaks on my back"  Hives- worse at night  Triggers- sweating cause her to itch and hives  Hives- arms and back  Last less than 24 hours  Raised erythema that itches  She places alcohol and bactriban on the area after the wipes the sweat.  She takes levocetirizine- daily, not helping  She denies tongue or throat swelling.  Duration- several years      Seafood- shrimp and crab- acne on face, itchy eyes  Fish does not cause symptoms;    Runny nose intermittently    Pcn- "blacked out"- 14 years of age  When I left the hospital they told me "you are allergic to all antibiotics".    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    Stopped smoking 12 years ago- 30 years- 1 pack   smokes.    Past Medical History:   Diagnosis Date    Bone spur     disc    Bulging disc     Hypertension         Family History:  Daughter has allergic rhinitis      Current Outpatient Medications on File Prior to Visit   Medication Sig Dispense Refill    ASCORBIC ACID (WILLIAM-C ORAL) Take by mouth.      atorvastatin (LIPITOR) 20 MG tablet Take 1 tablet (20 mg total) by mouth once daily. 90 tablet 0    azelastine (ASTELIN) 137 mcg (0.1 %) nasal spray by Nasal route.      b complex vitamins capsule Take 1 capsule by mouth once daily.      black cohosh 20 mg Tab Take by mouth.      cholecalciferol, vitamin D3, 1,000 unit/spray SpSn Place 2,000 Units under the tongue.       fluticasone propionate (FLONASE) 50 mcg/actuation nasal spray 2 sprays (100 mcg total) by Each Nostril route once daily. 18.2 mL 6    GLUTAMINE ORAL Take by mouth daily as needed (for dyspepsia).      hydroquinone 4 % Crea Apply topically nightly. Use for 6 weeks only. 30 g 0    milk thistle 150 mg Cap Take by mouth 2 (two) " times a day.      multivitamin (THERAGRAN) per tablet Take 1 tablet by mouth once daily.      mupirocin (BACTROBAN) 2 % ointment APPLY EXTERNALLY TO THE AFFECTED AREA THREE TIMES DAILY 22 g 5    mupirocin calcium 2% (BACTROBAN) 2 % cream Place onto the skin.      niacin (SLO-NIACIN) 500 mg tablet Take 500 mg by mouth 2 (two) times daily with meals.      sod sulf-pot chloride-mag sulf (SUTAB) 1.479-0.188- 0.225 gram tablet Take 12 tablets by mouth once daily. Take according to package instructions with indicated amount of water. 24 tablet 0    triamterene-hydrochlorothiazide 37.5-25 mg (MAXZIDE-25) 37.5-25 mg per tablet Take 1 tablet by mouth once daily. 90 tablet 3    TUMERIC-GING-OLIVE-OREG-CAPRYL ORAL Take by mouth.      [DISCONTINUED] levocetirizine (XYZAL) 5 MG tablet Take 1 tablet (5 mg total) by mouth once daily. 90 tablet 3    gabapentin (NEURONTIN) 300 MG capsule Take 1 capsule (300 mg total) by mouth every evening. Take 45 min to 1 hour before bed as may cause drowsiness 30 capsule 1     No current facility-administered medications on file prior to visit.        Review of patient's allergies indicates:   Allergen Reactions    Wasp venom      Large local swelling, no systemic symptoms    Grass pollen-june grass standard Hives and Itching    Latex, natural rubber     Pcn [penicillins] Other (See Comments)     Passed out and seizures   .    Environmental History: Pets in the home: dogs (1).  Tobacco Smoke in Home: no  Review of Systems   Constitutional:  Negative for chills and fever.   HENT:  Positive for congestion and rhinorrhea.    Eyes:  Positive for itching. Negative for discharge.   Respiratory:  Negative for cough, shortness of breath and wheezing.    Cardiovascular:  Positive for leg swelling. Negative for chest pain.   Gastrointestinal:  Negative for nausea and vomiting.   Skin:  Positive for rash. Negative for wound.   Allergic/Immunologic: Positive for environmental allergies and food allergies.    Neurological:  Negative for facial asymmetry and speech difficulty.   Hematological:  Negative for adenopathy. Bruises/bleeds easily.   Psychiatric/Behavioral:  Negative for behavioral problems and suicidal ideas.       Objective:    Physical Exam  Vitals reviewed.   Constitutional:       General: She is not in acute distress.     Appearance: Normal appearance. She is well-developed. She is not ill-appearing, toxic-appearing or diaphoretic.   HENT:      Head: Normocephalic and atraumatic.      Right Ear: Tympanic membrane, ear canal and external ear normal. There is no impacted cerumen.      Left Ear: Tympanic membrane, ear canal and external ear normal. There is no impacted cerumen.      Nose: Nose normal. No congestion or rhinorrhea.      Mouth/Throat:      Pharynx: No oropharyngeal exudate or posterior oropharyngeal erythema.   Eyes:      General: No scleral icterus.        Right eye: No discharge.         Left eye: No discharge.      Pupils: Pupils are equal, round, and reactive to light.   Neck:      Thyroid: No thyromegaly.   Cardiovascular:      Rate and Rhythm: Normal rate and regular rhythm.      Heart sounds: Normal heart sounds. No murmur heard.    No friction rub. No gallop.   Pulmonary:      Effort: Pulmonary effort is normal. No respiratory distress.      Breath sounds: Normal breath sounds. No stridor. No wheezing, rhonchi or rales.   Chest:      Chest wall: No tenderness.   Abdominal:      General: Bowel sounds are normal. There is no distension.      Palpations: Abdomen is soft. There is no mass.      Tenderness: There is no abdominal tenderness. There is no guarding or rebound.      Hernia: No hernia is present.   Musculoskeletal:         General: No swelling, tenderness, deformity or signs of injury. Normal range of motion.      Cervical back: Normal range of motion and neck supple. No rigidity or tenderness. No muscular tenderness.      Right lower leg: No edema.      Left lower leg: No edema.    Lymphadenopathy:      Cervical: No cervical adenopathy.   Skin:     General: Skin is warm.      Coloration: Skin is not jaundiced or pale.      Findings: Rash present. No bruising or erythema.      Comments: Upper back- erythematous raised papular rash- NOT hives   Neurological:      Mental Status: She is alert and oriented to person, place, and time.      Gait: Gait normal.   Psychiatric:         Mood and Affect: Mood normal.         Behavior: Behavior normal.         Thought Content: Thought content normal.         Judgment: Judgment normal.        Unable to skin prick test, as she is dermographic.  Assessment:       1. Food allergy    2. Allergy, initial encounter    3. Rhinitis, unspecified type    4. Penicillin allergy    5. Chronic idiopathic urticaria    6. Ex-smoker    7. Dermographia    8. Elevated blood pressure reading    9. Dermatitis         Plan:       Food allergy  -     Shrimp IgE; Future; Expected date: 11/23/2022  -     Lobster IgE; Future; Expected date: 11/23/2022  -     Crab IgE; Future; Expected date: 11/23/2022  -     Oyster IgE; Future; Expected date: 11/23/2022  -     Clams IgE; Future; Expected date: 11/23/2022  -     EPINEPHrine (EPIPEN) 0.3 mg/0.3 mL AtIn; Inject 0.3 mLs (0.3 mg total) into the muscle once. for 1 dose  Dispense: 2 each; Refill: 3    Allergy, initial encounter  -     Ambulatory referral/consult to Allergy    Rhinitis, unspecified type  -     IgE; Future; Expected date: 11/23/2022  -     Bahia grass IgE; Future; Expected date: 11/23/2022  -     Aspergillus fumagatus IgE; Future; Expected date: 11/23/2022  -     Chaetomium globosum IgE; Future; Expected date: 11/23/2022  -     Cockroach, American IgE; Future; Expected date: 11/23/2022  -     Cladosporium IgE; Future; Expected date: 11/23/2022  -     Curvularia lunata IgE; Future; Expected date: 11/23/2022  -     D. farinae IgE; Future; Expected date: 11/23/2022  -     D. pteronyssinus IgE; Future; Expected date:  11/23/2022  -     Plantain, English IgE; Future; Expected date: 11/23/2022  -     Eucalyptus IgE; Future; Expected date: 11/23/2022  -     Govea elder, rough IgE; Future; Expected date: 11/23/2022  -     Mugwort IgE; Future; Expected date: 11/23/2022  -     Nettle IgE; Future; Expected date: 11/23/2022  -     Orchard grass IgE; Future; Expected date: 11/23/2022  -     [place], western white IgE; Future; Expected date: 11/23/2022  -     Privet, common IgE; Future; Expected date: 11/23/2022  -     Ragweed, short, common IgE; Future; Expected date: 11/23/2022  -     Red top grass IgE; Future; Expected date: 11/23/2022  -     Rye grass, cultivated IgE; Future; Expected date: 11/23/2022  -     Thistle, Russian IgE; Future; Expected date: 11/23/2022  -     Stemphyllium IgE; Future; Expected date: 11/23/2022  -     Abe IgE; Future; Expected date: 11/23/2022  -     Jono grass IgE; Future; Expected date: 11/23/2022  -     Allergen, Pecan Tree IgE; Future; Expected date: 11/23/2022  -     [place], black IgE; Future; Expected date: 11/23/2022  -     [place], bald IgE; Future; Expected date: 11/23/2022  -     Oak, white IgE; Future; Expected date: 11/23/2022  -     Allergen, Cocklebur; Future; Expected date: 11/23/2022  -     Cat epithelium IgE; Future; Expected date: 11/23/2022  -     Allergen, Hackberry Celtis; Future; Expected date: 11/23/2022  -     Allergen, Elm Cedar; Future; Expected date: 11/23/2022  -     Allergen-[place]; Future; Expected date: 11/23/2022  -     RAST Allergen for [place]; Future; Expected date: 11/23/2022  -     RAST Allergen Maple ([place]); Future; Expected date: 11/23/2022  -     Allergen, Meadow Grass ([place]); Future; Expected date: 11/23/2022  -     Allergen-Silver Birch; Future; Expected date: 11/23/2022  -     RAST Allergen [place]; Future; Expected date: 11/23/2022  -     RAST Allergen, Sheep [place]); Future; Expected date: 11/23/2022  -      Allergen-Alternaria Alternata; Future; Expected date: 11/23/2022  -     Allergen-Maple [place]; Future; Expected date: 11/23/2022  -     Allergen, White [place]; Future; Expected date: 11/23/2022  -     levocetirizine (XYZAL) 5 MG tablet; Take 1 tablet (5 mg total) by mouth every evening.  Dispense: 30 tablet; Refill: 11  -     levocetirizine (XYZAL) 5 MG tablet; Take 1 tablet (5 mg total) by mouth once daily.  Dispense: 90 tablet; Refill: 3    Penicillin allergy    Chronic idiopathic urticaria  -     Strongyloides IgG Antibodies; Future; Expected date: 11/23/2022  -     Toxocara antibody; Future; Expected date: 11/23/2022  -     Tryptase; Future; Expected date: 11/23/2022  -     CU (Chronic Urticaria) Index Panel; Future; Expected date: 11/23/2022  -     EDWARD Screen w/Reflex; Future; Expected date: 11/23/2022  -     CBC Auto Differential; Future; Expected date: 11/23/2022  -     Protein Electrophoresis, Serum; Future; Expected date: 11/23/2022  -     Ascaris IgE; Future; Expected date: 11/23/2022  -     RPR; Future; Expected date: 11/23/2022  -     Comprehensive Metabolic Panel; Future; Expected date: 11/23/2022  -     Hepatitis C Antibody; Future; Expected date: 11/23/2022  -     Hepatitis B Core Antibody, IgM; Future; Expected date: 11/23/2022  -     levocetirizine (XYZAL) 5 MG tablet; Take 1 tablet (5 mg total) by mouth once daily.  Dispense: 90 tablet; Refill: 3  -     famotidine (PEPCID) 20 MG tablet; Take 1 tablet (20 mg total) by mouth 2 (two) times daily.  Dispense: 60 tablet; Refill: 11    Ex-smoker    Dermographia    Elevated blood pressure reading    Dermatitis  -     triamcinolone acetonide 0.1% (KENALOG) 0.1 % cream; Apply topically 2 (two) times daily.  Dispense: 80 g; Refill: 2  -     levocetirizine (XYZAL) 5 MG tablet; Take 1 tablet (5 mg total) by mouth once daily.  Dispense: 90 tablet; Refill: 3  -     famotidine (PEPCID) 20 MG tablet; Take 1 tablet (20 mg total) by mouth 2 (two) times daily.   Dispense: 60 tablet; Refill: 11      Discussed cholinergic Urticaria    RTC 4-6 weeks or sooner, if needed     SEBASTIAN DANIELSON spent 63 minutes on this visit.  This includes face to face time and non-face to face time preparing to see the patient (eg, review of tests), obtaining and/or reviewing separately obtained history, documenting clinical information in the electronic or other health record, independently interpreting results and communicating results to the patient/family/caregiver, or care coordinator.     CC: Dr. García

## 2022-11-24 LAB
ALBUMIN SERPL BCP-MCNC: 4.2 G/DL (ref 3.5–5.2)
ALP SERPL-CCNC: 74 U/L (ref 55–135)
ALT SERPL W/O P-5'-P-CCNC: 30 U/L (ref 10–44)
ANION GAP SERPL CALC-SCNC: 9 MMOL/L (ref 8–16)
AST SERPL-CCNC: 23 U/L (ref 10–40)
BASOPHILS # BLD AUTO: 0.04 K/UL (ref 0–0.2)
BASOPHILS NFR BLD: 0.6 % (ref 0–1.9)
BILIRUB SERPL-MCNC: 1 MG/DL (ref 0.1–1)
BUN SERPL-MCNC: 16 MG/DL (ref 8–23)
CALCIUM SERPL-MCNC: 10.6 MG/DL (ref 8.7–10.5)
CHLORIDE SERPL-SCNC: 103 MMOL/L (ref 95–110)
CO2 SERPL-SCNC: 27 MMOL/L (ref 23–29)
CREAT SERPL-MCNC: 1 MG/DL (ref 0.5–1.4)
DIFFERENTIAL METHOD: NORMAL
EOSINOPHIL # BLD AUTO: 0.1 K/UL (ref 0–0.5)
EOSINOPHIL NFR BLD: 1.5 % (ref 0–8)
ERYTHROCYTE [DISTWIDTH] IN BLOOD BY AUTOMATED COUNT: 13.2 % (ref 11.5–14.5)
EST. GFR  (NO RACE VARIABLE): >60 ML/MIN/1.73 M^2
GLUCOSE SERPL-MCNC: 96 MG/DL (ref 70–110)
HBV CORE IGM SERPL QL IA: NORMAL
HCT VFR BLD AUTO: 48.4 % (ref 37–48.5)
HCV AB SERPL QL IA: NORMAL
HGB BLD-MCNC: 15.6 G/DL (ref 12–16)
IMM GRANULOCYTES # BLD AUTO: 0.02 K/UL (ref 0–0.04)
IMM GRANULOCYTES NFR BLD AUTO: 0.3 % (ref 0–0.5)
LYMPHOCYTES # BLD AUTO: 2.6 K/UL (ref 1–4.8)
LYMPHOCYTES NFR BLD: 39.7 % (ref 18–48)
MCH RBC QN AUTO: 29.2 PG (ref 27–31)
MCHC RBC AUTO-ENTMCNC: 32.2 G/DL (ref 32–36)
MCV RBC AUTO: 91 FL (ref 82–98)
MONOCYTES # BLD AUTO: 0.6 K/UL (ref 0.3–1)
MONOCYTES NFR BLD: 9.2 % (ref 4–15)
NEUTROPHILS # BLD AUTO: 3.2 K/UL (ref 1.8–7.7)
NEUTROPHILS NFR BLD: 48.7 % (ref 38–73)
NRBC BLD-RTO: 0 /100 WBC
PLATELET # BLD AUTO: 252 K/UL (ref 150–450)
PMV BLD AUTO: 11.2 FL (ref 9.2–12.9)
POTASSIUM SERPL-SCNC: 3.7 MMOL/L (ref 3.5–5.1)
PROT SERPL-MCNC: 8 G/DL (ref 6–8.4)
RBC # BLD AUTO: 5.34 M/UL (ref 4–5.4)
SODIUM SERPL-SCNC: 139 MMOL/L (ref 136–145)
WBC # BLD AUTO: 6.65 K/UL (ref 3.9–12.7)

## 2022-11-25 LAB
ALBUMIN SERPL ELPH-MCNC: 4.51 G/DL (ref 3.35–5.55)
ALPHA1 GLOB SERPL ELPH-MCNC: 0.28 G/DL (ref 0.17–0.41)
ALPHA2 GLOB SERPL ELPH-MCNC: 0.74 G/DL (ref 0.43–0.99)
ANA SER QL IF: NORMAL
B-GLOBULIN SERPL ELPH-MCNC: 1.06 G/DL (ref 0.5–1.1)
GAMMA GLOB SERPL ELPH-MCNC: 1 G/DL (ref 0.67–1.58)
PROT SERPL-MCNC: 7.6 G/DL (ref 6–8.4)
RPR SER QL: NORMAL

## 2022-11-28 LAB
A ALTERNATA IGE QN: 0.15 KU/L
A FUMIGATUS IGE QN: 2.1 KU/L
ALLERGEN BOXELDER MAPLE TREE IGE: <0.1 KU/L
ALLERGEN CHAETOMIUM GLOBOSUM IGE: <0.1 KU/L
ALLERGEN MAPLE (BOX ELDER) CLASS: NORMAL
ALLERGEN MULBERRY CLASS: NORMAL
ALLERGEN MULBERRY TREE IGE: <0.1 KU/L
ALLERGEN WHITE ASH TREE IGE: <0.1 KU/L
ALLERGEN WHITE PINE TREE IGE: <0.1 KU/L
AMER SYCAMORE IGE QN: <0.35 KU/L
BAHIA GRASS IGE QN: <0.1 KU/L
BALD CYPRESS IGE QN: <0.1 KU/L
C HERBARUM IGE QN: <0.1 KU/L
C LUNATA IGE QN: <0.1 KU/L
CAT DANDER IGE QN: <0.1 KU/L
CHAETOMIUM GLOB. CLASS: NORMAL
CLAM IGE QN: <0.1 KU/L
COCKLEBUR IGE QN: <0.1 KU/L
COCKSFOOT IGE QN: <0.1 KU/L
COMMON RAGWEED IGE QN: <0.1 KU/L
COTTONWOOD IGE QN: <0.1 KU/L
CRAB IGE QN: <0.1 KU/L
D FARINAE IGE QN: <0.1 KU/L
D PTERONYSS IGE QN: <0.1 KU/L
DEPRECATED A ALTERNATA IGE RAST QL: ABNORMAL
DEPRECATED A FUMIGATUS IGE RAST QL: ABNORMAL
DEPRECATED BAHIA GRASS IGE RAST QL: NORMAL
DEPRECATED BALD CYPRESS IGE RAST QL: NORMAL
DEPRECATED C HERBARUM IGE RAST QL: NORMAL
DEPRECATED C LUNATA IGE RAST QL: NORMAL
DEPRECATED CAT DANDER IGE RAST QL: NORMAL
DEPRECATED CLAM IGE RAST QL: NORMAL
DEPRECATED COCKLEBUR IGE RAST QL: NORMAL
DEPRECATED COCKSFOOT IGE RAST QL: NORMAL
DEPRECATED COMMON RAGWEED IGE RAST QL: NORMAL
DEPRECATED COTTONWOOD IGE RAST QL: NORMAL
DEPRECATED CRAB IGE RAST QL: NORMAL
DEPRECATED D FARINAE IGE RAST QL: NORMAL
DEPRECATED D PTERONYSS IGE RAST QL: NORMAL
DEPRECATED ELDER IGE RAST QL: NORMAL
DEPRECATED ENGL PLANTAIN IGE RAST QL: NORMAL
DEPRECATED GUM-TREE IGE RAST QL: NORMAL
DEPRECATED HACKBERRY TREE IGE RAST QL: NORMAL
DEPRECATED JOHNSON GRASS IGE RAST QL: NORMAL
DEPRECATED KENT BLUE GRASS IGE RAST QL: NORMAL
DEPRECATED LOBSTER IGE RAST QL: NORMAL
DEPRECATED LONDON PLANE IGE RAST QL: NORMAL
DEPRECATED MUGWORT IGE RAST QL: NORMAL
DEPRECATED NETTLE IGE RAST QL: NORMAL
DEPRECATED OYSTER IGE RAST QL: NORMAL
DEPRECATED PECAN/HICK TREE IGE RAST QL: NORMAL
DEPRECATED PER RYE GRASS IGE RAST QL: NORMAL
DEPRECATED PRIVET IGE RAST QL: NORMAL
DEPRECATED RED TOP GRASS IGE RAST QL: NORMAL
DEPRECATED ROACH IGE RAST QL: NORMAL
DEPRECATED SALTWORT IGE RAST QL: NORMAL
DEPRECATED SHEEP SORREL IGE RAST QL: NORMAL
DEPRECATED SHRIMP IGE RAST QL: NORMAL
DEPRECATED SILVER BIRCH IGE RAST QL: NORMAL
DEPRECATED TIMOTHY IGE RAST QL: NORMAL
DEPRECATED WHITE OAK IGE RAST QL: NORMAL
DEPRECATED WILLOW IGE RAST QL: NORMAL
ELDER IGE QN: <0.1 KU/L
ELM CEDAR CLASS: NORMAL
ELM CEDAR, IGE: <0.1 KU/L
ENGL PLANTAIN IGE QN: <0.1 KU/L
GUM-TREE IGE QN: <0.1 KU/L
HACKBERRY TREE IGE QN: <0.1 KU/L
JOHNSON GRASS IGE QN: <0.1 KU/L
KENT BLUE GRASS IGE QN: <0.1 KU/L
LOBSTER IGE QN: <0.1 KU/L
LONDON PLANE IGE QN: <0.1 KU/L
MUGWORT IGE QN: <0.1 KU/L
NETTLE IGE QN: <0.1 KU/L
OYSTER IGE QN: <0.1 KU/L
PATHOLOGIST INTERPRETATION SPE: NORMAL
PECAN/HICK TREE IGE QN: <0.1 KU/L
PER RYE GRASS IGE QN: <0.1 KU/L
PRIVET IGE QN: <0.1 KU/L
RED TOP GRASS IGE QN: <0.1 KU/L
ROACH IGE QN: <0.1 KU/L
SALTWORT IGE QN: <0.1 KU/L
SHEEP SORREL IGE QN: <0.1 KU/L
SHRIMP IGE QN: <0.1 KU/L
SILVER BIRCH IGE QN: <0.1 KU/L
STEMPHYLIUM HERBARUM CLASS: NORMAL
STEMPHYLLIUM, IGE: <0.1 KU/L
STRONGYLOIDES ANTIBODY IGG: NEGATIVE
TIMOTHY IGE QN: <0.1 KU/L
TRYPTASE LEVEL: 3.7 NG/ML
WHITE ASH CLASS: NORMAL
WHITE OAK IGE QN: <0.1 KU/L
WHITE PINE CLASS: NORMAL
WILLOW IGE QN: <0.1 KU/L

## 2022-11-29 LAB
A LUMBRIC IGE QN: <0.1 KU/L
DEPRECATED A LUMBRIC IGE RAST QL: 0
T CANIS IGG SER QL IA: NEGATIVE

## 2022-12-01 ENCOUNTER — LAB VISIT (OUTPATIENT)
Dept: LAB | Facility: HOSPITAL | Age: 62
End: 2022-12-01
Attending: FAMILY MEDICINE
Payer: COMMERCIAL

## 2022-12-01 DIAGNOSIS — E78.5 HYPERLIPIDEMIA, UNSPECIFIED HYPERLIPIDEMIA TYPE: ICD-10-CM

## 2022-12-01 LAB
ALBUMIN SERPL BCP-MCNC: 4 G/DL (ref 3.5–5.2)
ALP SERPL-CCNC: 72 U/L (ref 55–135)
ALT SERPL W/O P-5'-P-CCNC: 31 U/L (ref 10–44)
AST SERPL-CCNC: 22 U/L (ref 10–40)
BILIRUB DIRECT SERPL-MCNC: 0.4 MG/DL (ref 0.1–0.3)
BILIRUB SERPL-MCNC: 1.4 MG/DL (ref 0.1–1)
CHOLEST SERPL-MCNC: 258 MG/DL (ref 120–199)
CHOLEST/HDLC SERPL: 5.5 {RATIO} (ref 2–5)
HDLC SERPL-MCNC: 47 MG/DL (ref 40–75)
HDLC SERPL: 18.2 % (ref 20–50)
LDLC SERPL CALC-MCNC: 166.4 MG/DL (ref 63–159)
NONHDLC SERPL-MCNC: 211 MG/DL
PROT SERPL-MCNC: 7.5 G/DL (ref 6–8.4)
TRIGL SERPL-MCNC: 223 MG/DL (ref 30–150)

## 2022-12-01 PROCEDURE — 80061 LIPID PANEL: CPT | Performed by: FAMILY MEDICINE

## 2022-12-01 PROCEDURE — 80076 HEPATIC FUNCTION PANEL: CPT | Performed by: FAMILY MEDICINE

## 2022-12-01 PROCEDURE — 36415 COLL VENOUS BLD VENIPUNCTURE: CPT | Performed by: FAMILY MEDICINE

## 2022-12-02 LAB
CU INDEX: <6.6
CU, ANTI-THYROGLOBULIN IGG: <20 IU/ML
CU, ANTI-THYROID PEROXIDASE IGG: <10 IU/ML
CU, TSH (THYROTROPIN): 1.78 UIU/ML (ref 0.4–4)

## 2022-12-14 ENCOUNTER — ANESTHESIA EVENT (OUTPATIENT)
Dept: ENDOSCOPY | Facility: HOSPITAL | Age: 62
End: 2022-12-14
Payer: COMMERCIAL

## 2022-12-14 NOTE — ANESTHESIA PREPROCEDURE EVALUATION
12/14/2022  Shahzad Feliz is a 62 y.o., female.  Past Surgical History:   Procedure Laterality Date    COLONOSCOPY N/A 7/25/2019    Procedure: COLONOSCOPY;  Surgeon: Domenico Howard MD;  Location: [facility];  Service: Endoscopy;  Laterality: N/A;    COLONOSCOPY W/ BIOPSIES AND POLYPECTOMY  12/17/14    tubular adenoma, hyperplastic polyp, repeat 3 years    HYSTERECTOMY      OVARIAN CYST REMOVAL       Past Medical History:   Diagnosis Date    Bone spur     disc    Bulging disc     Hypertension          Pre-op Assessment    I have reviewed the Patient Summary Reports.     I have reviewed the Nursing Notes. I have reviewed the NPO Status.   I have reviewed the Medications.     Review of Systems  Anesthesia Hx:  No problems with previous Anesthesia  Denies Family Hx of Anesthesia complications.   Denies Personal Hx of Anesthesia complications.   Social:  Non-Smoker, Social Alcohol Use    Hematology/Oncology:  Hematology Normal   Oncology Normal     EENT/Dental:   chronic allergic rhinitis   Cardiovascular:   Hypertension hyperlipidemia    Pulmonary:  Pulmonary Normal    Renal/:  Renal/ Normal     Hepatic/GI:  Hepatic/GI Normal Bowel Prep.    Musculoskeletal:   Denies Arthritis.     Neurological:  Neurology Normal    Endocrine:  Endocrine Normal    Dermatological:  Skin Normal    Psych:  Psychiatric Normal           Physical Exam  General: Well nourished, Cooperative, Alert and Oriented    Airway:  Mallampati: II   Mouth Opening: Normal  TM Distance: Normal  Tongue: Normal  Neck ROM: Normal ROM    Dental:  Intact        Anesthesia Plan  Type of Anesthesia, risks & benefits discussed:    Anesthesia Type: Gen Natural Airway  Intra-op Monitoring Plan: Standard ASA Monitors  Post Op Pain Control Plan: multimodal analgesia  Induction:  IV  Informed Consent: Informed consent signed with the Patient and  all parties understand the risks and agree with anesthesia plan.  All questions answered. Patient consented to blood products? No  ASA Score: 2  Day of Surgery Review of History & Physical: H&P Update referred to the surgeon/provider.    Ready For Surgery From Anesthesia Perspective.     .

## 2022-12-16 ENCOUNTER — HOSPITAL ENCOUNTER (OUTPATIENT)
Facility: HOSPITAL | Age: 62
Discharge: HOME | End: 2022-12-16
Attending: INTERNAL MEDICINE | Admitting: INTERNAL MEDICINE
Payer: COMMERCIAL

## 2022-12-16 ENCOUNTER — ANESTHESIA (OUTPATIENT)
Dept: ENDOSCOPY | Facility: HOSPITAL | Age: 62
End: 2022-12-16
Payer: COMMERCIAL

## 2022-12-16 VITALS
OXYGEN SATURATION: 100 % | TEMPERATURE: 98 F | WEIGHT: 181 LBS | DIASTOLIC BLOOD PRESSURE: 71 MMHG | HEART RATE: 69 BPM | RESPIRATION RATE: 20 BRPM | SYSTOLIC BLOOD PRESSURE: 130 MMHG | HEIGHT: 71 IN | BODY MASS INDEX: 25.34 KG/M2

## 2022-12-16 DIAGNOSIS — Z86.010 PERSONAL HISTORY OF COLONIC POLYPS: Primary | ICD-10-CM

## 2022-12-16 PROBLEM — Z86.0100 PERSONAL HISTORY OF COLONIC POLYPS: Status: ACTIVE | Noted: 2022-12-16

## 2022-12-16 PROCEDURE — 45380 PR COLONOSCOPY,BIOPSY: ICD-10-PCS | Mod: 33,,, | Performed by: INTERNAL MEDICINE

## 2022-12-16 PROCEDURE — 45380 COLONOSCOPY AND BIOPSY: CPT | Mod: 33,,, | Performed by: INTERNAL MEDICINE

## 2022-12-16 PROCEDURE — 37000009 HC ANESTHESIA EA ADD 15 MINS: Performed by: INTERNAL MEDICINE

## 2022-12-16 PROCEDURE — 63600175 PHARM REV CODE 636 W HCPCS: Performed by: NURSE ANESTHETIST, CERTIFIED REGISTERED

## 2022-12-16 PROCEDURE — 45380 COLONOSCOPY AND BIOPSY: CPT | Mod: PT | Performed by: INTERNAL MEDICINE

## 2022-12-16 PROCEDURE — D9220A PRA ANESTHESIA: Mod: 33,ANES,, | Performed by: ANESTHESIOLOGY

## 2022-12-16 PROCEDURE — 37000008 HC ANESTHESIA 1ST 15 MINUTES: Performed by: INTERNAL MEDICINE

## 2022-12-16 PROCEDURE — 88305 TISSUE EXAM BY PATHOLOGIST: ICD-10-PCS | Mod: 26,,, | Performed by: PATHOLOGY

## 2022-12-16 PROCEDURE — 27201012 HC FORCEPS, HOT/COLD, DISP: Performed by: INTERNAL MEDICINE

## 2022-12-16 PROCEDURE — D9220A PRA ANESTHESIA: ICD-10-PCS | Mod: 33,ANES,, | Performed by: ANESTHESIOLOGY

## 2022-12-16 PROCEDURE — D9220A PRA ANESTHESIA: ICD-10-PCS | Mod: 33,CRNA,, | Performed by: NURSE ANESTHETIST, CERTIFIED REGISTERED

## 2022-12-16 PROCEDURE — D9220A PRA ANESTHESIA: Mod: 33,CRNA,, | Performed by: NURSE ANESTHETIST, CERTIFIED REGISTERED

## 2022-12-16 PROCEDURE — 88305 TISSUE EXAM BY PATHOLOGIST: CPT | Mod: 59 | Performed by: PATHOLOGY

## 2022-12-16 PROCEDURE — 63600175 PHARM REV CODE 636 W HCPCS: Performed by: INTERNAL MEDICINE

## 2022-12-16 PROCEDURE — 88305 TISSUE EXAM BY PATHOLOGIST: CPT | Mod: 26,,, | Performed by: PATHOLOGY

## 2022-12-16 RX ORDER — PROPOFOL 10 MG/ML
VIAL (ML) INTRAVENOUS
Status: DISCONTINUED | OUTPATIENT
Start: 2022-12-16 | End: 2022-12-16

## 2022-12-16 RX ORDER — SODIUM CHLORIDE, SODIUM LACTATE, POTASSIUM CHLORIDE, CALCIUM CHLORIDE 600; 310; 30; 20 MG/100ML; MG/100ML; MG/100ML; MG/100ML
INJECTION, SOLUTION INTRAVENOUS CONTINUOUS
Status: DISCONTINUED | OUTPATIENT
Start: 2022-12-16 | End: 2022-12-16 | Stop reason: HOSPADM

## 2022-12-16 RX ORDER — LIDOCAINE HCL/PF 100 MG/5ML
SYRINGE (ML) INTRAVENOUS
Status: DISCONTINUED | OUTPATIENT
Start: 2022-12-16 | End: 2022-12-16

## 2022-12-16 RX ADMIN — PROPOFOL 50 MG: 10 INJECTION, EMULSION INTRAVENOUS at 07:12

## 2022-12-16 RX ADMIN — PROPOFOL 50 MG: 10 INJECTION, EMULSION INTRAVENOUS at 06:12

## 2022-12-16 RX ADMIN — Medication 20 MG: at 06:12

## 2022-12-16 RX ADMIN — SODIUM CHLORIDE, SODIUM LACTATE, POTASSIUM CHLORIDE, AND CALCIUM CHLORIDE 10 ML: .6; .31; .03; .02 INJECTION, SOLUTION INTRAVENOUS at 06:12

## 2022-12-16 NOTE — DISCHARGE SUMMARY
[facility] - Endoscopy 1st Fl  Discharge Note  Short Stay    Procedure(s) (LRB):  COLONOSCOPY (N/A)      OUTCOME: Patient tolerated treatment/procedure well without complication and is now ready for discharge.    DISPOSITION: Home or Self Care    FINAL DIAGNOSIS:  Personal history of colonic polyps    FOLLOWUP: With primary care provider    DISCHARGE INSTRUCTIONS:  No discharge procedures on file.

## 2022-12-16 NOTE — H&P
Short Stay Endoscopy History and Physical    PCP - Santi Zuñiga MD    Procedure - Colonoscopy  ASA - 2  Mallampati - per anesthesia  History of Anesthesia problems - no  Family history Anesthesia problems -  no     HPI:  This is a 62 y.o. female here for evaluation of :   Active Hospital Problems    Diagnosis  POA    *Personal history of colonic polyps [Z86.010]  Not Applicable      Resolved Hospital Problems   No resolved problems to display.         Health Maintenance         Date Due Completion Date    Shingles Vaccine (1 of 2) Never done ---    COVID-19 Vaccine (3 - Booster for Pfizer series) 12/21/2021 10/26/2021    Mammogram 02/11/2022 2/11/2021    Influenza Vaccine (1) 09/01/2022 11/19/2021    Colorectal Cancer Screening 07/25/2024 7/25/2019    TETANUS VACCINE 09/18/2025 9/18/2015    Lipid Panel 12/01/2027 12/1/2022            ROS:  CONSTITUTIONAL: Denies weight change,  fatigue, fevers, chills, night sweats.  CARDIOVASCULAR: Denies chest pain, shortness of breath, orthopnea and edema.  RESPIRATORY: Denies cough, hemoptysis, dyspnea, and wheezing.  GI: See HPI.    Medical History:   Past Medical History:   Diagnosis Date    Bone spur     disc    Bulging disc     Hypertension        Surgical History:   Past Surgical History:   Procedure Laterality Date    COLONOSCOPY N/A 7/25/2019    Procedure: COLONOSCOPY;  Surgeon: Domenico Howard MD;  Location: [facility];  Service: Endoscopy;  Laterality: N/A;    COLONOSCOPY W/ BIOPSIES AND POLYPECTOMY  12/17/14    tubular adenoma, hyperplastic polyp, repeat 3 years    HYSTERECTOMY      OVARIAN CYST REMOVAL         Family History:   History reviewed. No pertinent family history.    Social History:   Social History     Tobacco Use    Smoking status: Former    Smokeless tobacco: Never   Substance Use Topics    Alcohol use: Yes    Drug use: No       Allergies:   Review of patient's allergies indicates:   Allergen Reactions    Wasp venom      Large local swelling, no systemic  symptoms    Grass pollen-june grass standard Hives and Itching    Latex, natural rubber     Mold     Pcn [penicillins] Other (See Comments)     Passed out and seizures       Medications:   No current facility-administered medications on file prior to encounter.     Current Outpatient Medications on File Prior to Encounter   Medication Sig Dispense Refill    ASCORBIC ACID (WILLIAM-C ORAL) Take by mouth.      b complex vitamins capsule Take 1 capsule by mouth once daily.      black cohosh 20 mg Tab Take by mouth.      cholecalciferol, vitamin D3, 1,000 unit/spray SpSn Place 2,000 Units under the tongue.       GLUTAMINE ORAL Take by mouth daily as needed (for dyspepsia).      milk thistle 150 mg Cap Take by mouth 2 (two) times a day.      multivitamin (THERAGRAN) per tablet Take 1 tablet by mouth once daily.      niacin (SLO-NIACIN) 500 mg tablet Take 500 mg by mouth 2 (two) times daily with meals.      triamterene-hydrochlorothiazide 37.5-25 mg (MAXZIDE-25) 37.5-25 mg per tablet Take 1 tablet by mouth once daily. 90 tablet 3    TUMERIC-GING-OLIVE-OREG-CAPRYL ORAL Take by mouth.      atorvastatin (LIPITOR) 20 MG tablet Take 1 tablet (20 mg total) by mouth once daily. (Patient taking differently: Take 20 mg by mouth once daily. Pt not taking) 90 tablet 0    gabapentin (NEURONTIN) 300 MG capsule Take 1 capsule (300 mg total) by mouth every evening. Take 45 min to 1 hour before bed as may cause drowsiness 30 capsule 1    sod sulf-pot chloride-mag sulf (SUTAB) 1.479-0.188- 0.225 gram tablet Take 12 tablets by mouth once daily. Take according to package instructions with indicated amount of water. 24 tablet 0       Physical Exam:  Vital Signs:   Vitals:    12/16/22 0622   BP: (!) 159/96   Pulse: 75   Resp: 16   Temp: 96.8 °F (36 °C)     General Appearance: Well appearing in no acute distress  ENT: OP clear  Chest: CTA B  CV: RRR, no m/r/g  Abd: s/nt/nd/nabs  Ext: no edema    Labs:Reviewed    IMP:  Active Hospital Problems     Diagnosis  POA    *Personal history of colonic polyps [Z86.010]  Not Applicable      Resolved Hospital Problems   No resolved problems to display.         Plan:   I have explained the risks and benefits of colonoscopy to the patient including but not limited to bleeding, perforation, infection, and death. The patient wishes to proceed.

## 2022-12-16 NOTE — ANESTHESIA POSTPROCEDURE EVALUATION
Anesthesia Post Evaluation    Patient: Shahzad Feliz    Procedure(s) Performed: Procedure(s) (LRB):  COLONOSCOPY (N/A)    Final Anesthesia Type: general      Patient location during evaluation: PACU  Patient participation: Yes- Able to Participate  Level of consciousness: awake and alert and oriented  Post-procedure vital signs: reviewed and stable  Pain management: adequate  Airway patency: patent    PONV status at discharge: No PONV  Anesthetic complications: no      Cardiovascular status: blood pressure returned to baseline, stable and hemodynamically stable  Respiratory status: unassisted  Hydration status: euvolemic  Follow-up not needed.          Vitals Value Taken Time   /71 12/16/22 0735   Temp 36.4 °C (97.6 °F) 12/16/22 0715   Pulse 69 12/16/22 0736   Resp 24 12/16/22 0736   SpO2 97 % 12/16/22 0736   Vitals shown include unvalidated device data.      No case tracking events are documented in the log.      Pain/Shanti Score: Shanti Score: 9 (12/16/2022  7:25 AM)

## 2022-12-16 NOTE — [employer] PATIENT INSTRUCTIONS
Discharge Summary/Instructions after an Endoscopic Procedure  Patient Name: Shahzad Feliz  Patient MRN: [id number]  Patient YOB: 1960 Friday, December 16, 2022  Tamiko Jack MD  Dear patient,  As a result of recent federal legislation (The Federal Cures Act), you may   receive lab or pathology results from your procedure in your [facility]   account before your physician is able to contact you. Your physician or   their representative will relay the results to you with their   recommendations at their soonest availability.  Thank you,  RESTRICTIONS:  During your procedure today, you received medications for sedation.  These   medications may affect your judgment, balance and coordination.  Therefore,   for 24 hours, you have the following restrictions:   - DO NOT drive a car, operate machinery, make legal/financial decisions,   sign important papers or drink alcohol.    ACTIVITY:  Today: no heavy lifting, straining or running due to procedural   sedation/anesthesia.  The following day: return to full activity including work.  DIET:  Eat and drink normally unless instructed otherwise.     TREATMENT FOR COMMON SIDE EFFECTS:  - Mild abdominal pain, nausea, belching, bloating or excessive gas:  rest,   eat lightly and use a heating pad.  - Sore Throat: treat with throat lozenges and/or gargle with warm salt   water.  - Because air was used during the procedure, expelling large amounts of air   from your rectum or belching is normal.  - If a bowel prep was taken, you may not have a bowel movement for 1-3 days.    This is normal.  SYMPTOMS TO WATCH FOR AND REPORT TO YOUR PHYSICIAN:  1. Abdominal pain or bloating, other than gas cramps.  2. Chest pain.  3. Back pain.  4. Signs of infection such as: chills or fever occurring within 24 hours   after the procedure.  5. Rectal bleeding, which would show as bright red, maroon, or black stools.   (A tablespoon of blood from the rectum is not serious, especially  if   hemorrhoids are present.)  6. Vomiting.  7. Weakness or dizziness.  GO DIRECTLY TO THE NEAREST EMERGENCY ROOM IF YOU HAVE ANY OF THE FOLLOWING:      Difficulty breathing              Chills and/or fever over 101 F   Persistent vomiting and/or vomiting blood   Severe abdominal pain   Severe chest pain   Black, tarry stools   Bleeding- more than one tablespoon   Any other symptom or condition that you feel may need urgent attention  Your doctor recommends these additional instructions:  If any biopsies were taken, your doctors clinic will contact you in 1 to 2   weeks with any results.  - Discharge patient to home (via wheelchair).   - Resume previous diet.   - Continue present medications.   - Await pathology results.   - Repeat colonoscopy in 3 years for surveillance.   - Telephone GI clinic for pathology results in 2 weeks.   - Patient has a contact number available for emergencies.  The signs and   symptoms of potential delayed complications were discussed with the   patient.  Return to normal activities tomorrow.  Written discharge   instructions were provided to the patient.  For questions, problems or results please call your physician Tamiko Jack MD at Work:  [phone]  If you have any questions about the above instructions, call the GI   department at [phone] or call the endoscopy unit at [phone]   from 7am until 3 pm.  [facility] [place], EMERGENCY ROOM PHONE NUMBER:   [phone]  IF A COMPLICATION OR EMERGENCY SITUATION ARISES AND YOU ARE UNABLE TO REACH   YOUR PHYSICIAN - GO DIRECTLY TO THE EMERGENCY ROOM.  I have read or have had read to me these discharge instructions for my   procedure and have received a written copy.  I understand these   instructions and will follow-up with my physician if I have any questions.     __________________________________       _____________________________________  Nurse Signature                                           Patient/Designated   Responsible Party Signature  MD Tamiko Correa MD  12/16/2022 7:13:57 AM  [employer]

## 2022-12-16 NOTE — TRANSFER OF CARE
"Anesthesia Transfer of Care Note    Patient: Shahzad Feliz    Procedure(s) Performed: Procedure(s) (LRB):  COLONOSCOPY (N/A)    Patient location: PACU    Anesthesia Type: general    Transport from OR: Transported from OR on room air with adequate spontaneous ventilation    Post pain: adequate analgesia    Post assessment: tolerated procedure well and no apparent anesthetic complications    Post vital signs: stable    Level of consciousness: sedated    Nausea/Vomiting: no nausea/vomiting    Complications: none    Transfer of care protocol was followed      Last vitals:   Visit Vitals  BP (!) 159/96 (BP Location: Right arm, Patient Position: Sitting)   Pulse 75   Temp 36 °C (96.8 °F) (Temporal)   Resp 16   Ht 5' 11" (1.803 m)   Wt 82.1 kg (181 lb)   SpO2 95%   BMI 25.24 kg/m²     "

## 2022-12-16 NOTE — PLAN OF CARE
Discharge instructions reviewed with patient and visitor. Handouts given & verbalized understanding with no further questions at this time. Dr. Jack spoke to pt at bedside, reviewed procedure and findings, answered questions. Made aware they are awaiting biopsy results with MD telephone number provided per AVS sheet. VSS on RA, no pain or nausea noted, tolerating po fluids, no complaints noted. Fall precautions reviewed, consents in chart.

## 2022-12-21 LAB
FINAL PATHOLOGIC DIAGNOSIS: NORMAL
Lab: NORMAL

## 2023-01-23 PROBLEM — Z00.00 ANNUAL PHYSICAL EXAM: Status: RESOLVED | Noted: 2019-07-25 | Resolved: 2023-01-23

## 2023-01-25 ENCOUNTER — LAB VISIT (OUTPATIENT)
Dept: LAB | Facility: HOSPITAL | Age: 63
End: 2023-01-25
Attending: ALLERGY & IMMUNOLOGY
Payer: COMMERCIAL

## 2023-01-25 ENCOUNTER — PATIENT MESSAGE (OUTPATIENT)
Dept: ADMINISTRATIVE | Facility: HOSPITAL | Age: 63
End: 2023-01-25
Payer: COMMERCIAL

## 2023-01-25 ENCOUNTER — OFFICE VISIT (OUTPATIENT)
Dept: ALLERGY | Facility: CLINIC | Age: 63
End: 2023-01-25
Payer: COMMERCIAL

## 2023-01-25 VITALS
HEART RATE: 68 BPM | BODY MASS INDEX: 24.81 KG/M2 | SYSTOLIC BLOOD PRESSURE: 135 MMHG | WEIGHT: 183.19 LBS | HEIGHT: 72 IN | DIASTOLIC BLOOD PRESSURE: 80 MMHG | TEMPERATURE: 97 F

## 2023-01-25 DIAGNOSIS — K90.49 FOOD INTOLERANCE: ICD-10-CM

## 2023-01-25 DIAGNOSIS — J31.0 RHINITIS, UNSPECIFIED TYPE: ICD-10-CM

## 2023-01-25 DIAGNOSIS — J30.89 ALLERGIC RHINITIS DUE TO MOLD: Primary | ICD-10-CM

## 2023-01-25 DIAGNOSIS — L50.5 CHOLINERGIC URTICARIA: ICD-10-CM

## 2023-01-25 DIAGNOSIS — L50.3 DERMOGRAPHIA: ICD-10-CM

## 2023-01-25 DIAGNOSIS — Z91.018 FOOD ALLERGY: ICD-10-CM

## 2023-01-25 PROCEDURE — 99214 OFFICE O/P EST MOD 30 MIN: CPT | Mod: S$GLB,,, | Performed by: ALLERGY & IMMUNOLOGY

## 2023-01-25 PROCEDURE — 86003 ALLG SPEC IGE CRUDE XTRC EA: CPT | Performed by: ALLERGY & IMMUNOLOGY

## 2023-01-25 PROCEDURE — 99214 PR OFFICE/OUTPT VISIT, EST, LEVL IV, 30-39 MIN: ICD-10-PCS | Mod: S$GLB,,, | Performed by: ALLERGY & IMMUNOLOGY

## 2023-01-25 PROCEDURE — 99999 PR PBB SHADOW E&M-EST. PATIENT-LVL IV: CPT | Mod: PBBFAC,,, | Performed by: ALLERGY & IMMUNOLOGY

## 2023-01-25 PROCEDURE — 36415 COLL VENOUS BLD VENIPUNCTURE: CPT | Performed by: ALLERGY & IMMUNOLOGY

## 2023-01-25 PROCEDURE — 99999 PR PBB SHADOW E&M-EST. PATIENT-LVL IV: ICD-10-PCS | Mod: PBBFAC,,, | Performed by: ALLERGY & IMMUNOLOGY

## 2023-01-25 RX ORDER — AZELASTINE 1 MG/ML
1 SPRAY, METERED NASAL 2 TIMES DAILY
Qty: 20 ML | Refills: 5 | Status: SHIPPED | OUTPATIENT
Start: 2023-01-25 | End: 2023-08-04

## 2023-01-25 NOTE — PROGRESS NOTES
"Subjective:       Patient ID: Shahzad Feliz is a 62 y.o. female.    Chief Complaint:  Follow-up      HPI today:    Back is better  She reports no rashes  She has avoided seafood.  She suspected seafood causes "pimples" on her face and head. Lesions last a few days.  No history of anaphylaxis to a food.  She suspects her daughter's dog causes a runny nose.              HPI 11/23/2022: 62 year old female referred by Dr. García for allergies  Symptoms_  "outbreaks on my back"  Hives- worse at night  Triggers- sweating cause her to itch and hives  Hives- arms and back  Last less than 24 hours  Raised erythema that itches  She places alcohol and bactriban on the area after the wipes the sweat.  She takes levocetirizine- daily, not helping  She denies tongue or throat swelling.  Duration- several years      Seafood- shrimp and crab- acne on face, itchy eyes  Fish does not cause symptoms;    Runny nose intermittently    Pcn- "blacked out"- 14 years of age  When I left the hospital they told me "you are allergic to all antibiotics".    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    Stopped smoking 12 years ago- 30 years- 1 pack   smokes.    Past Medical History:   Diagnosis Date    Bone spur     disc    Bulging disc     Hypertension         Family History:  Daughter has allergic rhinitis      Current Outpatient Medications on File Prior to Visit   Medication Sig Dispense Refill    ASCORBIC ACID (WILLIAM-C ORAL) Take by mouth.      atorvastatin (LIPITOR) 20 MG tablet Take 1 tablet (20 mg total) by mouth once daily. (Patient taking differently: Take 20 mg by mouth once daily. Pt not taking) 90 tablet 0    azelastine (ASTELIN) 137 mcg (0.1 %) nasal spray by Nasal route.      b complex vitamins capsule Take 1 capsule by mouth once daily.      black cohosh 20 mg Tab Take by mouth.      cholecalciferol, vitamin D3, 1,000 unit/spray SpSn Place 2,000 " Units under the tongue.       EPINEPHrine (EPIPEN) 0.3 mg/0.3 mL AtIn Inject 0.3 mLs (0.3 mg total) into the muscle once. for 1 dose 2 each 3    fluticasone propionate (FLONASE) 50 mcg/actuation nasal spray 2 sprays (100 mcg total) by Each Nostril route once daily. 18.2 mL 6    GLUTAMINE ORAL Take by mouth daily as needed (for dyspepsia).      levocetirizine (XYZAL) 5 MG tablet Take 1 tablet (5 mg total) by mouth once daily. 90 tablet 3    multivitamin (THERAGRAN) per tablet Take 1 tablet by mouth once daily.      mupirocin (BACTROBAN) 2 % ointment APPLY EXTERNALLY TO THE AFFECTED AREA THREE TIMES DAILY 22 g 5    niacin (SLO-NIACIN) 500 mg tablet Take 500 mg by mouth 2 (two) times daily with meals.      triamcinolone acetonide 0.1% (KENALOG) 0.1 % cream Apply topically 2 (two) times daily. 80 g 2    triamterene-hydrochlorothiazide 37.5-25 mg (MAXZIDE-25) 37.5-25 mg per tablet Take 1 tablet by mouth once daily. 90 tablet 3    TUMERIC-GING-OLIVE-OREG-CAPRYL ORAL Take by mouth.      gabapentin (NEURONTIN) 300 MG capsule Take 1 capsule (300 mg total) by mouth every evening. Take 45 min to 1 hour before bed as may cause drowsiness 30 capsule 1    milk thistle 150 mg Cap Take by mouth 2 (two) times a day.      [DISCONTINUED] famotidine (PEPCID) 20 MG tablet Take 1 tablet (20 mg total) by mouth 2 (two) times daily. 60 tablet 11    [DISCONTINUED] levocetirizine (XYZAL) 5 MG tablet Take 1 tablet (5 mg total) by mouth every evening. 30 tablet 11    [DISCONTINUED] mupirocin calcium 2% (BACTROBAN) 2 % cream Place onto the skin.       No current facility-administered medications on file prior to visit.        Review of patient's allergies indicates:   Allergen Reactions    Wasp venom      Large local swelling, no systemic symptoms    Grass pollen-june grass standard Hives and Itching    Latex, natural rubber     Mold     Pcn [penicillins] Other (See Comments)     Passed out and seizures   .    Environmental History: Pets in the  home: dogs (1).  Tobacco Smoke in Home: no  Review of Systems   Constitutional:  Negative for chills and fever.   HENT:  Positive for congestion and rhinorrhea.    Eyes:  Positive for itching. Negative for discharge.   Respiratory:  Negative for cough, shortness of breath and wheezing.    Cardiovascular:  Positive for leg swelling. Negative for chest pain.   Gastrointestinal:  Negative for nausea and vomiting.   Skin:  Negative for rash and wound.   Allergic/Immunologic: Positive for environmental allergies and food allergies.   Neurological:  Negative for facial asymmetry and speech difficulty.   Hematological:  Negative for adenopathy. Bruises/bleeds easily.   Psychiatric/Behavioral:  Negative for behavioral problems and suicidal ideas.       Objective:    Physical Exam  Vitals reviewed.   Constitutional:       General: She is not in acute distress.     Appearance: Normal appearance. She is well-developed. She is not ill-appearing, toxic-appearing or diaphoretic.   HENT:      Head: Normocephalic and atraumatic.      Right Ear: Tympanic membrane, ear canal and external ear normal. There is no impacted cerumen.      Left Ear: Tympanic membrane, ear canal and external ear normal. There is no impacted cerumen.      Nose: Nose normal. No congestion or rhinorrhea.      Mouth/Throat:      Pharynx: No oropharyngeal exudate or posterior oropharyngeal erythema.   Eyes:      General: No scleral icterus.        Right eye: No discharge.         Left eye: No discharge.      Pupils: Pupils are equal, round, and reactive to light.   Neck:      Thyroid: No thyromegaly.   Cardiovascular:      Rate and Rhythm: Normal rate and regular rhythm.      Heart sounds: Normal heart sounds. No murmur heard.    No friction rub. No gallop.   Pulmonary:      Effort: Pulmonary effort is normal. No respiratory distress.      Breath sounds: Normal breath sounds. No stridor. No wheezing, rhonchi or rales.   Chest:      Chest wall: No tenderness.  "  Abdominal:      General: Bowel sounds are normal. There is no distension.      Palpations: Abdomen is soft. There is no mass.      Tenderness: There is no abdominal tenderness. There is no guarding or rebound.      Hernia: No hernia is present.   Musculoskeletal:         General: No swelling, tenderness, deformity or signs of injury. Normal range of motion.      Cervical back: Normal range of motion and neck supple. No rigidity or tenderness. No muscular tenderness.      Right lower leg: No edema.      Left lower leg: No edema.   Lymphadenopathy:      Cervical: No cervical adenopathy.   Skin:     General: Skin is warm.      Coloration: Skin is not jaundiced or pale.      Findings: No bruising, erythema or rash.   Neurological:      Mental Status: She is alert and oriented to person, place, and time.      Gait: Gait normal.   Psychiatric:         Mood and Affect: Mood normal.         Behavior: Behavior normal.         Thought Content: Thought content normal.         Judgment: Judgment normal.          Assessment:       1. Allergic rhinitis due to mold    2. Dermographia    3. Cholinergic urticaria    4. Food allergy    5. Food intolerance    6. Rhinitis, unspecified type           Plan:       Allergic rhinitis due to mold  -     azelastine (ASTELIN) 137 mcg (0.1 %) nasal spray; 1 spray (137 mcg total) by Nasal route 2 (two) times daily.  Dispense: 20 mL; Refill: 5    Dermographia    Cholinergic urticaria    Food allergy    Food intolerance    Rhinitis, unspecified type  -     Dog dander IgE; Future; Expected date: 01/25/2023       Continue Nasacort  Adding Astelin nasal spray  Advised that we are unable to skin prick test, as she is dermographic.  Dog IgE was reordered.   "Pimples" are not an IgE mediated reaction.    RTC 4-6 weeks or sooner, if needed     SEBASTIAN DANIELSON                      Problems Address                                                 Amount and/or Complexity                                          "                             Risk       3           [] 2 or more self-limited or minor problems                      [] Limited                                                                        [] Low                  [] 1 stable chronic illness                                                  Any combination of the two                                               OTC drugs                  []Acute, uncomplicated illness or injury                            Review of prior external notes from unique source           Minor surgery with no risk factors                                                                                                               [] 1 []2  []3+                                                                                                              Review of results from each unique test                                                                                                               [] 1 []2  [] 3+                                                                                                              Order of each unique test                                                                                                               [] 1 []2  [] 3+                                                                                                              Or                                                                                                             [] Assessment requiring an independent historian      4            [] One or more chronic illness with exacerbation,              [] Moderate                                                                      [x] Moderate                 Progression, or side effects of treatment                            -test documents or independent historians                        Prescription drug management                [x]  2 or more stable chronic illnesses                                    []  Independent interpretation of tests                              Minor surgery with identifiable risk                [] 1 undiagnosed new problem with uncertain prognosis    [x] Discussion or management of test results                    elective major surgery                [] 1 acute illness with                systemic symptoms                                                                                                                                                              [] 1 acute complicated injury                                                                                                                                          Elective major surgery                                                                                                                                                                                                                                                                                                                                                                                                  5            [] 1 or more chronic illnesses with severe exacerbation,     [] Extensive(two from below)                                         [] High                                                                                                               [] Independent interpretation of results                         Drug therapy requiring intensive                                                                                                               []Discussion of management or test interpretation           monitoring                                                                                                                                                                                                       Decision to de-escalate care                 [] 1 acute or chronic illness or injury that poses a threat                                                                                                Decision regarding hospitalization

## 2023-01-30 LAB
DEPRECATED DOG DANDER IGE RAST QL: NORMAL
DOG DANDER IGE QN: <0.1 KU/L

## 2023-02-09 ENCOUNTER — PATIENT OUTREACH (OUTPATIENT)
Dept: ADMINISTRATIVE | Facility: HOSPITAL | Age: 63
End: 2023-02-09
Payer: COMMERCIAL

## 2023-02-09 DIAGNOSIS — Z12.31 ENCOUNTER FOR SCREENING MAMMOGRAM FOR MALIGNANT NEOPLASM OF BREAST: Primary | ICD-10-CM

## 2023-02-09 NOTE — PROGRESS NOTES
Attempted to contact patient by phone for a MAMMOGRAM SCHEDULING, was able to speak with patient and schedule for New GYN and Mammogram appointment.  Updated Care Everywhere and Team.

## 2023-03-06 ENCOUNTER — OFFICE VISIT (OUTPATIENT)
Dept: ALLERGY | Facility: CLINIC | Age: 63
End: 2023-03-06
Payer: COMMERCIAL

## 2023-03-06 VITALS
SYSTOLIC BLOOD PRESSURE: 144 MMHG | HEART RATE: 91 BPM | HEIGHT: 71 IN | TEMPERATURE: 98 F | WEIGHT: 179.88 LBS | DIASTOLIC BLOOD PRESSURE: 87 MMHG | BODY MASS INDEX: 25.18 KG/M2

## 2023-03-06 DIAGNOSIS — K90.49 FOOD INTOLERANCE: ICD-10-CM

## 2023-03-06 DIAGNOSIS — L25.9 CONTACT DERMATITIS, UNSPECIFIED CONTACT DERMATITIS TYPE, UNSPECIFIED TRIGGER: Primary | ICD-10-CM

## 2023-03-06 PROCEDURE — 99999 PR PBB SHADOW E&M-EST. PATIENT-LVL IV: CPT | Mod: PBBFAC,,, | Performed by: ALLERGY & IMMUNOLOGY

## 2023-03-06 PROCEDURE — 99214 PR OFFICE/OUTPT VISIT, EST, LEVL IV, 30-39 MIN: ICD-10-PCS | Mod: 25,S$GLB,, | Performed by: ALLERGY & IMMUNOLOGY

## 2023-03-06 PROCEDURE — 99214 OFFICE O/P EST MOD 30 MIN: CPT | Mod: 25,S$GLB,, | Performed by: ALLERGY & IMMUNOLOGY

## 2023-03-06 PROCEDURE — 99999 PR PBB SHADOW E&M-EST. PATIENT-LVL IV: ICD-10-PCS | Mod: PBBFAC,,, | Performed by: ALLERGY & IMMUNOLOGY

## 2023-03-06 PROCEDURE — 95044 PR ALLERGY PATCH TESTS: ICD-10-PCS | Mod: S$GLB,,, | Performed by: ALLERGY & IMMUNOLOGY

## 2023-03-06 PROCEDURE — 95044 PATCH/APPLICATION TESTS: CPT | Mod: S$GLB,,, | Performed by: ALLERGY & IMMUNOLOGY

## 2023-03-06 NOTE — PROGRESS NOTES
"Subjective:       Patient ID: Shahzad Feliz is a 62 y.o. female.    Chief Complaint:  Allergy Testing    HPI today:62 year old female  She is here for patch testing.  She reports pimples on her face related to eating shellfish two- three days ago. She denies anaphylaxis.              HPI 1/25/2023:    Back is better  She reports no rashes  She has avoided seafood.  She suspected seafood causes "pimples" on her face and head. Lesions last a few days.  No history of anaphylaxis to a food.  She suspects her daughter's dog causes a runny nose.              HPI 11/23/2022: 62 year old female referred by Dr. García for allergies  Symptoms_  "outbreaks on my back"  Hives- worse at night  Triggers- sweating cause her to itch and hives  Hives- arms and back  Last less than 24 hours  Raised erythema that itches  She places alcohol and bactriban on the area after the wipes the sweat.  She takes levocetirizine- daily, not helping  She denies tongue or throat swelling.  Duration- several years      Seafood- shrimp and crab- acne on face, itchy eyes  Fish does not cause symptoms;    Runny nose intermittently    Pcn- "blacked out"- 14 years of age  When I left the hospital they told me "you are allergic to all antibiotics".    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    Stopped smoking 12 years ago- 30 years- 1 pack   smokes.    Past Medical History:   Diagnosis Date    Bone spur     disc    Bulging disc     Hypertension         Family History:  Daughter has allergic rhinitis      Current Outpatient Medications on File Prior to Visit   Medication Sig Dispense Refill    ASCORBIC ACID (WILLIAM-C ORAL) Take by mouth.      atorvastatin (LIPITOR) 20 MG tablet Take 1 tablet (20 mg total) by mouth once daily. (Patient taking differently: Take 20 mg by mouth once daily. Pt not taking) 90 tablet 0    azelastine (ASTELIN) 137 mcg (0.1 %) nasal spray by " Nasal route.      azelastine (ASTELIN) 137 mcg (0.1 %) nasal spray 1 spray (137 mcg total) by Nasal route 2 (two) times daily. 20 mL 5    b complex vitamins capsule Take 1 capsule by mouth once daily.      black cohosh 20 mg Tab Take by mouth.      cholecalciferol, vitamin D3, 1,000 unit/spray SpSn Place 2,000 Units under the tongue.       fluticasone propionate (FLONASE) 50 mcg/actuation nasal spray 2 sprays (100 mcg total) by Each Nostril route once daily. 18.2 mL 6    GLUTAMINE ORAL Take by mouth daily as needed (for dyspepsia).      levocetirizine (XYZAL) 5 MG tablet Take 1 tablet (5 mg total) by mouth once daily. 90 tablet 3    milk thistle 150 mg Cap Take by mouth 2 (two) times a day.      multivitamin (THERAGRAN) per tablet Take 1 tablet by mouth once daily.      mupirocin (BACTROBAN) 2 % ointment APPLY EXTERNALLY TO THE AFFECTED AREA THREE TIMES DAILY 22 g 5    niacin (SLO-NIACIN) 500 mg tablet Take 500 mg by mouth 2 (two) times daily with meals.      triamcinolone acetonide 0.1% (KENALOG) 0.1 % cream Apply topically 2 (two) times daily. 80 g 2    triamterene-hydrochlorothiazide 37.5-25 mg (MAXZIDE-25) 37.5-25 mg per tablet Take 1 tablet by mouth once daily. 90 tablet 3    TUMERIC-GING-OLIVE-OREG-CAPRYL ORAL Take by mouth.      EPINEPHrine (EPIPEN) 0.3 mg/0.3 mL AtIn Inject 0.3 mLs (0.3 mg total) into the muscle once. for 1 dose 2 each 3    gabapentin (NEURONTIN) 300 MG capsule Take 1 capsule (300 mg total) by mouth every evening. Take 45 min to 1 hour before bed as may cause drowsiness 30 capsule 1     No current facility-administered medications on file prior to visit.        Review of patient's allergies indicates:   Allergen Reactions    Wasp venom      Large local swelling, no systemic symptoms    Grass pollen-june grass standard Hives and Itching    Latex, natural rubber     Mold     Pcn [penicillins] Other (See Comments)     Passed out and seizures   .    Environmental History: Pets in the home: dogs  (1).  Tobacco Smoke in Home: no  Review of Systems   Constitutional:  Negative for chills and fever.   HENT:  Negative for congestion and rhinorrhea.    Eyes:  Negative for discharge and itching.   Respiratory:  Negative for cough, shortness of breath and wheezing.    Cardiovascular:  Negative for chest pain and leg swelling.   Gastrointestinal:  Positive for abdominal distention and abdominal pain. Negative for nausea and vomiting.   Skin:  Positive for rash. Negative for wound.   Allergic/Immunologic: Positive for environmental allergies and food allergies.   Neurological:  Negative for facial asymmetry and speech difficulty.   Hematological:  Negative for adenopathy. Bruises/bleeds easily.   Psychiatric/Behavioral:  Negative for behavioral problems and suicidal ideas.       Objective:    Physical Exam  Vitals reviewed.   Constitutional:       General: She is not in acute distress.     Appearance: Normal appearance. She is well-developed. She is not ill-appearing, toxic-appearing or diaphoretic.   HENT:      Head: Normocephalic and atraumatic.      Right Ear: Tympanic membrane, ear canal and external ear normal. There is no impacted cerumen.      Left Ear: Tympanic membrane, ear canal and external ear normal. There is no impacted cerumen.      Nose: Nose normal. No congestion or rhinorrhea.      Mouth/Throat:      Pharynx: No oropharyngeal exudate or posterior oropharyngeal erythema.   Eyes:      General: No scleral icterus.        Right eye: No discharge.         Left eye: No discharge.      Pupils: Pupils are equal, round, and reactive to light.   Neck:      Thyroid: No thyromegaly.   Cardiovascular:      Rate and Rhythm: Normal rate and regular rhythm.      Heart sounds: Normal heart sounds. No murmur heard.    No friction rub. No gallop.   Pulmonary:      Effort: Pulmonary effort is normal. No respiratory distress.      Breath sounds: Normal breath sounds. No stridor. No wheezing, rhonchi or rales.   Chest:       Chest wall: No tenderness.   Abdominal:      General: Bowel sounds are normal. There is no distension.      Palpations: Abdomen is soft. There is no mass.      Tenderness: There is no abdominal tenderness. There is no guarding or rebound.      Hernia: No hernia is present.   Musculoskeletal:         General: No swelling, tenderness, deformity or signs of injury. Normal range of motion.      Cervical back: Normal range of motion and neck supple. No rigidity or tenderness. No muscular tenderness.      Right lower leg: No edema.      Left lower leg: No edema.   Lymphadenopathy:      Cervical: No cervical adenopathy.   Skin:     General: Skin is warm.      Coloration: Skin is not jaundiced or pale.      Findings: No bruising, erythema or rash.   Neurological:      Mental Status: She is alert and oriented to person, place, and time.      Gait: Gait normal.   Psychiatric:         Mood and Affect: Mood normal.         Behavior: Behavior normal.         Thought Content: Thought content normal.         Judgment: Judgment normal.              History of possible allergic contact dermatitis.  Here today for patch test placement.  Instructions have been reviewed with the patient.      Denies recent oral corticosteroid intake.  No involvement of back.      T.R.U.E. Test patch testing placed today (36 allergens, 3 panels).          Assessment:       1. Contact dermatitis, unspecified contact dermatitis type, unspecified trigger    2. Food intolerance             Plan:       Contact dermatitis, unspecified contact dermatitis type, unspecified trigger    Food intolerance    Patient given written and verbal instructions detailing to not have areas in direct contact with any water. Patient instructed to avoid hair washing until 72-96 hours.  Pt instructed to avoid indoor/outdoor activities that could lead to increased sweating.  Will remove patches and perform read at 48 hours and again at 72-96 hours as MD visits Patient  "acknowledged understanding.        "Pimples" are not an IgE mediated reaction. Discussed food allergy versus intolerance. Recommend avoiding all foods that cause symptoms.    RTC 4-6 weeks or sooner, if needed     SEBASTIAN DANIELSON                      Problems Address                                                 Amount and/or Complexity                                                                      Risk       3           [] 2 or more self-limited or minor problems                      [] Limited                                                                        [] Low                  [] 1 stable chronic illness                                                  Any combination of the two                                               OTC drugs                  []Acute, uncomplicated illness or injury                            Review of prior external notes from unique source           Minor surgery with no risk factors                                                                                                               [] 1 []2  []3+                                                                                                              Review of results from each unique test                                                                                                               [] 1 []2  [] 3+                                                                                                              Order of each unique test                                                                                                               [] 1 []2  [] 3+                                                                                                              Or                                                                                                             [] Assessment requiring an independent historian      4            [] One or more chronic illness with exacerbation,              " [] Moderate                                                                      [] Moderate                 Progression, or side effects of treatment                            -test documents or independent historians                        Prescription drug management                [x]  2 or more stable chronic illnesses                                    [] Independent interpretation of tests                              Minor surgery with identifiable risk                [] 1 undiagnosed new problem with uncertain prognosis    [] Discussion or management of test results                    elective major surgery                [] 1 acute illness with                systemic symptoms                                                                                                                                                              [] 1 acute complicated injury                                                                                                                                          Elective major surgery                                                                                                                                                                                                                                                                                                                                                                                                  5            [] 1 or more chronic illnesses with severe exacerbation,     [] Extensive(two from below)                                         [] High                                                                                                               [] Independent interpretation of results                         Drug therapy requiring intensive                                                                                                               []Discussion of management or test interpretation            monitoring                                                                                                                                                                                                       Decision to de-escalate care                 [] 1 acute or chronic illness or injury that poses a threat                                                                                               Decision regarding hospitalization

## 2023-03-08 ENCOUNTER — OFFICE VISIT (OUTPATIENT)
Dept: ALLERGY | Facility: CLINIC | Age: 63
End: 2023-03-08
Payer: COMMERCIAL

## 2023-03-08 VITALS
TEMPERATURE: 99 F | WEIGHT: 179.88 LBS | HEIGHT: 71 IN | SYSTOLIC BLOOD PRESSURE: 119 MMHG | HEART RATE: 82 BPM | DIASTOLIC BLOOD PRESSURE: 79 MMHG | BODY MASS INDEX: 25.18 KG/M2

## 2023-03-08 DIAGNOSIS — L23.0 ALLERGIC CONTACT DERMATITIS DUE TO METALS: Primary | ICD-10-CM

## 2023-03-08 PROCEDURE — 99999 PR PBB SHADOW E&M-EST. PATIENT-LVL IV: CPT | Mod: PBBFAC,,, | Performed by: ALLERGY & IMMUNOLOGY

## 2023-03-08 PROCEDURE — 99213 PR OFFICE/OUTPT VISIT, EST, LEVL III, 20-29 MIN: ICD-10-PCS | Mod: S$GLB,,, | Performed by: ALLERGY & IMMUNOLOGY

## 2023-03-08 PROCEDURE — 99213 OFFICE O/P EST LOW 20 MIN: CPT | Mod: S$GLB,,, | Performed by: ALLERGY & IMMUNOLOGY

## 2023-03-08 PROCEDURE — 99999 PR PBB SHADOW E&M-EST. PATIENT-LVL IV: ICD-10-PCS | Mod: PBBFAC,,, | Performed by: ALLERGY & IMMUNOLOGY

## 2023-03-08 NOTE — PROGRESS NOTES
"Subjective:       Patient ID: Shahzad Feliz is a 62 y.o. female.    Chief Complaint:  Follow-up    HPI today:      3-8-2023- She is her for patch test removal and 48 hour patch test reading.      3/6/2023-62 year old female  She is here for patch testing.  She reports pimples on her face related to eating shellfish two- three days ago. She denies anaphylaxis.              HPI 1/25/2023:    Back is better  She reports no rashes  She has avoided seafood.  She suspected seafood causes "pimples" on her face and head. Lesions last a few days.  No history of anaphylaxis to a food.  She suspects her daughter's dog causes a runny nose.              HPI 11/23/2022: 62 year old female referred by Dr. García for allergies  Symptoms_  "outbreaks on my back"  Hives- worse at night  Triggers- sweating cause her to itch and hives  Hives- arms and back  Last less than 24 hours  Raised erythema that itches  She places alcohol and bactriban on the area after the wipes the sweat.  She takes levocetirizine- daily, not helping  She denies tongue or throat swelling.  Duration- several years      Seafood- shrimp and crab- acne on face, itchy eyes  Fish does not cause symptoms;    Runny nose intermittently    Pcn- "blacked out"- 14 years of age  When I left the hospital they told me "you are allergic to all antibiotics".    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    Stopped smoking 12 years ago- 30 years- 1 pack   smokes.    Past Medical History:   Diagnosis Date    Bone spur     disc    Bulging disc     Hypertension         Family History:  Daughter has allergic rhinitis      Current Outpatient Medications on File Prior to Visit   Medication Sig Dispense Refill    ASCORBIC ACID (WILLIAM-C ORAL) Take by mouth.      atorvastatin (LIPITOR) 20 MG tablet Take 1 tablet (20 mg total) by mouth once daily. (Patient taking differently: Take 20 mg by mouth once " daily. Pt not taking) 90 tablet 0    azelastine (ASTELIN) 137 mcg (0.1 %) nasal spray by Nasal route.      azelastine (ASTELIN) 137 mcg (0.1 %) nasal spray 1 spray (137 mcg total) by Nasal route 2 (two) times daily. 20 mL 5    b complex vitamins capsule Take 1 capsule by mouth once daily.      black cohosh 20 mg Tab Take by mouth.      cholecalciferol, vitamin D3, 1,000 unit/spray SpSn Place 2,000 Units under the tongue.       fluticasone propionate (FLONASE) 50 mcg/actuation nasal spray 2 sprays (100 mcg total) by Each Nostril route once daily. 18.2 mL 6    GLUTAMINE ORAL Take by mouth daily as needed (for dyspepsia).      levocetirizine (XYZAL) 5 MG tablet Take 1 tablet (5 mg total) by mouth once daily. 90 tablet 3    milk thistle 150 mg Cap Take by mouth 2 (two) times a day.      multivitamin (THERAGRAN) per tablet Take 1 tablet by mouth once daily.      mupirocin (BACTROBAN) 2 % ointment APPLY EXTERNALLY TO THE AFFECTED AREA THREE TIMES DAILY 22 g 5    niacin (SLO-NIACIN) 500 mg tablet Take 500 mg by mouth 2 (two) times daily with meals.      triamcinolone acetonide 0.1% (KENALOG) 0.1 % cream Apply topically 2 (two) times daily. 80 g 2    triamterene-hydrochlorothiazide 37.5-25 mg (MAXZIDE-25) 37.5-25 mg per tablet Take 1 tablet by mouth once daily. 90 tablet 3    TUMERIC-GING-OLIVE-OREG-CAPRYL ORAL Take by mouth.      EPINEPHrine (EPIPEN) 0.3 mg/0.3 mL AtIn Inject 0.3 mLs (0.3 mg total) into the muscle once. for 1 dose 2 each 3    gabapentin (NEURONTIN) 300 MG capsule Take 1 capsule (300 mg total) by mouth every evening. Take 45 min to 1 hour before bed as may cause drowsiness 30 capsule 1     No current facility-administered medications on file prior to visit.        Review of patient's allergies indicates:   Allergen Reactions    Wasp venom      Large local swelling, no systemic symptoms    Grass pollen-june grass standard Hives and Itching    Latex, natural rubber     Mold     Pcn [penicillins] Other (See  Comments)     Passed out and seizures   .    Environmental History: Pets in the home: dogs (1).  Tobacco Smoke in Home: no  Review of Systems   Constitutional:  Negative for chills and fever.   HENT:  Negative for congestion and rhinorrhea.    Eyes:  Negative for discharge and itching.   Respiratory:  Negative for cough, shortness of breath and wheezing.    Cardiovascular:  Negative for chest pain and leg swelling.   Gastrointestinal:  Negative for abdominal distention, abdominal pain, nausea and vomiting.   Skin:  Positive for rash. Negative for wound.   Allergic/Immunologic: Positive for environmental allergies and food allergies.   Neurological:  Negative for facial asymmetry and speech difficulty.   Hematological:  Negative for adenopathy. Bruises/bleeds easily.   Psychiatric/Behavioral:  Negative for behavioral problems and suicidal ideas.       Objective:    Physical Exam  Vitals reviewed.   Constitutional:       General: She is not in acute distress.     Appearance: Normal appearance. She is well-developed. She is not ill-appearing, toxic-appearing or diaphoretic.   HENT:      Head: Normocephalic and atraumatic.      Right Ear: Tympanic membrane, ear canal and external ear normal. There is no impacted cerumen.      Left Ear: Tympanic membrane, ear canal and external ear normal. There is no impacted cerumen.      Nose: Nose normal. No congestion or rhinorrhea.      Mouth/Throat:      Pharynx: No oropharyngeal exudate or posterior oropharyngeal erythema.   Eyes:      General: No scleral icterus.        Right eye: No discharge.         Left eye: No discharge.      Pupils: Pupils are equal, round, and reactive to light.   Neck:      Thyroid: No thyromegaly.   Cardiovascular:      Rate and Rhythm: Normal rate and regular rhythm.      Heart sounds: Normal heart sounds. No murmur heard.    No friction rub. No gallop.   Pulmonary:      Effort: Pulmonary effort is normal. No respiratory distress.      Breath sounds:  Normal breath sounds. No stridor. No wheezing, rhonchi or rales.   Chest:      Chest wall: No tenderness.   Abdominal:      General: Bowel sounds are normal. There is no distension.      Palpations: Abdomen is soft. There is no mass.      Tenderness: There is no abdominal tenderness. There is no guarding or rebound.      Hernia: No hernia is present.   Musculoskeletal:         General: No swelling, tenderness, deformity or signs of injury. Normal range of motion.      Cervical back: Normal range of motion and neck supple. No rigidity or tenderness. No muscular tenderness.      Right lower leg: No edema.      Left lower leg: No edema.   Lymphadenopathy:      Cervical: No cervical adenopathy.   Skin:     General: Skin is warm.      Coloration: Skin is not jaundiced or pale.      Findings: No bruising, erythema or rash.   Neurological:      Mental Status: She is alert and oriented to person, place, and time.      Gait: Gait normal.   Psychiatric:         Mood and Affect: Mood normal.         Behavior: Behavior normal.         Thought Content: Thought content normal.         Judgment: Judgment normal.          History of possible allergic contact dermatitis.  Here today for 48 hour patch test read.  Instructions have been reviewed with the patient.      Denies recent oral corticosteroid intake.  No involvement of back.      T.R.U.E. Test patch testing removed today (36 allergens, 3 panels).  Patient was allowed to sit in examination for 30 minutes prior to reading.  Pt noted to be positive to the following allergens:      Nickel  Gold Sodium Thiosulfate        Assessment:       1. Allergic contact dermatitis due to metals               Plan:       Allergic contact dermatitis due to metals            Each allergen discussed with patient and patient recommend to avoid products with these allergens.  Patient given T.R.U.E. Test handout on each positive allergen.  The patient acknowledged understanding.  Recommend patient  return clinic for to 72-96 hour ofelia.              SEBASTIAN DANIELSON                    I spent a total of 21 minutes on the day of the visit.This includes face to face time and non-face to face time preparing to see the patient (eg, review of tests), obtaining and/or reviewing separately obtained history, documenting clinical information in the electronic or other health record, independently interpreting results and communicating results to the patient/family/caregiver, or care coordinator.

## 2023-03-09 ENCOUNTER — OFFICE VISIT (OUTPATIENT)
Dept: ALLERGY | Facility: CLINIC | Age: 63
End: 2023-03-09
Payer: COMMERCIAL

## 2023-03-09 VITALS — HEART RATE: 85 BPM | DIASTOLIC BLOOD PRESSURE: 77 MMHG | TEMPERATURE: 98 F | SYSTOLIC BLOOD PRESSURE: 145 MMHG

## 2023-03-09 DIAGNOSIS — Z91.09 NICKEL ALLERGY: Primary | ICD-10-CM

## 2023-03-09 DIAGNOSIS — L23.5 ALLERGIC CONTACT DERMATITIS DUE TO CHEMICAL: ICD-10-CM

## 2023-03-09 DIAGNOSIS — L23.0 ALLERGIC CONTACT DERMATITIS DUE TO METALS: ICD-10-CM

## 2023-03-09 DIAGNOSIS — L23.89 ALLERGIC CONTACT DERMATITIS DUE TO FRAGRANCE: ICD-10-CM

## 2023-03-09 PROCEDURE — 99999 PR PBB SHADOW E&M-EST. PATIENT-LVL IV: CPT | Mod: PBBFAC,,, | Performed by: ALLERGY & IMMUNOLOGY

## 2023-03-09 PROCEDURE — 99213 OFFICE O/P EST LOW 20 MIN: CPT | Mod: S$GLB,,, | Performed by: ALLERGY & IMMUNOLOGY

## 2023-03-09 PROCEDURE — 99999 PR PBB SHADOW E&M-EST. PATIENT-LVL IV: ICD-10-PCS | Mod: PBBFAC,,, | Performed by: ALLERGY & IMMUNOLOGY

## 2023-03-09 PROCEDURE — 99213 PR OFFICE/OUTPT VISIT, EST, LEVL III, 20-29 MIN: ICD-10-PCS | Mod: S$GLB,,, | Performed by: ALLERGY & IMMUNOLOGY

## 2023-03-09 NOTE — PROGRESS NOTES
"Subjective:       Patient ID: Shahzad Feliz is a 62 y.o. female.    Chief Complaint:  Follow-up (72 hour patch test reading)    HPI today:  3-9/2023- She is here for her 72 hour patch test reading. She denies symptoms.      3-8-2023- She is her for patch test removal and 48 hour patch test reading.      3/6/2023-62 year old female  She is here for patch testing.  She reports pimples on her face related to eating shellfish two- three days ago. She denies anaphylaxis.              HPI 1/25/2023:    Back is better  She reports no rashes  She has avoided seafood.  She suspected seafood causes "pimples" on her face and head. Lesions last a few days.  No history of anaphylaxis to a food.  She suspects her daughter's dog causes a runny nose.              HPI 11/23/2022: 62 year old female referred by Dr. García for allergies  Symptoms_  "outbreaks on my back"  Hives- worse at night  Triggers- sweating cause her to itch and hives  Hives- arms and back  Last less than 24 hours  Raised erythema that itches  She places alcohol and bactriban on the area after the wipes the sweat.  She takes levocetirizine- daily, not helping  She denies tongue or throat swelling.  Duration- several years      Seafood- shrimp and crab- acne on face, itchy eyes  Fish does not cause symptoms;    Runny nose intermittently    Pcn- "blacked out"- 14 years of age  When I left the hospital they told me "you are allergic to all antibiotics".    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    Stopped smoking 12 years ago- 30 years- 1 pack   smokes.    Past Medical History:   Diagnosis Date    Bone spur     disc    Bulging disc     Hypertension         Family History:  Daughter has allergic rhinitis      Current Outpatient Medications on File Prior to Visit   Medication Sig Dispense Refill    ASCORBIC ACID (WILLIAM-C ORAL) Take by mouth.      atorvastatin (LIPITOR) 20 MG " tablet Take 1 tablet (20 mg total) by mouth once daily. (Patient taking differently: Take 20 mg by mouth once daily. Pt not taking) 90 tablet 0    azelastine (ASTELIN) 137 mcg (0.1 %) nasal spray by Nasal route.      azelastine (ASTELIN) 137 mcg (0.1 %) nasal spray 1 spray (137 mcg total) by Nasal route 2 (two) times daily. 20 mL 5    b complex vitamins capsule Take 1 capsule by mouth once daily.      black cohosh 20 mg Tab Take by mouth.      cholecalciferol, vitamin D3, 1,000 unit/spray SpSn Place 2,000 Units under the tongue.       fluticasone propionate (FLONASE) 50 mcg/actuation nasal spray 2 sprays (100 mcg total) by Each Nostril route once daily. 18.2 mL 6    GLUTAMINE ORAL Take by mouth daily as needed (for dyspepsia).      levocetirizine (XYZAL) 5 MG tablet Take 1 tablet (5 mg total) by mouth once daily. 90 tablet 3    milk thistle 150 mg Cap Take by mouth 2 (two) times a day.      multivitamin (THERAGRAN) per tablet Take 1 tablet by mouth once daily.      mupirocin (BACTROBAN) 2 % ointment APPLY EXTERNALLY TO THE AFFECTED AREA THREE TIMES DAILY 22 g 5    niacin (SLO-NIACIN) 500 mg tablet Take 500 mg by mouth 2 (two) times daily with meals.      triamcinolone acetonide 0.1% (KENALOG) 0.1 % cream Apply topically 2 (two) times daily. 80 g 2    triamterene-hydrochlorothiazide 37.5-25 mg (MAXZIDE-25) 37.5-25 mg per tablet Take 1 tablet by mouth once daily. 90 tablet 3    TUMERIC-GING-OLIVE-OREG-CAPRYL ORAL Take by mouth.      EPINEPHrine (EPIPEN) 0.3 mg/0.3 mL AtIn Inject 0.3 mLs (0.3 mg total) into the muscle once. for 1 dose 2 each 3    gabapentin (NEURONTIN) 300 MG capsule Take 1 capsule (300 mg total) by mouth every evening. Take 45 min to 1 hour before bed as may cause drowsiness 30 capsule 1     No current facility-administered medications on file prior to visit.        Review of patient's allergies indicates:   Allergen Reactions    Wasp venom      Large local swelling, no systemic symptoms    Cobalt       Positive via patch testing      Gold salts      Positive via patch testing    Grass pollen-june grass standard Hives and Itching    Latex, natural rubber     Mold     Nickel sutures [surgical stainless steel]      Patch test positive    Pcn [penicillins] Other (See Comments)     Passed out and seizures    Thimerosal      Patch test positive   .    Environmental History: Pets in the home: dogs (1).  Tobacco Smoke in Home: no  Review of Systems   Constitutional:  Negative for chills and fever.   HENT:  Negative for congestion and rhinorrhea.    Eyes:  Negative for discharge and itching.   Respiratory:  Negative for cough, shortness of breath and wheezing.    Cardiovascular:  Negative for chest pain and leg swelling.   Gastrointestinal:  Negative for abdominal distention, abdominal pain, nausea and vomiting.   Skin:  Positive for rash. Negative for wound.   Allergic/Immunologic: Positive for environmental allergies and food allergies.   Neurological:  Negative for facial asymmetry and speech difficulty.   Hematological:  Negative for adenopathy. Bruises/bleeds easily.   Psychiatric/Behavioral:  Negative for behavioral problems and suicidal ideas.       Objective:    Physical Exam  Vitals reviewed.   Constitutional:       General: She is not in acute distress.     Appearance: Normal appearance. She is well-developed. She is not ill-appearing, toxic-appearing or diaphoretic.   HENT:      Head: Normocephalic and atraumatic.      Mouth/Throat:      Pharynx: No oropharyngeal exudate or posterior oropharyngeal erythema.   Eyes:      General: No scleral icterus.        Right eye: No discharge.         Left eye: No discharge.   Neck:      Thyroid: No thyromegaly.   Cardiovascular:      Rate and Rhythm: Normal rate and regular rhythm.      Heart sounds: Normal heart sounds. No murmur heard.    No friction rub. No gallop.   Pulmonary:      Effort: Pulmonary effort is normal. No respiratory distress.      Breath sounds: Normal  breath sounds. No stridor. No wheezing, rhonchi or rales.   Chest:      Chest wall: No tenderness.   Abdominal:      General: Bowel sounds are normal. There is no distension.      Palpations: Abdomen is soft. There is no mass.      Tenderness: There is no abdominal tenderness. There is no guarding or rebound.      Hernia: No hernia is present.   Musculoskeletal:         General: No swelling, tenderness, deformity or signs of injury. Normal range of motion.      Cervical back: Normal range of motion and neck supple. No rigidity or tenderness. No muscular tenderness.   Lymphadenopathy:      Cervical: No cervical adenopathy.   Skin:     General: Skin is warm.      Coloration: Skin is not jaundiced or pale.      Findings: No bruising or erythema.   Neurological:      Mental Status: She is alert and oriented to person, place, and time.      Gait: Gait normal.   Psychiatric:         Mood and Affect: Mood normal.         Behavior: Behavior normal.         Thought Content: Thought content normal.         Judgment: Judgment normal.          History of possible allergic contact dermatitis.  Here today for 72 hour patch test read.  Instructions have been reviewed with the patient.      Denies recent oral corticosteroid intake.  No involvement of back.     Pt noted to be positive to the following allergens:      Nickel  Gold Sodium Thiosulfate  Thimerosal  Cobalt Dichloride  Fragrance mix        Assessment:       1. Nickel allergy    2. Allergic contact dermatitis due to metals    3. Allergic contact dermatitis due to fragrance    4. Allergic contact dermatitis due to chemical                 Plan:       Nickel allergy    Allergic contact dermatitis due to metals    Allergic contact dermatitis due to fragrance    Allergic contact dermatitis due to chemical        Recommend avoidance of the products that were positive via patch testing any foods or substances that cause symptoms.    RTC 6 months or sooner, if needed               SEBASTIAN DANIELSON spent a total of 22 minutes on the day of the visit.This includes face to face time and non-face to face time preparing to see the patient (eg, review of tests), obtaining and/or reviewing separately obtained history, documenting clinical information in the electronic or other health record, independently interpreting results and communicating results to the patient/family/caregiver, or care coordinator.

## 2023-04-11 ENCOUNTER — OFFICE VISIT (OUTPATIENT)
Dept: OBSTETRICS AND GYNECOLOGY | Facility: CLINIC | Age: 63
End: 2023-04-11
Payer: COMMERCIAL

## 2023-04-11 ENCOUNTER — HOSPITAL ENCOUNTER (OUTPATIENT)
Dept: RADIOLOGY | Facility: HOSPITAL | Age: 63
Discharge: HOME | End: 2023-04-11
Attending: FAMILY MEDICINE
Payer: COMMERCIAL

## 2023-04-11 VITALS
WEIGHT: 178.56 LBS | BODY MASS INDEX: 25 KG/M2 | WEIGHT: 179.25 LBS | DIASTOLIC BLOOD PRESSURE: 78 MMHG | BODY MASS INDEX: 25.1 KG/M2 | SYSTOLIC BLOOD PRESSURE: 130 MMHG | HEIGHT: 71 IN | HEIGHT: 71 IN

## 2023-04-11 DIAGNOSIS — R14.0 ABDOMINAL BLOATING: ICD-10-CM

## 2023-04-11 DIAGNOSIS — Z12.31 SCREENING MAMMOGRAM, ENCOUNTER FOR: ICD-10-CM

## 2023-04-11 DIAGNOSIS — Z12.4 SCREENING FOR CERVICAL CANCER: ICD-10-CM

## 2023-04-11 DIAGNOSIS — R10.2 PELVIC PAIN: ICD-10-CM

## 2023-04-11 DIAGNOSIS — Z01.419 ENCOUNTER FOR GYNECOLOGICAL EXAMINATION (GENERAL) (ROUTINE) WITHOUT ABNORMAL FINDINGS: Primary | ICD-10-CM

## 2023-04-11 DIAGNOSIS — Z12.31 ENCOUNTER FOR SCREENING MAMMOGRAM FOR MALIGNANT NEOPLASM OF BREAST: ICD-10-CM

## 2023-04-11 DIAGNOSIS — N95.2 VAGINAL ATROPHY: ICD-10-CM

## 2023-04-11 DIAGNOSIS — N94.10 DYSPAREUNIA, FEMALE: ICD-10-CM

## 2023-04-11 PROCEDURE — 77067 SCR MAMMO BI INCL CAD: CPT | Mod: 26,,, | Performed by: RADIOLOGY

## 2023-04-11 PROCEDURE — 77063 MAMMO DIGITAL SCREENING BILAT WITH TOMO: ICD-10-PCS | Mod: 26,,, | Performed by: RADIOLOGY

## 2023-04-11 PROCEDURE — 99386 PR PREVENTIVE VISIT,NEW,40-64: ICD-10-PCS | Mod: S$GLB,,, | Performed by: OBSTETRICS & GYNECOLOGY

## 2023-04-11 PROCEDURE — 99999 PR PBB SHADOW E&M-EST. PATIENT-LVL IV: CPT | Mod: PBBFAC,,, | Performed by: OBSTETRICS & GYNECOLOGY

## 2023-04-11 PROCEDURE — 99386 PREV VISIT NEW AGE 40-64: CPT | Mod: S$GLB,,, | Performed by: OBSTETRICS & GYNECOLOGY

## 2023-04-11 PROCEDURE — 77067 MAMMO DIGITAL SCREENING BILAT WITH TOMO: ICD-10-PCS | Mod: 26,,, | Performed by: RADIOLOGY

## 2023-04-11 PROCEDURE — 99999 PR PBB SHADOW E&M-EST. PATIENT-LVL IV: ICD-10-PCS | Mod: PBBFAC,,, | Performed by: OBSTETRICS & GYNECOLOGY

## 2023-04-11 PROCEDURE — 77063 BREAST TOMOSYNTHESIS BI: CPT | Mod: 26,,, | Performed by: RADIOLOGY

## 2023-04-11 PROCEDURE — 77067 SCR MAMMO BI INCL CAD: CPT | Mod: TC

## 2023-04-11 RX ORDER — ESTRADIOL 0.1 MG/G
CREAM VAGINAL
Qty: 42.5 G | Refills: 1 | Status: SHIPPED | OUTPATIENT
Start: 2023-04-11 | End: 2023-06-05

## 2023-04-11 NOTE — PROGRESS NOTES
Subjective:       Patient ID: Shahzad Feliz is a 62 y.o. female.    Chief Complaint:  Well Woman      History of Present Illness  HPI  Annual Exam-Postmenopausal  Patient presents for annual exam. The patient c/o feels she cannot empty bladder--feels increased bloating in lower abdomen; feels only urinates small amounts a time--feels this is happening more over  the last 3 months; denies leak of urine ; PT has helped with this; last colonoscopy . The patient is sexually active.--occas pelvic pain, dryness; no use of lubricant;  GYN screening history: last pap: was normal and patient does not recall when last pap was and last mammogram: approximate date  and was normal. The patient is not currently taking hormone replacement therapy. Patient denies post-menopausal vaginal bleeding. The patient wears seatbelts: yes. The patient participates in regular exercise: yes. Yoga stretching--2x/wk; walking ; Has the patient ever been transfused or tattooed?: no. The patient reports that there is not domestic violence in her life.  No problems sleeping; difficulty falling asleep  Reports hot flushes improved with metabalance (daughter is a naturopathic )    GYN & OB History  No LMP recorded. Patient has had a hysterectomy.   Date of Last Pap: No result found    OB History    Para Term  AB Living   5 4 4   1 4   SAB IAB Ectopic Multiple Live Births   1       4      # Outcome Date GA Lbr Demetrius/2nd Weight Sex Delivery Anes PTL Lv   5 SAB               Complications: Miscarriage   4 Term      Vag-Spont   ALBA   3 Term      Vag-Spont   ALBA   2 Term      Vag-Spont   ALBA   1 Term      Vag-Spont   ALBA       Review of Systems  Review of Systems   Genitourinary:  Positive for dyspareunia and pelvic pain.   All other systems reviewed and are negative.        Objective:      Physical Exam:   Constitutional: She is oriented to person, place, and time. She appears well-developed and well-nourished.     Eyes: Pupils are  equal, round, and reactive to light. Conjunctivae and EOM are normal.      Pulmonary/Chest: Effort normal. Right breast exhibits no mass, no nipple discharge, no skin change and no tenderness. Left breast exhibits no mass, no nipple discharge, no skin change and no tenderness. Breasts are symmetrical.        Abdominal: Soft.     Genitourinary:    Vagina and rectum normal.      Pelvic exam was performed with patient supine.   The external female genitalia was normal.   Labial bartholins normal.There is an absent right adnexa and an absent left adnexa. Vaginal cuff normal.  No erythema,  no vaginal discharge, bleeding, rectocele, cystocele or unspecified prolapse of vaginal walls in the vagina. Vaginal atrophy noted. Cervix is absent.   pap smear not completedUterus is absent. Normal urethral meatus.Urethra findings: no urethral massBladder findings: no bladder distention and no bladder tenderness          Musculoskeletal: Normal range of motion and moves all extremeties.       Neurological: She is alert and oriented to person, place, and time.    Skin: Skin is warm.    Psychiatric: She has a normal mood and affect. Her behavior is normal.         Assessment:        1. Encounter for gynecological examination (general) (routine) without abnormal findings    2. Vaginal atrophy    3. Pelvic pain    4. Screening mammogram, encounter for    5. Screening for cervical cancer    6. Dyspareunia, female    7. Abdominal bloating               Plan:      Continue annual well woman exam.  Pap not indicated due to hx of nml pap and hx of beverly   Accepts trial of vaginal estrogen  Pelvic sono to eval fullness/pressure  If despite estrogen and neg pelvic sono still with symptoms; consider flomax  Osteoporosis prevention; 1200mg calcium/d with source of vitamin d    Continue diet, exercise, weight loss   mammo ordered, continue yearly until age 74    Consider bmd next year

## 2023-04-14 ENCOUNTER — HOSPITAL ENCOUNTER (OUTPATIENT)
Dept: RADIOLOGY | Facility: HOSPITAL | Age: 63
Discharge: HOME | End: 2023-04-14
Attending: OBSTETRICS & GYNECOLOGY
Payer: COMMERCIAL

## 2023-04-14 DIAGNOSIS — R10.2 PELVIC PAIN: ICD-10-CM

## 2023-04-14 DIAGNOSIS — R14.0 ABDOMINAL BLOATING: ICD-10-CM

## 2023-04-14 PROCEDURE — 76830 US PELVIS COMP WITH TRANSVAG NON-OB (XPD): ICD-10-PCS | Mod: 26,,, | Performed by: RADIOLOGY

## 2023-04-14 PROCEDURE — 76856 US EXAM PELVIC COMPLETE: CPT | Mod: 26,,, | Performed by: RADIOLOGY

## 2023-04-14 PROCEDURE — 76856 US PELVIS COMP WITH TRANSVAG NON-OB (XPD): ICD-10-PCS | Mod: 26,,, | Performed by: RADIOLOGY

## 2023-04-14 PROCEDURE — 76830 TRANSVAGINAL US NON-OB: CPT | Mod: 26,,, | Performed by: RADIOLOGY

## 2023-04-14 PROCEDURE — 76856 US EXAM PELVIC COMPLETE: CPT | Mod: TC

## 2023-04-17 NOTE — PROGRESS NOTES
The pelvic sono results are available for review  There pelvic sono is normal  There are no pelvic masses to explain your discomfort
no

## 2023-04-28 NOTE — TELEPHONE ENCOUNTER
----- Message from Stone Mclean sent at 5/7/2018  3:37 PM CDT -----  Pt is requesting a call from nurse to discuss she has swelling in tail bone.          Please shanita pt back at [phone]   28-Apr-2023 21:57

## 2023-05-17 RX ORDER — FLUTICASONE PROPIONATE 50 MCG
SPRAY, SUSPENSION (ML) NASAL
Qty: 16 ML | Refills: 6 | Status: SHIPPED | OUTPATIENT
Start: 2023-05-17 | End: 2024-02-20

## 2023-06-05 DIAGNOSIS — N95.2 VAGINAL ATROPHY: ICD-10-CM

## 2023-06-05 RX ORDER — ESTRADIOL 0.1 MG/G
CREAM VAGINAL
Qty: 42.5 G | Refills: 1 | Status: SHIPPED | OUTPATIENT
Start: 2023-06-05 | End: 2023-08-08

## 2023-08-04 DIAGNOSIS — J30.89 ALLERGIC RHINITIS DUE TO MOLD: ICD-10-CM

## 2023-08-04 RX ORDER — AZELASTINE 1 MG/ML
1 SPRAY, METERED NASAL 2 TIMES DAILY
Qty: 12 ML | Refills: 1 | Status: SHIPPED | OUTPATIENT
Start: 2023-08-04

## 2023-08-06 DIAGNOSIS — N95.2 VAGINAL ATROPHY: ICD-10-CM

## 2023-08-08 RX ORDER — ESTRADIOL 0.1 MG/G
CREAM VAGINAL
Qty: 42.5 G | Refills: 1 | Status: SHIPPED | OUTPATIENT
Start: 2023-08-08

## 2023-10-16 ENCOUNTER — OFFICE VISIT (OUTPATIENT)
Dept: ALLERGY | Facility: CLINIC | Age: 63
End: 2023-10-16
Payer: COMMERCIAL

## 2023-10-16 ENCOUNTER — LAB VISIT (OUTPATIENT)
Dept: LAB | Facility: HOSPITAL | Age: 63
End: 2023-10-16
Attending: ALLERGY & IMMUNOLOGY
Payer: COMMERCIAL

## 2023-10-16 VITALS
SYSTOLIC BLOOD PRESSURE: 134 MMHG | WEIGHT: 179.88 LBS | DIASTOLIC BLOOD PRESSURE: 87 MMHG | TEMPERATURE: 98 F | HEART RATE: 88 BPM | BODY MASS INDEX: 25.09 KG/M2

## 2023-10-16 DIAGNOSIS — K90.49 FOOD INTOLERANCE: ICD-10-CM

## 2023-10-16 DIAGNOSIS — Z88.0 PENICILLIN ALLERGY: ICD-10-CM

## 2023-10-16 DIAGNOSIS — L50.1 CHRONIC IDIOPATHIC URTICARIA: ICD-10-CM

## 2023-10-16 DIAGNOSIS — L50.1 CHRONIC IDIOPATHIC URTICARIA: Primary | ICD-10-CM

## 2023-10-16 DIAGNOSIS — J30.89 ALLERGIC RHINITIS DUE TO MOLD: ICD-10-CM

## 2023-10-16 LAB
ALBUMIN SERPL BCP-MCNC: 4.2 G/DL (ref 3.5–5.2)
ALP SERPL-CCNC: 76 U/L (ref 55–135)
ALT SERPL W/O P-5'-P-CCNC: 33 U/L (ref 10–44)
ANION GAP SERPL CALC-SCNC: 13 MMOL/L (ref 8–16)
AST SERPL-CCNC: 23 U/L (ref 10–40)
BASOPHILS # BLD AUTO: 0.05 K/UL (ref 0–0.2)
BASOPHILS NFR BLD: 0.7 % (ref 0–1.9)
BILIRUB SERPL-MCNC: 1.2 MG/DL (ref 0.1–1)
BUN SERPL-MCNC: 15 MG/DL (ref 8–23)
CALCIUM SERPL-MCNC: 10.3 MG/DL (ref 8.7–10.5)
CHLORIDE SERPL-SCNC: 103 MMOL/L (ref 95–110)
CO2 SERPL-SCNC: 29 MMOL/L (ref 23–29)
CREAT SERPL-MCNC: 0.8 MG/DL (ref 0.5–1.4)
DIFFERENTIAL METHOD: ABNORMAL
EOSINOPHIL # BLD AUTO: 0.2 K/UL (ref 0–0.5)
EOSINOPHIL NFR BLD: 3 % (ref 0–8)
ERYTHROCYTE [DISTWIDTH] IN BLOOD BY AUTOMATED COUNT: 13.4 % (ref 11.5–14.5)
EST. GFR  (NO RACE VARIABLE): >60 ML/MIN/1.73 M^2
GLUCOSE SERPL-MCNC: 132 MG/DL (ref 70–110)
HCT VFR BLD AUTO: 51.1 % (ref 37–48.5)
HGB BLD-MCNC: 16.4 G/DL (ref 12–16)
IMM GRANULOCYTES # BLD AUTO: 0.02 K/UL (ref 0–0.04)
IMM GRANULOCYTES NFR BLD AUTO: 0.3 % (ref 0–0.5)
LYMPHOCYTES # BLD AUTO: 3 K/UL (ref 1–4.8)
LYMPHOCYTES NFR BLD: 40.5 % (ref 18–48)
MCH RBC QN AUTO: 28.6 PG (ref 27–31)
MCHC RBC AUTO-ENTMCNC: 32.1 G/DL (ref 32–36)
MCV RBC AUTO: 89 FL (ref 82–98)
MONOCYTES # BLD AUTO: 0.7 K/UL (ref 0.3–1)
MONOCYTES NFR BLD: 8.9 % (ref 4–15)
NEUTROPHILS # BLD AUTO: 3.5 K/UL (ref 1.8–7.7)
NEUTROPHILS NFR BLD: 46.6 % (ref 38–73)
NRBC BLD-RTO: 0 /100 WBC
PLATELET # BLD AUTO: 240 K/UL (ref 150–450)
PMV BLD AUTO: 10.9 FL (ref 9.2–12.9)
POTASSIUM SERPL-SCNC: 4.2 MMOL/L (ref 3.5–5.1)
PROT SERPL-MCNC: 8.1 G/DL (ref 6–8.4)
RBC # BLD AUTO: 5.73 M/UL (ref 4–5.4)
SODIUM SERPL-SCNC: 145 MMOL/L (ref 136–145)
WBC # BLD AUTO: 7.39 K/UL (ref 3.9–12.7)

## 2023-10-16 PROCEDURE — 84165 PROTEIN E-PHORESIS SERUM: CPT | Mod: 26,,, | Performed by: PATHOLOGY

## 2023-10-16 PROCEDURE — 85025 COMPLETE CBC W/AUTO DIFF WBC: CPT | Performed by: ALLERGY & IMMUNOLOGY

## 2023-10-16 PROCEDURE — 36415 COLL VENOUS BLD VENIPUNCTURE: CPT | Performed by: ALLERGY & IMMUNOLOGY

## 2023-10-16 PROCEDURE — 80053 COMPREHEN METABOLIC PANEL: CPT | Performed by: ALLERGY & IMMUNOLOGY

## 2023-10-16 PROCEDURE — 84165 PATHOLOGIST INTERPRETATION SPE: ICD-10-PCS | Mod: 26,,, | Performed by: PATHOLOGY

## 2023-10-16 PROCEDURE — 84165 PROTEIN E-PHORESIS SERUM: CPT | Performed by: ALLERGY & IMMUNOLOGY

## 2023-10-16 PROCEDURE — 99214 OFFICE O/P EST MOD 30 MIN: CPT | Mod: S$GLB,,, | Performed by: ALLERGY & IMMUNOLOGY

## 2023-10-16 PROCEDURE — 99999 PR PBB SHADOW E&M-EST. PATIENT-LVL IV: ICD-10-PCS | Mod: PBBFAC,,, | Performed by: ALLERGY & IMMUNOLOGY

## 2023-10-16 PROCEDURE — 99214 PR OFFICE/OUTPT VISIT, EST, LEVL IV, 30-39 MIN: ICD-10-PCS | Mod: S$GLB,,, | Performed by: ALLERGY & IMMUNOLOGY

## 2023-10-16 PROCEDURE — 99999 PR PBB SHADOW E&M-EST. PATIENT-LVL IV: CPT | Mod: PBBFAC,,, | Performed by: ALLERGY & IMMUNOLOGY

## 2023-10-16 RX ORDER — DOXEPIN HYDROCHLORIDE 10 MG/1
10 CAPSULE ORAL NIGHTLY
Qty: 30 CAPSULE | Refills: 11 | Status: SHIPPED | OUTPATIENT
Start: 2023-10-16 | End: 2023-10-18

## 2023-10-16 NOTE — PROGRESS NOTES
"Subjective:       Patient ID: Shahzad Feliz is a 63 y.o. female.    Chief Complaint:  Follow-up (C/O hives on laying down at night, usually arms and back.  Usually 2 to 3 times a week.)    HPI today:  63 year old female with a history of  chronic Idiopathic/Spontaneous Urticaria, Allergic Rhinitis to mold. PCN allergy, Contact dermatitis    Scalp rash- improved    Allergic contact dermatitis- nickel, metal, fragrance,     She reports that laying down at night, she has hives on arms and back. Frequency has decreased.    Sweat causes her to itch     Xyzal daily      Shellfish- acne        Daughter is a holistic practitioner and would like liver disease ruled out.          HPI 11/23/2022: 62 year old female referred by Dr. García for allergies  Symptoms_  "outbreaks on my back"  Hives- worse at night  Triggers- sweating cause her to itch and hives  Hives- arms and back  Last less than 24 hours  Raised erythema that itches  She places alcohol and bactriban on the area after the wipes the sweat.  She takes levocetirizine- daily, not helping  She denies tongue or throat swelling.  Duration- several years      Seafood- shrimp and crab- acne on face, itchy eyes  Fish does not cause symptoms;    Runny nose intermittently    Pcn- "blacked out"- 14 years of age  When I left the hospital they told me "you are allergic to all antibiotics".    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    Stopped smoking 12 years ago- 30 years- 1 pack   smokes.    Past Medical History:   Diagnosis Date    Bone spur     disc    Bulging disc     Hypertension         Family History:  Daughter has allergic rhinitis      Current Outpatient Medications on File Prior to Visit   Medication Sig Dispense Refill    ASCORBIC ACID (WILLIAM-C ORAL) Take by mouth.      azelastine (ASTELIN) 137 mcg (0.1 %) nasal spray SPRAY 1 SPRAY BY NASAL ROUTE 2 TIMES DAILY. 12 mL 1    black " cohosh 20 mg Tab Take by mouth.      cholecalciferol, vitamin D3, 1,000 unit/spray SpSn Place 2,000 Units under the tongue.       estradioL (ESTRACE) 0.01 % (0.1 mg/gram) vaginal cream PLACE 1GRAM VAGINALLY EVERY EVENING FOR 14 DAYS, THEN 1GRAM TWICE A WEEK. 42.5 g 1    fluticasone propionate (FLONASE) 50 mcg/actuation nasal spray SPRAY 2 SPRAYS BY EACH NOSTRIL ROUTE ONCE DAILY. 16 mL 6    GLUTAMINE ORAL Take by mouth daily as needed (for dyspepsia).      levocetirizine (XYZAL) 5 MG tablet Take 1 tablet (5 mg total) by mouth once daily. 90 tablet 3    milk thistle 150 mg Cap Take by mouth 2 (two) times a day.      multivitamin (THERAGRAN) per tablet Take 1 tablet by mouth once daily.      mupirocin (BACTROBAN) 2 % ointment APPLY EXTERNALLY TO THE AFFECTED AREA THREE TIMES DAILY 22 g 5    niacin (SLO-NIACIN) 500 mg tablet Take 500 mg by mouth 2 (two) times daily with meals.      triamcinolone acetonide 0.1% (KENALOG) 0.1 % cream Apply topically 2 (two) times daily. 80 g 2    triamterene-hydrochlorothiazide 37.5-25 mg (MAXZIDE-25) 37.5-25 mg per tablet Take 1 tablet by mouth once daily. 90 tablet 3    TUMERIC-GING-OLIVE-OREG-CAPRYL ORAL Take by mouth.      atorvastatin (LIPITOR) 20 MG tablet Take 1 tablet (20 mg total) by mouth once daily. (Patient not taking: Reported on 10/16/2023) 90 tablet 0    azelastine (ASTELIN) 137 mcg (0.1 %) nasal spray by Nasal route.      b complex vitamins capsule Take 1 capsule by mouth once daily.      EPINEPHrine (EPIPEN) 0.3 mg/0.3 mL AtIn Inject 0.3 mLs (0.3 mg total) into the muscle once. for 1 dose 2 each 3    gabapentin (NEURONTIN) 300 MG capsule Take 1 capsule (300 mg total) by mouth every evening. Take 45 min to 1 hour before bed as may cause drowsiness 30 capsule 1     No current facility-administered medications on file prior to visit.        Review of patient's allergies indicates:   Allergen Reactions    Wasp venom      Large local swelling, no systemic symptoms    Cobalt       Positive via patch testing      Gold salts      Positive via patch testing    Grass pollen-june grass standard Hives and Itching    Latex, natural rubber     Mold     Nickel sutures [surgical stainless steel]      Patch test positive    Pcn [penicillins] Other (See Comments)     Passed out and seizures    Thimerosal      Patch test positive   .    Environmental History: Pets in the home: dogs (1).  Tobacco Smoke in Home: no  Review of Systems   Constitutional:  Negative for chills and fever.   HENT:  Negative for congestion and rhinorrhea.    Eyes:  Negative for discharge and itching.   Respiratory:  Negative for cough, shortness of breath and wheezing.    Cardiovascular:  Negative for chest pain and leg swelling.   Gastrointestinal:  Negative for abdominal distention, abdominal pain, nausea and vomiting.   Skin:  Positive for rash. Negative for wound.   Allergic/Immunologic: Positive for environmental allergies and food allergies.   Neurological:  Negative for facial asymmetry and speech difficulty.   Hematological:  Negative for adenopathy. Bruises/bleeds easily.   Psychiatric/Behavioral:  Negative for behavioral problems and suicidal ideas.         Objective:    Physical Exam  Vitals reviewed.   Constitutional:       General: She is not in acute distress.     Appearance: Normal appearance. She is well-developed. She is not ill-appearing, toxic-appearing or diaphoretic.   HENT:      Head: Normocephalic and atraumatic.      Mouth/Throat:      Pharynx: No oropharyngeal exudate or posterior oropharyngeal erythema.   Eyes:      General: No scleral icterus.        Right eye: No discharge.         Left eye: No discharge.   Neck:      Thyroid: No thyromegaly.   Cardiovascular:      Rate and Rhythm: Normal rate and regular rhythm.      Heart sounds: Normal heart sounds. No murmur heard.     No friction rub. No gallop.   Pulmonary:      Effort: Pulmonary effort is normal. No respiratory distress.      Breath sounds:  Normal breath sounds. No stridor. No wheezing, rhonchi or rales.   Chest:      Chest wall: No tenderness.   Abdominal:      General: Bowel sounds are normal. There is no distension.      Palpations: Abdomen is soft. There is no mass.      Tenderness: There is no abdominal tenderness. There is no guarding or rebound.      Hernia: No hernia is present.   Musculoskeletal:         General: No swelling, tenderness, deformity or signs of injury. Normal range of motion.      Cervical back: Normal range of motion and neck supple. No rigidity or tenderness. No muscular tenderness.   Lymphadenopathy:      Cervical: No cervical adenopathy.   Skin:     General: Skin is warm.      Coloration: Skin is not jaundiced or pale.      Findings: No bruising or erythema.   Neurological:      Mental Status: She is alert and oriented to person, place, and time.      Gait: Gait normal.   Psychiatric:         Mood and Affect: Mood normal.         Behavior: Behavior normal.         Thought Content: Thought content normal.         Judgment: Judgment normal.                  Assessment:       1. Chronic idiopathic urticaria    2. Allergic rhinitis due to mold    3. Food intolerance    4. Penicillin allergy                   Plan:       Chronic idiopathic urticaria  -     Comprehensive Metabolic Panel; Future; Expected date: 10/16/2023  -     CBC Auto Differential; Future; Expected date: 10/16/2023  -     PROTEIN ELECTROPHORESIS, SERUM; Future; Expected date: 10/16/2023  -     doxepin (SINEQUAN) 10 MG capsule; Take 1 capsule (10 mg total) by mouth every evening.  Dispense: 30 capsule; Refill: 11    Allergic rhinitis due to mold    Food intolerance    Penicillin allergy          Recommend the avoidance of the products and foods that have caused symptoms.    Labs that were requested were ordered.  Doxepin ordered.    Consider PCN allergy testing.    RTC 2-4 weeks or sooner, if needed              SEBASTIAN DANIELSON                          Problems Address                                                  Amount and/or Complexity                                                                      Risk       3           [] 2 or more self-limited or minor problems                      [] Limited                                                                        [] Low                  [] 1 stable chronic illness                                                  Any combination of the two                                               OTC drugs                  []Acute, uncomplicated illness or injury                            Review of prior external notes from unique source           Minor surgery with no risk factors                                                                                                               [] 1 []2  []3+                                                                                                              Review of results from each unique test                                                                                                               [] 1 []2  [] 3+                                                                                                              Order of each unique test                                                                                                               [] 1 []2  [] 3+                                                                                                              Or                                                                                                             [] Assessment requiring an independent historian      4            [] One or more chronic illness with exacerbation,              [] Moderate                                                                      [x] Moderate                 Progression, or side effects of treatment                            -test documents or independent historians                        Prescription drug  management                [x]  2 or more stable chronic illnesses                                    [] Independent interpretation of tests                              Minor surgery with identifiable risk                [] 1 undiagnosed new problem with uncertain prognosis    [x] Discussion or management of test results                    elective major surgery                [] 1 acute illness with                systemic symptoms                                                                                                                                                              [] 1 acute complicated injury                                                                                                                                          Elective major surgery                                                                                                                                                                                                                                                                                                                                                                                                  5            [] 1 or more chronic illnesses with severe exacerbation,     [] Extensive(two from below)                                         [] High                                                                                                               [] Independent interpretation of results                         Drug therapy requiring intensive                                                                                                               []Discussion of management or test interpretation           monitoring                                                                                                                                                                                                       Decision to de-escalate care                  [] 1 acute or chronic illness or injury that poses a threat                                                                                               Decision regarding hospitalization

## 2023-10-17 LAB
ALBUMIN SERPL ELPH-MCNC: 4.68 G/DL (ref 3.35–5.55)
ALPHA1 GLOB SERPL ELPH-MCNC: 0.27 G/DL (ref 0.17–0.41)
ALPHA2 GLOB SERPL ELPH-MCNC: 0.72 G/DL (ref 0.43–0.99)
B-GLOBULIN SERPL ELPH-MCNC: 1.09 G/DL (ref 0.5–1.1)
GAMMA GLOB SERPL ELPH-MCNC: 1.04 G/DL (ref 0.67–1.58)
PATHOLOGIST INTERPRETATION SPE: NORMAL
PROT SERPL-MCNC: 7.8 G/DL (ref 6–8.4)

## 2023-10-18 ENCOUNTER — OFFICE VISIT (OUTPATIENT)
Dept: INTERNAL MEDICINE | Facility: CLINIC | Age: 63
End: 2023-10-18
Payer: COMMERCIAL

## 2023-10-18 ENCOUNTER — HOSPITAL ENCOUNTER (OUTPATIENT)
Dept: RADIOLOGY | Facility: HOSPITAL | Age: 63
Discharge: HOME | End: 2023-10-18
Attending: FAMILY MEDICINE
Payer: COMMERCIAL

## 2023-10-18 VITALS
OXYGEN SATURATION: 100 % | SYSTOLIC BLOOD PRESSURE: 142 MMHG | DIASTOLIC BLOOD PRESSURE: 82 MMHG | HEART RATE: 96 BPM | HEIGHT: 71 IN | WEIGHT: 181.88 LBS | BODY MASS INDEX: 25.46 KG/M2

## 2023-10-18 DIAGNOSIS — M54.41 CHRONIC LOW BACK PAIN WITH RIGHT-SIDED SCIATICA, UNSPECIFIED BACK PAIN LATERALITY: ICD-10-CM

## 2023-10-18 DIAGNOSIS — J30.9 CHRONIC ALLERGIC RHINITIS: ICD-10-CM

## 2023-10-18 DIAGNOSIS — R06.02 SOB (SHORTNESS OF BREATH): ICD-10-CM

## 2023-10-18 DIAGNOSIS — Z00.00 ANNUAL PHYSICAL EXAM: Primary | ICD-10-CM

## 2023-10-18 DIAGNOSIS — R10.32 LEFT LOWER QUADRANT PAIN: ICD-10-CM

## 2023-10-18 DIAGNOSIS — I10 PRIMARY HYPERTENSION: ICD-10-CM

## 2023-10-18 DIAGNOSIS — E55.9 VITAMIN D DEFICIENCY DISEASE: ICD-10-CM

## 2023-10-18 DIAGNOSIS — G89.29 CHRONIC LOW BACK PAIN WITH RIGHT-SIDED SCIATICA, UNSPECIFIED BACK PAIN LATERALITY: ICD-10-CM

## 2023-10-18 PROCEDURE — 71046 XR CHEST PA AND LATERAL: ICD-10-PCS | Mod: 26,,, | Performed by: RADIOLOGY

## 2023-10-18 PROCEDURE — 99214 PR OFFICE/OUTPT VISIT, EST, LEVL IV, 30-39 MIN: ICD-10-PCS | Mod: S$GLB,,, | Performed by: FAMILY MEDICINE

## 2023-10-18 PROCEDURE — 71046 X-RAY EXAM CHEST 2 VIEWS: CPT | Mod: TC

## 2023-10-18 PROCEDURE — 71046 X-RAY EXAM CHEST 2 VIEWS: CPT | Mod: 26,,, | Performed by: RADIOLOGY

## 2023-10-18 PROCEDURE — 99214 OFFICE O/P EST MOD 30 MIN: CPT | Mod: S$GLB,,, | Performed by: FAMILY MEDICINE

## 2023-10-18 PROCEDURE — 99999 PR PBB SHADOW E&M-EST. PATIENT-LVL V: CPT | Mod: PBBFAC,,, | Performed by: FAMILY MEDICINE

## 2023-10-18 PROCEDURE — 99999 PR PBB SHADOW E&M-EST. PATIENT-LVL V: ICD-10-PCS | Mod: PBBFAC,,, | Performed by: FAMILY MEDICINE

## 2023-10-18 NOTE — PROGRESS NOTES
Subjective:       Patient ID: Shahzad Feliz is a 63 y.o. female.    Chief Complaint: Annual Exam    Annual exam.  Follow-up chronic allergic rhinitis hyperlipidemia chronic back pain with right-sided sciatica hypertension vitamin-D deficiency.  She is also followed by allergy for allergic rhinitis and urticaria.  She reports she upper start for infection 1 month ago.  Several COVID tests were negative.  She had some initial shortness of breath with activity has improved.  She reports that she is had about 3 years duration of occasional left lower quadrant abdominal pain.  This seems to be worsened with bending over.  She had a colonoscopy done 2022.  She had a pelvic ultrasound that was negative up-to-date.  She does have chronic low back pain with right-sided sciatica.  She denies chest pain palpitations or edema.        Review of Systems   Constitutional:  Negative for activity change, appetite change, chills, fatigue, fever and unexpected weight change.   HENT:  Positive for congestion.    Respiratory:  Positive for shortness of breath. Negative for cough, chest tightness and wheezing.    Cardiovascular:  Negative for chest pain, palpitations and leg swelling.   Gastrointestinal:  Negative for abdominal distention, abdominal pain, constipation and diarrhea.   Genitourinary:  Negative for difficulty urinating, dysuria, frequency, hematuria and urgency.   Musculoskeletal:  Positive for back pain.   Neurological:  Negative for dizziness, weakness, light-headedness and headaches.   Psychiatric/Behavioral:  The patient is not nervous/anxious.        Objective:      Physical Exam  Constitutional:       General: She is not in acute distress.     Appearance: She is not ill-appearing or diaphoretic.   HENT:      Right Ear: Tympanic membrane normal.      Left Ear: Tympanic membrane normal.   Eyes:      Conjunctiva/sclera: Conjunctivae normal.   Neck:      Comments: Normal thyroid 2+ carotids  Cardiovascular:       Rate and Rhythm: Normal rate and regular rhythm.      Heart sounds: No murmur heard.     No gallop.   Pulmonary:      Effort: Pulmonary effort is normal. No respiratory distress.      Breath sounds: No wheezing, rhonchi or rales.   Abdominal:      General: There is no distension.      Palpations: Abdomen is soft. There is no mass.      Tenderness: There is no abdominal tenderness.   Lymphadenopathy:      Cervical: No cervical adenopathy.   Skin:     General: Skin is warm and dry.      Coloration: Skin is not pale.      Findings: No erythema.   Neurological:      Mental Status: She is alert.   Psychiatric:         Mood and Affect: Mood normal.         Behavior: Behavior normal.         Lab Visit on 10/16/2023   Component Date Value Ref Range Status    Sodium 10/16/2023 145  136 - 145 mmol/L Final    Potassium 10/16/2023 4.2  3.5 - 5.1 mmol/L Final    Chloride 10/16/2023 103  95 - 110 mmol/L Final    CO2 10/16/2023 29  23 - 29 mmol/L Final    Glucose 10/16/2023 132 (H)  70 - 110 mg/dL Final    BUN 10/16/2023 15  8 - 23 mg/dL Final    Creatinine 10/16/2023 0.8  0.5 - 1.4 mg/dL Final    Calcium 10/16/2023 10.3  8.7 - 10.5 mg/dL Final    Total Protein 10/16/2023 8.1  6.0 - 8.4 g/dL Final    Albumin 10/16/2023 4.2  3.5 - 5.2 g/dL Final    Total Bilirubin 10/16/2023 1.2 (H)  0.1 - 1.0 mg/dL Final    Alkaline Phosphatase 10/16/2023 76  55 - 135 U/L Final    AST 10/16/2023 23  10 - 40 U/L Final    ALT 10/16/2023 33  10 - 44 U/L Final    eGFR 10/16/2023 >60.0  >60 mL/min/1.73 m^2 Final    Anion Gap 10/16/2023 13  8 - 16 mmol/L Final    WBC 10/16/2023 7.39  3.90 - 12.70 K/uL Final    RBC 10/16/2023 5.73 (H)  4.00 - 5.40 M/uL Final    Hemoglobin 10/16/2023 16.4 (H)  12.0 - 16.0 g/dL Final    Hematocrit 10/16/2023 51.1 (H)  37.0 - 48.5 % Final    MCV 10/16/2023 89  82 - 98 fL Final    MCH 10/16/2023 28.6  27.0 - 31.0 pg Final    MCHC 10/16/2023 32.1  32.0 - 36.0 g/dL Final    RDW 10/16/2023 13.4  11.5 - 14.5 % Final    Platelets  10/16/2023 240  150 - 450 K/uL Final    MPV 10/16/2023 10.9  9.2 - 12.9 fL Final    Immature Granulocytes 10/16/2023 0.3  0.0 - 0.5 % Final    Gran # (ANC) 10/16/2023 3.5  1.8 - 7.7 K/uL Final    Immature Grans (Abs) 10/16/2023 0.02  0.00 - 0.04 K/uL Final    Lymph # 10/16/2023 3.0  1.0 - 4.8 K/uL Final    Mono # 10/16/2023 0.7  0.3 - 1.0 K/uL Final    Eos # 10/16/2023 0.2  0.0 - 0.5 K/uL Final    Baso # 10/16/2023 0.05  0.00 - 0.20 K/uL Final    nRBC 10/16/2023 0  0 /100 WBC Final    Gran % 10/16/2023 46.6  38.0 - 73.0 % Final    Lymph % 10/16/2023 40.5  18.0 - 48.0 % Final    Mono % 10/16/2023 8.9  4.0 - 15.0 % Final    Eosinophil % 10/16/2023 3.0  0.0 - 8.0 % Final    Basophil % 10/16/2023 0.7  0.0 - 1.9 % Final    Differential Method 10/16/2023 Automated   Final    Protein, Serum 10/16/2023 7.8  6.0 - 8.4 g/dL Final    Albumin 10/16/2023 4.68  3.35 - 5.55 g/dL Final    Alpha-1 10/16/2023 0.27  0.17 - 0.41 g/dL Final    Alpha-2 10/16/2023 0.72  0.43 - 0.99 g/dL Final    Beta 10/16/2023 1.09  0.50 - 1.10 g/dL Final    Gamma 10/16/2023 1.04  0.67 - 1.58 g/dL Final    Pathologist Interpretation SPE 10/16/2023 REVIEWED   Final     Assessment:       1. Annual physical exam    2. Left lower quadrant pain    3. Primary hypertension    4. SOB (shortness of breath)    5. Chronic allergic rhinitis    6. Vitamin D deficiency disease    7. Chronic low back pain with right-sided sciatica, unspecified back pain laterality        Plan:     Blood pressure elevated.  Recommend checking pressure at home daily and follow-up in 6 weeks.  CT abdomen pelvis was ordered for completeness with 3 years duration of left lower quadrant abdominal pain.  Routine lab was ordered.  Chest X ordered for shortness of breath associated recent upper respiratory infection.  Follow-up in 6 weeks.    Annual physical exam    Left lower quadrant pain  -     CT Abdomen Pelvis With Contrast; Future; Expected date: 10/18/2023    Primary hypertension  -      CBC Auto Differential; Future; Expected date: 10/18/2023  -     Urinalysis; Future; Expected date: 10/18/2023  -     Comprehensive Metabolic Panel; Future; Expected date: 10/18/2023  -     Hemoglobin A1C; Future; Expected date: 10/18/2023  -     Lipid Panel; Future; Expected date: 10/18/2023  -     TSH; Future; Expected date: 10/18/2023    SOB (shortness of breath)  -     X-Ray Chest PA And Lateral; Future; Expected date: 10/18/2023    Chronic allergic rhinitis    Vitamin D deficiency disease    Chronic low back pain with right-sided sciatica, unspecified back pain laterality

## 2023-10-25 ENCOUNTER — HOSPITAL ENCOUNTER (OUTPATIENT)
Dept: RADIOLOGY | Facility: HOSPITAL | Age: 63
Discharge: HOME | End: 2023-10-25
Attending: FAMILY MEDICINE
Payer: COMMERCIAL

## 2023-10-25 DIAGNOSIS — R10.32 LEFT LOWER QUADRANT PAIN: ICD-10-CM

## 2023-10-25 PROCEDURE — 74177 CT ABD & PELVIS W/CONTRAST: CPT | Mod: TC

## 2023-10-25 PROCEDURE — 25500020 PHARM REV CODE 255: Performed by: FAMILY MEDICINE

## 2023-10-25 PROCEDURE — 74177 CT ABDOMEN PELVIS WITH CONTRAST: ICD-10-PCS | Mod: 26,,, | Performed by: RADIOLOGY

## 2023-10-25 PROCEDURE — A9698 NON-RAD CONTRAST MATERIALNOC: HCPCS | Performed by: FAMILY MEDICINE

## 2023-10-25 PROCEDURE — 74177 CT ABD & PELVIS W/CONTRAST: CPT | Mod: 26,,, | Performed by: RADIOLOGY

## 2023-10-25 RX ADMIN — IOHEXOL 100 ML: 350 INJECTION, SOLUTION INTRAVENOUS at 01:10

## 2023-10-25 RX ADMIN — IOHEXOL 1000 ML: 12 SOLUTION ORAL at 12:10

## 2023-10-26 RX ORDER — CIPROFLOXACIN 500 MG/1
500 TABLET ORAL 2 TIMES DAILY
Qty: 20 TABLET | Refills: 0 | Status: SHIPPED | OUTPATIENT
Start: 2023-10-26 | End: 2024-02-06 | Stop reason: ALTCHOICE

## 2023-10-26 RX ORDER — METRONIDAZOLE 500 MG/1
500 TABLET ORAL 3 TIMES DAILY
Qty: 30 TABLET | Refills: 0 | Status: SHIPPED | OUTPATIENT
Start: 2023-10-26

## 2023-10-28 NOTE — TELEPHONE ENCOUNTER
No care due was identified.  [facility] Embedded Care Due Messages. Reference number: [id number].   10/28/2023 3:41:06 PM CDT

## 2023-10-29 RX ORDER — ATORVASTATIN CALCIUM 20 MG/1
20 TABLET, FILM COATED ORAL
Qty: 30 TABLET | Refills: 2 | OUTPATIENT
Start: 2023-10-29

## 2023-10-29 NOTE — TELEPHONE ENCOUNTER
Refill Decision Note   Shahzad Feliz  is requesting a refill authorization.  Brief Assessment and Rationale for Refill:  Quick Discontinue     Medication Therapy Plan:  The original prescription was discontinued on 10/18/2023 by Santi Zuñiga MD.      Comments:     Note composed:4:00 AM 10/29/2023

## 2023-10-31 DIAGNOSIS — R60.0 EDEMA OF EXTREMITIES: ICD-10-CM

## 2023-10-31 RX ORDER — TRIAMTERENE/HYDROCHLOROTHIAZID 37.5-25 MG
1 TABLET ORAL
Qty: 90 TABLET | Refills: 3 | Status: SHIPPED | OUTPATIENT
Start: 2023-10-31 | End: 2023-11-29

## 2023-10-31 NOTE — TELEPHONE ENCOUNTER
No care due was identified.  [facility] Embedded Care Due Messages. Reference number: [id number].   10/31/2023 12:26:23 AM CDT

## 2023-10-31 NOTE — TELEPHONE ENCOUNTER
Refill Routing Note   Medication(s) are not appropriate for processing by [facility] for the following reason(s):      Required vitals abnormal    [facility] action(s):  Defer Care Due:  None identified     Medication Therapy Plan: BP 10/18/23 (!) 142/82      Appointments  past 12m or future 3m with PCP    Date Provider   Last Visit   10/18/2023 Santi Zuñiga MD   Next Visit   Visit date not found Santi Zuñiga MD   ED visits in past 90 days: 0        Note composed:11:48 AM 10/31/2023

## 2023-11-15 DIAGNOSIS — L30.9 DERMATITIS: ICD-10-CM

## 2023-11-15 RX ORDER — TRIAMCINOLONE ACETONIDE 1 MG/G
CREAM TOPICAL 2 TIMES DAILY
Qty: 80 G | Refills: 2 | Status: SHIPPED | OUTPATIENT
Start: 2023-11-15

## 2023-11-27 NOTE — PROGRESS NOTES
Shahzad Feliz  11/27/2023  [id number]    Santi Zuñiga MD  Patient Care Team:  Santi Zuñiga MD as PCP - General (Family Medicine)  Broderick Arriola MD as Consulting Physician (Gastroenterology)  Maria Guadalupe Lord MD as Consulting Physician (Gynecology)  Sai Gunderson MD as Consulting Physician (Otolaryngology)  Paulette Lorenzo MD as Consulting Physician (Ophthalmology)  Inocencio Mcintyre MD as Consulting Physician (Radiology)          Visit Type:a scheduled routine follow-up visit    Chief Complaint:  Chief Complaint   Patient presents with    Follow-up        History of Present Illness:    63 year old female presents today for a 6 week follow up       HLD  Total 271      Dr. Zuñiga called in Lipitor  Pt did not start the medication  States her sisters couldn't tolerate the Lipitor so she did not want to start it    HTN  Home   Systolic BP is in the 130s    CT Ab showed   Abdominal pelvic CT with some mild thickening of the colon suggesting diverticulitis.   Started on cipro and flagyl    At visit Abd pain has resolved       History:  Past Medical History:   Diagnosis Date    Bone spur     disc    Bulging disc     Hypertension      Past Surgical History:   Procedure Laterality Date    BREAST BIOPSY Left     COLONOSCOPY N/A 07/25/2019    Procedure: COLONOSCOPY;  Surgeon: Domenico Howard MD;  Location: [facility] ENDO;  Service: Endoscopy;  Laterality: N/A;    COLONOSCOPY N/A 12/16/2022    Procedure: COLONOSCOPY;  Surgeon: Tamiko Jack MD;  Location: [facility] ENDO;  Service: Endoscopy;  Laterality: N/A;    COLONOSCOPY W/ BIOPSIES AND POLYPECTOMY  12/17/2014    tubular adenoma, hyperplastic polyp, repeat 3 years    HYSTERECTOMY      OVARIAN CYST REMOVAL       Family History   Problem Relation Age of Onset    Ovarian cancer Maternal Aunt     Breast cancer Maternal Uncle     Breast cancer Maternal Grandmother      Social History     Socioeconomic History    Marital status:     Number of  children: 4   Occupational History    Occupation: , maintain DB      Comment: contract to [place] [employer]   Tobacco Use    Smoking status: Former    Smokeless tobacco: Never   Substance and Sexual Activity    Alcohol use: Not Currently    Drug use: No    Sexual activity: Yes     Partners: Male     Patient Active Problem List   Diagnosis    Allergic rhinitis    Primary hypertension    Acute chest pain    SOB (shortness of breath)    FH: CAD (coronary artery disease)    Chronic allergic rhinitis    Annual physical exam    Immunization deficiency    Vitamin D deficiency disease    Hyperlipidemia    Dermatitis    Edema of extremities    Chronic low back pain with right-sided sciatica    Skin lesions    Hearing loss of left ear    Urticaria    Personal history of colonic polyps    Left lower quadrant pain     Review of patient's allergies indicates:   Allergen Reactions    Wasp venom      Large local swelling, no systemic symptoms    [place]      Positive via patch testing      Gold salts      Positive via patch testing    Grass pollen-june grass standard Hives and Itching    Latex, natural rubber     Mold     Nickel sutures [surgical stainless steel]      Patch test positive    Pcn [penicillins] Other (See Comments)     Passed out and seizures    Thimerosal      Patch test positive       The following were reviewed at this visit: active problem list, medication list, allergies, family history, social history, and health maintenance.    Medications:  Current Outpatient Medications on File Prior to Visit   Medication Sig Dispense Refill    ASCORBIC ACID (WILLIAM-C ORAL) Take by mouth.      azelastine (ASTELIN) 137 mcg (0.1 %) nasal spray SPRAY 1 SPRAY BY NASAL ROUTE 2 TIMES DAILY. 12 mL 1    b complex vitamins capsule Take 1 capsule by mouth once daily.      black cohosh 20 mg Tab Take by mouth.      cholecalciferol, vitamin D3, 1,000 unit/spray SpSn Place 2,000 Units under the tongue.       ciprofloxacin  HCl (CIPRO) 500 MG tablet Take 1 tablet (500 mg total) by mouth 2 (two) times a day. 20 tablet 0    EPINEPHrine (EPIPEN) 0.3 mg/0.3 mL AtIn Inject 0.3 mLs (0.3 mg total) into the muscle once. for 1 dose 2 each 3    estradioL (ESTRACE) 0.01 % (0.1 mg/gram) vaginal cream PLACE 1GRAM VAGINALLY EVERY EVENING FOR 14 DAYS, THEN 1GRAM TWICE A WEEK. 42.5 g 1    fluticasone propionate (FLONASE) 50 mcg/actuation nasal spray SPRAY 2 SPRAYS BY EACH NOSTRIL ROUTE ONCE DAILY. 16 mL 6    GLUTAMINE ORAL Take by mouth daily as needed (for dyspepsia).      levocetirizine (XYZAL) 5 MG tablet Take 1 tablet (5 mg total) by mouth once daily. 90 tablet 3    metroNIDAZOLE (FLAGYL) 500 MG tablet Take 1 tablet (500 mg total) by mouth 3 (three) times daily. 30 tablet 0    milk thistle 150 mg Cap Take by mouth 2 (two) times a day.      multivitamin (THERAGRAN) per tablet Take 1 tablet by mouth once daily.      mupirocin (BACTROBAN) 2 % ointment APPLY EXTERNALLY TO THE AFFECTED AREA THREE TIMES DAILY 22 g 5    niacin (SLO-NIACIN) 500 mg tablet Take 500 mg by mouth 2 (two) times daily with meals.      triamcinolone acetonide 0.1% (KENALOG) 0.1 % cream APPLY TOPICALLY TWICE A DAY 80 g 2    triamterene-hydrochlorothiazide 37.5-25 mg (MAXZIDE-25) 37.5-25 mg per tablet TAKE 1 TABLET BY MOUTH EVERY DAY 90 tablet 3    TUMERIC-GING-OLIVE-OREG-CAPRYL ORAL Take by mouth.       No current facility-administered medications on file prior to visit.       Medications have been reviewed and reconciled with patient at this visit.  Barriers to medications reviewed with patient.    Adverse reactions to current medications reviewed with patient..    Over the counter medications reviewed and reconciled with patient.    Exam:  Wt Readings from Last 3 Encounters:   10/18/23 82.5 kg (181 lb 14.1 oz)   10/16/23 81.6 kg (179 lb 14.3 oz)   04/11/23 81 kg (178 lb 9.2 oz)     Temp Readings from Last 3 Encounters:   10/16/23 97.9 °F (36.6 °C)   03/09/23 97.9 °F (36.6 °C)  (Temporal)   03/08/23 98.5 °F (36.9 °C) (Temporal)     BP Readings from Last 3 Encounters:   10/18/23 (!) 142/82   10/16/23 134/87   04/11/23 130/78     Pulse Readings from Last 3 Encounters:   10/18/23 96   10/16/23 88   03/09/23 85     There is no height or weight on file to calculate BMI.      Review of Systems   Cardiovascular:  Negative for chest pain and palpitations.   Gastrointestinal:  Negative for abdominal pain.     Physical Exam  Vitals and nursing note reviewed.   Constitutional:       General: She is not in acute distress.     Appearance: She is well-developed. She is not diaphoretic.   HENT:      Head: Normocephalic and atraumatic.      Right Ear: External ear normal.      Left Ear: External ear normal.   Eyes:      General:         Right eye: No discharge.         Left eye: No discharge.      Conjunctiva/sclera: Conjunctivae normal.      Pupils: Pupils are equal, round, and reactive to light.   Neck:      Thyroid: No thyromegaly.   Cardiovascular:      Rate and Rhythm: Normal rate and regular rhythm.      Heart sounds: Normal heart sounds. No murmur heard.  Pulmonary:      Effort: Pulmonary effort is normal. No respiratory distress.      Breath sounds: Normal breath sounds. No wheezing.   Abdominal:      General: Bowel sounds are normal.      Tenderness: There is no right CVA tenderness or left CVA tenderness.   Neurological:      Mental Status: She is alert and oriented to person, place, and time.   Psychiatric:         Behavior: Behavior normal.         Thought Content: Thought content normal.         Judgment: Judgment normal.         Laboratory Reviewed ({Yes)  Lab Results   Component Value Date    WBC 7.55 10/18/2023    HGB 15.6 10/18/2023    HCT 49.3 (H) 10/18/2023     10/18/2023    CHOL 271 (H) 10/18/2023    TRIG 371 (H) 10/18/2023    HDL 43 10/18/2023    ALT 32 10/18/2023    AST 25 10/18/2023     10/18/2023    K 4.2 10/18/2023     10/18/2023    CREATININE 0.9 10/18/2023     BUN 13 10/18/2023    CO2 24 10/18/2023    TSH 4.488 (H) 10/18/2023    HGBA1C 6.6 (H) 10/18/2023       Shahzad was seen today for follow-up.    Diagnoses and all orders for this visit:    Primary hypertension  -     Discontinue: amLODIPine (NORVASC) 5 MG tablet; Take 1 tablet (5 mg total) by mouth once daily.  -     losartan (COZAAR) 100 MG tablet; Take 1 tablet (100 mg total) by mouth once daily.    Mixed hyperlipidemia  -     ezetimibe (ZETIA) 10 mg tablet; Take 1 tablet (10 mg total) by mouth once daily.    Diverticulitis  -     Ambulatory referral/consult to Gastroenterology; Future      Pt did not want a statin  Will start on zetia. Cont working on diet and exercise    BP  Will change to losartan 100 mg daily    Follow up with Dr. Zuñiga for HTN check     Refer to GI for diverticulitis       Care Plan/Goals: Reviewed    Goals    None         Follow up: No follow-ups on file.    After visit summary was printed and given to patient upon discharge today.  Patient goals and care plan are included in After Visit Summary.

## 2023-11-29 ENCOUNTER — OFFICE VISIT (OUTPATIENT)
Dept: INTERNAL MEDICINE | Facility: CLINIC | Age: 63
End: 2023-11-29
Payer: COMMERCIAL

## 2023-11-29 VITALS
OXYGEN SATURATION: 96 % | DIASTOLIC BLOOD PRESSURE: 80 MMHG | HEART RATE: 80 BPM | HEIGHT: 71 IN | BODY MASS INDEX: 25.37 KG/M2 | TEMPERATURE: 98 F | WEIGHT: 181.19 LBS | SYSTOLIC BLOOD PRESSURE: 150 MMHG

## 2023-11-29 DIAGNOSIS — E78.2 MIXED HYPERLIPIDEMIA: ICD-10-CM

## 2023-11-29 DIAGNOSIS — K57.92 DIVERTICULITIS: ICD-10-CM

## 2023-11-29 DIAGNOSIS — I10 PRIMARY HYPERTENSION: Primary | ICD-10-CM

## 2023-11-29 PROCEDURE — 99999 PR PBB SHADOW E&M-EST. PATIENT-LVL V: CPT | Mod: PBBFAC,,,

## 2023-11-29 PROCEDURE — 99214 PR OFFICE/OUTPT VISIT, EST, LEVL IV, 30-39 MIN: ICD-10-PCS | Mod: S$GLB,,,

## 2023-11-29 PROCEDURE — 99999 PR PBB SHADOW E&M-EST. PATIENT-LVL V: ICD-10-PCS | Mod: PBBFAC,,,

## 2023-11-29 PROCEDURE — 99214 OFFICE O/P EST MOD 30 MIN: CPT | Mod: S$GLB,,,

## 2023-11-29 RX ORDER — AMLODIPINE BESYLATE 5 MG/1
5 TABLET ORAL DAILY
Qty: 90 TABLET | Refills: 3 | Status: SHIPPED | OUTPATIENT
Start: 2023-11-29 | End: 2023-11-29

## 2023-11-29 RX ORDER — EZETIMIBE 10 MG/1
10 TABLET ORAL DAILY
Qty: 90 TABLET | Refills: 3 | Status: SHIPPED | OUTPATIENT
Start: 2023-11-29 | End: 2024-11-28

## 2023-11-29 RX ORDER — LOSARTAN POTASSIUM 100 MG/1
100 TABLET ORAL DAILY
Qty: 90 TABLET | Refills: 3 | Status: SHIPPED | OUTPATIENT
Start: 2023-11-29 | End: 2024-11-28

## 2023-12-14 DIAGNOSIS — L30.9 DERMATITIS: ICD-10-CM

## 2023-12-14 DIAGNOSIS — J31.0 RHINITIS, UNSPECIFIED TYPE: ICD-10-CM

## 2023-12-14 DIAGNOSIS — L50.1 CHRONIC IDIOPATHIC URTICARIA: ICD-10-CM

## 2023-12-14 RX ORDER — LEVOCETIRIZINE DIHYDROCHLORIDE 5 MG/1
5 TABLET, FILM COATED ORAL
Qty: 30 TABLET | Refills: 11 | Status: SHIPPED | OUTPATIENT
Start: 2023-12-14

## 2024-01-22 PROBLEM — Z00.00 ANNUAL PHYSICAL EXAM: Status: RESOLVED | Noted: 2019-07-25 | Resolved: 2024-01-22

## 2024-02-06 ENCOUNTER — NURSE TRIAGE (OUTPATIENT)
Dept: ADMINISTRATIVE | Facility: CLINIC | Age: 64
End: 2024-02-06
Payer: COMMERCIAL

## 2024-02-06 ENCOUNTER — OFFICE VISIT (OUTPATIENT)
Dept: INTERNAL MEDICINE | Facility: CLINIC | Age: 64
End: 2024-02-06
Payer: COMMERCIAL

## 2024-02-06 ENCOUNTER — TELEPHONE (OUTPATIENT)
Dept: INTERNAL MEDICINE | Facility: CLINIC | Age: 64
End: 2024-02-06
Payer: COMMERCIAL

## 2024-02-06 VITALS
TEMPERATURE: 98 F | WEIGHT: 176.94 LBS | HEART RATE: 99 BPM | BODY MASS INDEX: 24.68 KG/M2 | OXYGEN SATURATION: 96 % | DIASTOLIC BLOOD PRESSURE: 88 MMHG | SYSTOLIC BLOOD PRESSURE: 152 MMHG

## 2024-02-06 DIAGNOSIS — B96.89 BACTERIAL SINUSITIS: Primary | ICD-10-CM

## 2024-02-06 DIAGNOSIS — R68.83 CHILLS: ICD-10-CM

## 2024-02-06 DIAGNOSIS — J30.9 ALLERGIC RHINITIS, UNSPECIFIED SEASONALITY, UNSPECIFIED TRIGGER: Chronic | ICD-10-CM

## 2024-02-06 DIAGNOSIS — J32.9 BACTERIAL SINUSITIS: Primary | ICD-10-CM

## 2024-02-06 LAB
CTP QC/QA: YES
POC MOLECULAR INFLUENZA A AGN: NEGATIVE
POC MOLECULAR INFLUENZA B AGN: NEGATIVE

## 2024-02-06 PROCEDURE — 87502 INFLUENZA DNA AMP PROBE: CPT | Mod: QW,S$GLB,, | Performed by: PHYSICIAN ASSISTANT

## 2024-02-06 PROCEDURE — 99213 OFFICE O/P EST LOW 20 MIN: CPT | Mod: S$GLB,,, | Performed by: PHYSICIAN ASSISTANT

## 2024-02-06 PROCEDURE — 99999 PR PBB SHADOW E&M-EST. PATIENT-LVL V: CPT | Mod: PBBFAC,,, | Performed by: PHYSICIAN ASSISTANT

## 2024-02-06 RX ORDER — LEVALBUTEROL INHALATION SOLUTION 1.25 MG/3ML
1 SOLUTION RESPIRATORY (INHALATION) EVERY 4 HOURS PRN
Qty: 75 ML | Refills: 0 | Status: SHIPPED | OUTPATIENT
Start: 2024-02-06 | End: 2024-02-15

## 2024-02-06 RX ORDER — PREDNISOLONE ACETATE 10 MG/ML
1 SUSPENSION/ DROPS OPHTHALMIC 3 TIMES DAILY
COMMUNITY
Start: 2024-02-03

## 2024-02-06 RX ORDER — PREDNISONE 10 MG/1
10 TABLET ORAL 2 TIMES DAILY
Qty: 10 TABLET | Refills: 0 | Status: SHIPPED | OUTPATIENT
Start: 2024-02-06 | End: 2024-02-11

## 2024-02-06 RX ORDER — CETIRIZINE HYDROCHLORIDE 10 MG/1
10 TABLET ORAL DAILY
Qty: 30 TABLET | Refills: 0 | Status: SHIPPED | OUTPATIENT
Start: 2024-02-06 | End: 2024-02-28 | Stop reason: SDUPTHER

## 2024-02-06 RX ORDER — AZITHROMYCIN 250 MG/1
TABLET, FILM COATED ORAL
Qty: 6 TABLET | Refills: 0 | Status: SHIPPED | OUTPATIENT
Start: 2024-02-06 | End: 2024-02-20

## 2024-02-06 NOTE — PROGRESS NOTES
Subjective:       Patient ID: Shahzad Feliz is a 63 y.o. female.    Chief Complaint: Nasal Congestion (Patient stated that she felt like she was drowning in her own mucus last night. )      HPI  64 y/o female presents to  clinic with c/o 2 day history of nasal congestion and postnasal drainage. Reports has had sinus pain and pressure for the last 2-3 week along with eye redness and itching. She sees allergist and has chronic urticaria. Reports had nasal congestion severe and unable to breath. She takes Xyzal daily and Astelin daily. No fever but reports chills. No cough.     Review of Systems   Constitutional:  Positive for chills. Negative for fever.   HENT:  Positive for congestion, postnasal drip, sinus pressure and sinus pain. Negative for ear pain.    Respiratory:  Negative for cough.    Cardiovascular:  Negative for chest pain.       Objective:      Physical Exam  Vitals and nursing note reviewed.   Constitutional:       General: She is not in acute distress.     Appearance: She is well-developed.   HENT:      Head: Normocephalic and atraumatic.      Right Ear: Tympanic membrane, ear canal and external ear normal.      Left Ear: Tympanic membrane, ear canal and external ear normal.      Nose: No mucosal edema.      Comments: Erythematous nasal mucosa noted     Mouth/Throat:      Lips: Pink.      Mouth: Mucous membranes are moist.      Pharynx: No posterior oropharyngeal erythema.      Comments: Clear PND noted to posterior pharynx  Eyes:      General: Lids are normal. No scleral icterus.     Extraocular Movements: Extraocular movements intact.      Conjunctiva/sclera: Conjunctivae normal.   Cardiovascular:      Rate and Rhythm: Normal rate and regular rhythm.   Pulmonary:      Effort: Pulmonary effort is normal.      Breath sounds: Normal breath sounds. No decreased breath sounds, wheezing, rhonchi or rales.   Neurological:      Mental Status: She is alert.      Cranial Nerves: No cranial nerve deficit.    Psychiatric:         Mood and Affect: Mood and affect normal.         Assessment:       1. Bacterial sinusitis    2. Chills    3. Allergic rhinitis, unspecified seasonality, unspecified trigger        Plan:   1. Bacterial sinusitis  -     cetirizine (ZYRTEC) 10 MG tablet; Take 1 tablet (10 mg total) by mouth once daily.  Dispense: 30 tablet; Refill: 0  -     levalbuterol (XOPENEX) 1.25 mg/3 mL nebulizer solution; Take 3 mLs (1.25 mg total) by nebulization every 4 (four) hours as needed for Wheezing. Rescue  Dispense: 75 mL; Refill: 0  -     azithromycin (Z-NESSA) 250 MG tablet; Take 2 tablets by mouth on day 1; Take 1 tablet by mouth on days 2-5  Dispense: 6 tablet; Refill: 0    2. Chills  -     POCT Influenza A/B Molecular    3. Allergic rhinitis, unspecified seasonality, unspecified trigger  -     predniSONE (DELTASONE) 10 MG tablet; Take 1 tablet (10 mg total) by mouth 2 (two) times daily. for 5 days  Dispense: 10 tablet; Refill: 0      Flu neg  Pt requesting steroids, advised of postmarketing risk of a.fib. She insisted on steroid medication prescription.

## 2024-02-06 NOTE — TELEPHONE ENCOUNTER
----- Message from Lizabeth Waters sent at 2/6/2024  8:12 AM CST -----  Name of Who is Calling:GOPAL WHEELER [[id number]]        What is the request in detail:Pt would like a callback from the office, pt states she feels like she's drowning in mucus and unable to breath.Attempted to connect Pt with nurse on call. Please advise thank you       Can the clinic reply by [facility]:NO        [facility] Back [facility]Telephone Information:  Mobile          [phone]

## 2024-02-06 NOTE — TELEPHONE ENCOUNTER
[place]    PCP:  Dr. Satni Zuñiga    C/O difficulty breathing through her nose, nasal congestion, productive cough with brown mucus, severe HA, strain behind her eyes, mild SOB with activity, intermittent dizziness upon standing, diarrhea, chills, fatigue, loss of taste/smell, generalized heat rash, PND, and eye issues (chronic dry eye and current eye infection).  She states feels like she's been drowning since 4a.  She states taking nasal decongestant, humidifier, Mucinex DM, daily allergy med, and eye drops but not effective.  Denies fever, SOB at rest, and CP.  She tested for Covid on Saturday or Sunday and it was - at that time.  Per protocol, care advised is call  now.  Pt VU and refused care advised.  Care advice reinforced but she wants to speak with PCP.  She refuses to call 911 or go to the ED.  Advised to call for worsening/questions/concerns.  VU.    Reason for Disposition   SEVERE difficulty breathing (e.g., struggling for each breath, speaks in single words)    Protocols used: Coronavirus (COVID-19) Diagnosed or [id number]

## 2024-02-06 NOTE — TELEPHONE ENCOUNTER
"Called pt to follow up on call through nurse triage. Pt c/o of severe nasal congestion, head ache, and eye irritation. States that she will not go to the er for symptoms because "there are too many germs in the hospital". Pt states that she tested negative for covid previously .Advised pt that she can go to urgent care, but pt refused. Scheduled pt an appointment today with on of our providers. Pt accepted appointment.  "

## 2024-02-09 ENCOUNTER — TELEPHONE (OUTPATIENT)
Dept: INTERNAL MEDICINE | Facility: CLINIC | Age: 64
End: 2024-02-09
Payer: COMMERCIAL

## 2024-02-09 NOTE — TELEPHONE ENCOUNTER
Spoke with pt, she states she saw Ching 02/06 and her congestion has improved but now she is having chest pain radiating to back. I notified her our next available appt would be Wednesday and pt declined stating she needed to be seen today. I notified her Ching and Dr. Zuñiga are not in the office today and no one else at [facility] has any available appts. I notified her she should try urgent care or emergency room if her chest is hurting and her BP is elevated, she states she called the urgent care and they said they cannot help her and to go to ER. She said she cannot go to the ER because she would have to pay her deductible and I notified her she can ask to be billed. She declined and states she will find a new doctor and I apologized that we do not have any appointments but that was the options she had.

## 2024-02-09 NOTE — TELEPHONE ENCOUNTER
----- Message from Margo Mclean sent at 2/9/2024  2:12 PM CST -----  Contact: Shahzad Arellano is calling in regards to her still feeling bad and pressure is elevated .please call back at .[phone]           Thanks  ITZ

## 2024-02-15 DIAGNOSIS — B96.89 BACTERIAL SINUSITIS: ICD-10-CM

## 2024-02-15 DIAGNOSIS — J32.9 BACTERIAL SINUSITIS: ICD-10-CM

## 2024-02-15 RX ORDER — LEVALBUTEROL INHALATION SOLUTION 1.25 MG/3ML
1 SOLUTION RESPIRATORY (INHALATION) EVERY 4 HOURS PRN
Qty: 450 ML | Refills: 1 | Status: SHIPPED | OUTPATIENT
Start: 2024-02-15 | End: 2024-03-14

## 2024-02-20 ENCOUNTER — OFFICE VISIT (OUTPATIENT)
Dept: INTERNAL MEDICINE | Facility: CLINIC | Age: 64
End: 2024-02-20
Payer: COMMERCIAL

## 2024-02-20 VITALS
HEIGHT: 71 IN | HEART RATE: 90 BPM | WEIGHT: 178.13 LBS | SYSTOLIC BLOOD PRESSURE: 142 MMHG | TEMPERATURE: 98 F | OXYGEN SATURATION: 97 % | DIASTOLIC BLOOD PRESSURE: 96 MMHG | BODY MASS INDEX: 24.94 KG/M2

## 2024-02-20 DIAGNOSIS — I10 PRIMARY HYPERTENSION: ICD-10-CM

## 2024-02-20 DIAGNOSIS — R03.0 ELEVATED BLOOD PRESSURE READING: ICD-10-CM

## 2024-02-20 DIAGNOSIS — R10.13 EPIGASTRIC PAIN: ICD-10-CM

## 2024-02-20 DIAGNOSIS — J30.9 CHRONIC ALLERGIC RHINITIS: Primary | ICD-10-CM

## 2024-02-20 DIAGNOSIS — R05.1 ACUTE COUGH: ICD-10-CM

## 2024-02-20 PROCEDURE — 99999 PR PBB SHADOW E&M-EST. PATIENT-LVL V: CPT | Mod: PBBFAC,,, | Performed by: FAMILY MEDICINE

## 2024-02-20 PROCEDURE — 99214 OFFICE O/P EST MOD 30 MIN: CPT | Mod: S$GLB,,, | Performed by: FAMILY MEDICINE

## 2024-02-20 RX ORDER — PANTOPRAZOLE SODIUM 40 MG/1
40 TABLET, DELAYED RELEASE ORAL DAILY
Qty: 30 TABLET | Refills: 0 | Status: SHIPPED | OUTPATIENT
Start: 2024-02-20 | End: 2024-03-08

## 2024-02-20 NOTE — PROGRESS NOTES
Subjective:       Patient ID: Shahzad Feliz is a 63 y.o. female.    Chief Complaint: Abdominal Pain    She continues with some eye irritation nasal congestion postnasal drip coughing occasional wheezing.  She has a history chronic allergic rhinitis.  She was recently treated for acute sinusitis.  More recently she developed epigastric discomfort burning.  She denies nausea vomiting dysphagia.  She reports she an episode of bright red blood rectally.  Colonoscopy is up-to-date and reviewed.  She also has a history of peptic ulcer disease many years ago.  She is currently on Zyrtec daily Astelin nasal spray 1 spray daily.  She also has Xopenex that she has not been using.  She is concerned her blood pressures been elevated at home recently she continues on medication    Abdominal Pain  Pertinent negatives include no fever, headaches, nausea or vomiting.     Review of Systems   Constitutional:  Negative for chills and fever.   HENT:  Positive for congestion and postnasal drip.    Respiratory:  Positive for cough and wheezing. Negative for chest tightness and shortness of breath.    Cardiovascular:  Negative for chest pain, palpitations and leg swelling.   Gastrointestinal:  Positive for abdominal pain and anal bleeding. Negative for nausea and vomiting.   Neurological:  Negative for dizziness, weakness, light-headedness and headaches.   Psychiatric/Behavioral:  The patient is nervous/anxious.         Reports work stress       Objective:      Physical Exam  Constitutional:       General: She is not in acute distress.     Appearance: She is not ill-appearing or diaphoretic.   HENT:      Right Ear: Tympanic membrane normal.      Left Ear: Tympanic membrane normal.      Nose: Congestion present.      Comments: Boggy nasal mucosa     Mouth/Throat:      Pharynx: No oropharyngeal exudate or posterior oropharyngeal erythema.   Eyes:      Conjunctiva/sclera: Conjunctivae normal.   Cardiovascular:      Rate and Rhythm:  Normal rate and regular rhythm.      Heart sounds: No murmur heard.     No gallop.   Pulmonary:      Effort: Pulmonary effort is normal. No respiratory distress.      Breath sounds: No wheezing, rhonchi or rales.   Abdominal:      General: There is no distension.      Palpations: There is no mass.      Tenderness: There is abdominal tenderness.      Comments: Mild epigastric tenderness   Lymphadenopathy:      Cervical: No cervical adenopathy.   Neurological:      Mental Status: She is alert.   Psychiatric:         Mood and Affect: Mood normal.         Behavior: Behavior normal.         Office Visit on 02/06/2024   Component Date Value Ref Range Status    POC Molecular Influenza A Ag 02/06/2024 Negative  Negative, Not Reported Final    POC Molecular Influenza B Ag 02/06/2024 Negative  Negative, Not Reported Final     Acceptable 02/06/2024 Yes   Final     Assessment:       1. Chronic allergic rhinitis    2. Primary hypertension    3. Epigastric pain    4. Elevated blood pressure reading    5. Acute cough        Plan:   Blood pressure noted monitor at home daily.  Follow-up one-month.  Continue Zyrtec daily.  Increase Astelin and start 2 sprays each nostril twice a day.  Resume Xopenex twice a day.  Start Protonix 40 mg a day.  Soft bland diet was discussed      Chronic allergic rhinitis    Primary hypertension    Epigastric pain    Elevated blood pressure reading    Acute cough    Other orders  -     pantoprazole (PROTONIX) 40 MG tablet; Take 1 tablet (40 mg total) by mouth once daily.  Dispense: 30 tablet; Refill: 0

## 2024-02-28 DIAGNOSIS — J32.9 BACTERIAL SINUSITIS: ICD-10-CM

## 2024-02-28 DIAGNOSIS — B96.89 BACTERIAL SINUSITIS: ICD-10-CM

## 2024-02-28 NOTE — TELEPHONE ENCOUNTER
No care due was identified.  [facility] Embedded Care Due Messages. Reference number: [id number].   2/28/2024 2:32:22 PM CST

## 2024-02-28 NOTE — TELEPHONE ENCOUNTER
Refill Routing Note   Medication(s) are not appropriate for processing by [facility] for the following reason(s):        New or recently adjusted medication  No active prescription written by provider    [facility] action(s):  Defer        Medication Therapy Plan: New start (2/6/24)      Appointments  past 12m or future 3m with PCP    Date Provider   Last Visit   2/20/2024 Santi Zuñiga MD   Next Visit   3/26/2024 Santi Zuñiga MD   ED visits in past 90 days: 0        Note composed:3:51 PM 02/28/2024

## 2024-02-29 RX ORDER — CETIRIZINE HYDROCHLORIDE 10 MG/1
10 TABLET ORAL DAILY
Qty: 90 TABLET | Refills: 3 | Status: SHIPPED | OUTPATIENT
Start: 2024-02-29 | End: 2024-03-26

## 2024-03-08 RX ORDER — PANTOPRAZOLE SODIUM 40 MG/1
40 TABLET, DELAYED RELEASE ORAL
Qty: 90 TABLET | Refills: 3 | Status: SHIPPED | OUTPATIENT
Start: 2024-03-08 | End: 2024-03-26 | Stop reason: SDUPTHER

## 2024-03-08 NOTE — TELEPHONE ENCOUNTER
Refill Routing Note   Medication(s) are not appropriate for processing by [facility] for the following reason(s):        New or recently adjusted medication    [facility] action(s):  Defer               Appointments  past 12m or future 3m with PCP    Date Provider   Last Visit   2/20/2024 Santi Zuñiga MD   Next Visit   3/26/2024 Santi Zuñiga MD   ED visits in past 90 days: 0        Note composed:8:03 AM 03/08/2024

## 2024-03-08 NOTE — TELEPHONE ENCOUNTER
No care due was identified.  [facility] Embedded Care Due Messages. Reference number: [id number].   3/08/2024 7:45:34 AM CST

## 2024-03-13 DIAGNOSIS — B96.89 BACTERIAL SINUSITIS: ICD-10-CM

## 2024-03-13 DIAGNOSIS — J32.9 BACTERIAL SINUSITIS: ICD-10-CM

## 2024-03-13 NOTE — TELEPHONE ENCOUNTER
No care due was identified.  [facility] Embedded Care Due Messages. Reference number: [id number].   3/13/2024 2:21:39 PM CDT

## 2024-03-13 NOTE — TELEPHONE ENCOUNTER
Refill Routing Note   Medication(s) are not appropriate for processing by [facility] for the following reason(s):        New or recently adjusted medication  No active prescription written by provider    [facility] action(s):  Defer               Appointments  past 12m or future 3m with PCP    Date Provider   Last Visit   2/20/2024 Santi Zuñiga MD   Next Visit   3/26/2024 Santi Zuñiga MD   ED visits in past 90 days: 0        Note composed:2:21 PM 03/13/2024

## 2024-03-14 RX ORDER — LEVALBUTEROL INHALATION SOLUTION 1.25 MG/3ML
1 SOLUTION RESPIRATORY (INHALATION) EVERY 4 HOURS PRN
Qty: 1350 ML | Refills: 3 | Status: SHIPPED | OUTPATIENT
Start: 2024-03-14

## 2024-03-26 ENCOUNTER — OFFICE VISIT (OUTPATIENT)
Dept: INTERNAL MEDICINE | Facility: CLINIC | Age: 64
End: 2024-03-26
Payer: COMMERCIAL

## 2024-03-26 VITALS
SYSTOLIC BLOOD PRESSURE: 142 MMHG | HEART RATE: 83 BPM | TEMPERATURE: 98 F | OXYGEN SATURATION: 96 % | DIASTOLIC BLOOD PRESSURE: 80 MMHG | BODY MASS INDEX: 24.85 KG/M2 | HEIGHT: 71 IN | WEIGHT: 177.5 LBS

## 2024-03-26 DIAGNOSIS — J30.9 CHRONIC ALLERGIC RHINITIS: Primary | ICD-10-CM

## 2024-03-26 DIAGNOSIS — H00.026 MEIBOMIANITIS OF LEFT EYE: ICD-10-CM

## 2024-03-26 DIAGNOSIS — R10.13 EPIGASTRIC PAIN: ICD-10-CM

## 2024-03-26 DIAGNOSIS — R03.0 ELEVATED BLOOD PRESSURE READING: ICD-10-CM

## 2024-03-26 PROCEDURE — 99999 PR PBB SHADOW E&M-EST. PATIENT-LVL V: CPT | Mod: PBBFAC,,, | Performed by: FAMILY MEDICINE

## 2024-03-26 PROCEDURE — 99214 OFFICE O/P EST MOD 30 MIN: CPT | Mod: S$GLB,,, | Performed by: FAMILY MEDICINE

## 2024-03-26 RX ORDER — GENTAMICIN SULFATE 0.3 %
0.5 OINTMENT (GRAM) OPHTHALMIC (EYE) 2 TIMES DAILY
Qty: 3.5 G | Refills: 1 | Status: SHIPPED | OUTPATIENT
Start: 2024-03-26

## 2024-03-26 RX ORDER — PANTOPRAZOLE SODIUM 40 MG/1
40 TABLET, DELAYED RELEASE ORAL DAILY
Qty: 90 TABLET | Refills: 1 | Status: SHIPPED | OUTPATIENT
Start: 2024-03-26

## 2024-03-26 NOTE — PROGRESS NOTES
Subjective:       Patient ID: Shahzad Feliz is a 63 y.o. female.    Chief Complaint: Follow-up    Follow-up allergic rhinitis epigastric discomfort hypertension swallowing left upper eyelid.  She reports allergies have improved.  She still does have some nasal congestion.  She is using Astelin nose spray and Xyzal.  Epigastric discomfort resolves taking Protonix daily.  She denies chest pain palpitations shortness of breath or edema.  She has had one-month duration swelling of the upper eyelid area.  This was preceded by itchy eyes related to her allergies.    Follow-up  Associated symptoms include congestion and headaches. Pertinent negatives include no abdominal pain, chest pain, chills, coughing, fever, nausea or weakness.     Review of Systems   Constitutional:  Negative for chills and fever.   HENT:  Positive for congestion and sinus pressure.    Eyes:  Positive for itching.   Respiratory:  Negative for cough, chest tightness, shortness of breath and wheezing.    Cardiovascular:  Negative for chest pain, palpitations and leg swelling.   Gastrointestinal:  Negative for abdominal distention, abdominal pain and nausea.   Neurological:  Positive for headaches. Negative for dizziness, weakness and light-headedness.        Frontal related to her allergies       Objective:      Physical Exam  Constitutional:       General: She is not in acute distress.     Appearance: She is not ill-appearing or diaphoretic.   HENT:      Right Ear: Tympanic membrane normal.      Left Ear: Tympanic membrane normal.      Nose: Congestion present.   Eyes:      Comments: Localized swelling redness in the left upper eyelid   Cardiovascular:      Rate and Rhythm: Normal rate and regular rhythm.      Heart sounds: No murmur heard.     No gallop.   Pulmonary:      Effort: Pulmonary effort is normal. No respiratory distress.      Breath sounds: No wheezing, rhonchi or rales.   Abdominal:      General: There is no distension.       Palpations: There is no mass.      Tenderness: There is no abdominal tenderness.   Skin:     General: Skin is warm and dry.   Neurological:      Mental Status: She is alert.         Office Visit on 02/06/2024   Component Date Value Ref Range Status    POC Molecular Influenza A Ag 02/06/2024 Negative  Negative, Not Reported Final    POC Molecular Influenza B Ag 02/06/2024 Negative  Negative, Not Reported Final     Acceptable 02/06/2024 Yes   Final     Assessment:       1. Chronic allergic rhinitis    2. Meibomianitis of left eye    3. Elevated blood pressure reading    4. Epigastric pain        Plan:   Blood pressure 142/80.  Diet exercise discussed.  Continue to monitor.  Warm compresses and gentamicin ophthalmic ointment left upper eyelid area.  Continue Protonix daily for total of 3 months.  Follow-up in 3 months.  She also had a recent episode of generalized hives after eating shellfish.  This resolved.  Medications      Chronic allergic rhinitis    Meibomianitis of left eye  -     gentamicin (GARAMYCIN) 0.3 % (3 mg/gram) ophthalmic ointment; Place 0.5 inches into the left eye 2 (two) times daily.  Dispense: 3.5 g; Refill: 1    Elevated blood pressure reading    Epigastric pain    Other orders  -     pantoprazole (PROTONIX) 40 MG tablet; Take 1 tablet (40 mg total) by mouth once daily.  Dispense: 90 tablet; Refill: 1

## 2024-06-26 ENCOUNTER — HOSPITAL ENCOUNTER (OUTPATIENT)
Dept: RADIOLOGY | Facility: HOSPITAL | Age: 64
Discharge: HOME | End: 2024-06-26
Attending: FAMILY MEDICINE
Payer: COMMERCIAL

## 2024-06-26 ENCOUNTER — OFFICE VISIT (OUTPATIENT)
Dept: INTERNAL MEDICINE | Facility: CLINIC | Age: 64
End: 2024-06-26
Payer: COMMERCIAL

## 2024-06-26 VITALS
HEIGHT: 71 IN | OXYGEN SATURATION: 97 % | DIASTOLIC BLOOD PRESSURE: 84 MMHG | HEART RATE: 72 BPM | SYSTOLIC BLOOD PRESSURE: 138 MMHG | BODY MASS INDEX: 25.4 KG/M2 | TEMPERATURE: 98 F | WEIGHT: 181.44 LBS

## 2024-06-26 DIAGNOSIS — Z12.39 BREAST SCREENING: ICD-10-CM

## 2024-06-26 DIAGNOSIS — R10.13 EPIGASTRIC PAIN: Primary | ICD-10-CM

## 2024-06-26 DIAGNOSIS — M25.561 ACUTE PAIN OF RIGHT KNEE: ICD-10-CM

## 2024-06-26 DIAGNOSIS — I10 PRIMARY HYPERTENSION: ICD-10-CM

## 2024-06-26 PROCEDURE — 99999 PR PBB SHADOW E&M-EST. PATIENT-LVL V: CPT | Mod: PBBFAC,,, | Performed by: FAMILY MEDICINE

## 2024-06-26 PROCEDURE — 99214 OFFICE O/P EST MOD 30 MIN: CPT | Mod: S$GLB,,, | Performed by: FAMILY MEDICINE

## 2024-06-26 PROCEDURE — 73560 X-RAY EXAM OF KNEE 1 OR 2: CPT | Mod: TC,LT

## 2024-06-26 PROCEDURE — 73560 X-RAY EXAM OF KNEE 1 OR 2: CPT | Mod: 26,59,LT, | Performed by: RADIOLOGY

## 2024-06-26 PROCEDURE — 73562 X-RAY EXAM OF KNEE 3: CPT | Mod: 26,RT,, | Performed by: RADIOLOGY

## 2024-06-26 NOTE — PROGRESS NOTES
Subjective:       Patient ID: Shahzad Feliz is a 63 y.o. female.    Chief Complaint: Follow-up and Rash    She continues with upper GI symptoms.  She relates the onset about 6 months ago after taking antibiotic.  She denies nausea vomiting diarrhea.  She denies fever chills.  She reports previous reflux has improved with Protonix.  She is currently having epigastric discomfort immediately after eating.  She feels like it has a gas buildup which improved with belching.  She is due for mammogram which is being scheduled.  She has had several weeks duration of right knee buckling and giving way.  She reports some morning stiffness.  She denies recent trauma    Follow-up  Associated symptoms include abdominal pain, arthralgias and a rash. Pertinent negatives include no chest pain, chills, coughing, fever or nausea.   Rash  Pertinent negatives include no cough, diarrhea, fever or shortness of breath.     Review of Systems   Constitutional:  Negative for chills and fever.   Respiratory:  Negative for cough, chest tightness, shortness of breath and wheezing.    Cardiovascular:  Negative for chest pain, palpitations and leg swelling.   Gastrointestinal:  Positive for abdominal pain. Negative for abdominal distention, diarrhea and nausea.   Musculoskeletal:  Positive for arthralgias.        Right knee discomfort   Skin:  Positive for rash.       Objective:      Physical Exam  Constitutional:       General: She is not in acute distress.     Appearance: She is not ill-appearing or diaphoretic.   Cardiovascular:      Rate and Rhythm: Normal rate and regular rhythm.      Heart sounds: No murmur heard.     No gallop.   Pulmonary:      Effort: Pulmonary effort is normal. No respiratory distress.      Breath sounds: No wheezing, rhonchi or rales.   Abdominal:      General: There is no distension.      Palpations: Abdomen is soft. There is no mass.      Comments: Epigastric tenderness   Musculoskeletal:         General: No  swelling, tenderness or deformity.      Comments: Full extension of the right knee.  She is mild crepitance of the left knee anteriorly   Lymphadenopathy:      Cervical: No cervical adenopathy.   Neurological:      Mental Status: She is alert.         Office Visit on 02/06/2024   Component Date Value Ref Range Status    POC Molecular Influenza A Ag 02/06/2024 Negative  Negative, Not Reported Final    POC Molecular Influenza B Ag 02/06/2024 Negative  Negative, Not Reported Final     Acceptable 02/06/2024 Yes   Final     Assessment:       1. Epigastric pain    2. Breast screening    3. Acute pain of right knee    4. Primary hypertension        Plan:   Mammogram ordered.  EGD ordered.  X-ray right knee.  Consider orthopedic referral.  Follow-up in 3 months.  Blood pressure controlled      Epigastric pain  -     Case Request Endoscopy: EGD (ESOPHAGOGASTRODUODENOSCOPY)    Breast screening  -     Mammo Digital Screening Bilat w/ Zana; Future; Expected date: 06/26/2024    Acute pain of right knee  -     X-Ray Knee 3 View Right; Future; Expected date: 06/26/2024    Primary hypertension

## 2024-07-03 ENCOUNTER — TELEPHONE (OUTPATIENT)
Dept: INTERNAL MEDICINE | Facility: CLINIC | Age: 64
End: 2024-07-03
Payer: COMMERCIAL

## 2024-07-03 NOTE — TELEPHONE ENCOUNTER
----- Message from [address] sent at 7/3/2024  8:18 AM CDT -----  Contact: self  ..Type:  Needs Medical Advice    Who Called: .Shahzad Feliz  Symptoms (please be specific): burning, itching, swelling of the hemorrhoids  How long has patient had these symptoms:  last Monday she had an slight itch   Pharmacy name and phone #:  .  [id number] - [place] - [address]  [address] [place] [address]  Phone: [phone] Fax: [phone]  Would the patient rather a call back or a response via [facility]? Call back   Best Call Back Number: .[phone] (home)   Additional Information: pt states she is needing proctozone hc called in for her, also states new allergy medication called in for her is not covered by her insurance

## 2024-07-03 NOTE — TELEPHONE ENCOUNTER
Patient states she is having itching burning and swelling of the hemorrhoids. She is requesting medication be sent to local pharmacy.  LOV 06/26/2024 Please advise.

## 2024-07-05 RX ORDER — HYDROCORTISONE ACETATE PRAMOXINE HCL 1; 1 G/100G; G/100G
CREAM TOPICAL 3 TIMES DAILY
Qty: 30 G | Refills: 1 | Status: SHIPPED | OUTPATIENT
Start: 2024-07-05

## 2024-07-09 ENCOUNTER — OFFICE VISIT (OUTPATIENT)
Dept: INTERNAL MEDICINE | Facility: CLINIC | Age: 64
End: 2024-07-09
Payer: COMMERCIAL

## 2024-07-09 VITALS
WEIGHT: 181.44 LBS | TEMPERATURE: 98 F | BODY MASS INDEX: 25.4 KG/M2 | DIASTOLIC BLOOD PRESSURE: 92 MMHG | OXYGEN SATURATION: 96 % | HEIGHT: 71 IN | SYSTOLIC BLOOD PRESSURE: 148 MMHG | HEART RATE: 82 BPM

## 2024-07-09 DIAGNOSIS — J31.0 RHINITIS, UNSPECIFIED TYPE: ICD-10-CM
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
## 2024-07-09 DIAGNOSIS — L30.9 DERMATITIS: ICD-10-CM

## 2024-07-09 DIAGNOSIS — M54.16 LUMBAR RADICULOPATHY: ICD-10-CM

## 2024-07-09 DIAGNOSIS — M54.12 CERVICAL RADICULOPATHY: ICD-10-CM

## 2024-07-09 DIAGNOSIS — L50.1 CHRONIC IDIOPATHIC URTICARIA: ICD-10-CM
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
## 2024-07-09 PROCEDURE — 99999 PR PBB SHADOW E&M-EST. PATIENT-LVL V: CPT | Mod: PBBFAC,,, | Performed by: FAMILY MEDICINE

## 2024-07-09 PROCEDURE — 99214 OFFICE O/P EST MOD 30 MIN: CPT | Mod: S$GLB,,, | Performed by: FAMILY MEDICINE

## 2024-07-09 RX ORDER — LEVOCETIRIZINE DIHYDROCHLORIDE 5 MG/1
5 TABLET, FILM COATED ORAL NIGHTLY
Qty: 30 TABLET | Refills: 11 | Status: SHIPPED | OUTPATIENT
Start: 2024-07-09

## 2024-07-09 RX ORDER — CYCLOBENZAPRINE HCL 5 MG
5 TABLET ORAL NIGHTLY
Qty: 30 TABLET | Refills: 0 | Status: SHIPPED | OUTPATIENT
Start: 2024-07-09
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
## 2024-07-16 ENCOUNTER — HOSPITAL ENCOUNTER (OUTPATIENT)
Dept: RADIOLOGY | Facility: HOSPITAL | Age: 64
Discharge: HOME | End: 2024-07-16
Attending: FAMILY MEDICINE
Payer: COMMERCIAL

## 2024-07-16 DIAGNOSIS — M54.12 CERVICAL RADICULOPATHY: ICD-10-CM

## 2024-07-16 DIAGNOSIS — M54.16 LUMBAR RADICULOPATHY: ICD-10-CM

## 2024-07-16 PROCEDURE — 72141 MRI NECK SPINE W/O DYE: CPT | Mod: 26,,, | Performed by: RADIOLOGY

## 2024-07-16 PROCEDURE — 72141 MRI NECK SPINE W/O DYE: CPT | Mod: TC,PN

## 2024-07-16 PROCEDURE — 72148 MRI LUMBAR SPINE W/O DYE: CPT | Mod: 26,,, | Performed by: RADIOLOGY

## 2024-07-16 PROCEDURE — 72148 MRI LUMBAR SPINE W/O DYE: CPT | Mod: TC,PN

## 2024-07-19 ENCOUNTER — OFFICE VISIT (OUTPATIENT)
Dept: INTERNAL MEDICINE | Facility: CLINIC | Age: 64
End: 2024-07-19
Payer: COMMERCIAL

## 2024-07-19 ENCOUNTER — LAB VISIT (OUTPATIENT)
Dept: LAB | Facility: HOSPITAL | Age: 64
End: 2024-07-19
Payer: COMMERCIAL

## 2024-07-19 ENCOUNTER — TELEPHONE (OUTPATIENT)
Dept: PAIN MEDICINE | Facility: CLINIC | Age: 64
End: 2024-07-19
Payer: COMMERCIAL

## 2024-07-19 VITALS
OXYGEN SATURATION: 95 % | BODY MASS INDEX: 25.4 KG/M2 | HEIGHT: 71 IN | SYSTOLIC BLOOD PRESSURE: 158 MMHG | DIASTOLIC BLOOD PRESSURE: 80 MMHG | HEART RATE: 76 BPM | WEIGHT: 181.44 LBS | TEMPERATURE: 97 F

## 2024-07-19 DIAGNOSIS — M54.9 DORSALGIA: ICD-10-CM

## 2024-07-19 DIAGNOSIS — Z78.0 POSTMENOPAUSAL: ICD-10-CM

## 2024-07-19 DIAGNOSIS — R73.09 ELEVATED HEMOGLOBIN A1C: ICD-10-CM

## 2024-07-19 DIAGNOSIS — R73.09 ELEVATED HEMOGLOBIN A1C: Primary | ICD-10-CM

## 2024-07-19 DIAGNOSIS — J31.0 RHINITIS, UNSPECIFIED TYPE: ICD-10-CM

## 2024-07-19 DIAGNOSIS — I10 PRIMARY HYPERTENSION: ICD-10-CM

## 2024-07-19 DIAGNOSIS — M54.12 CERVICAL RADICULOPATHY: ICD-10-CM

## 2024-07-19 DIAGNOSIS — M54.16 LUMBAR RADICULOPATHY: ICD-10-CM
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
## 2024-07-19 PROBLEM — R07.2 PRECORDIAL CHEST PAIN: Status: ACTIVE | Noted: 2021-06-21

## 2024-07-19 LAB
ESTIMATED AVG GLUCOSE: 128 MG/DL (ref 68–131)
HBA1C MFR BLD: 6.1 % (ref 4–5.6)

## 2024-07-19 PROCEDURE — 99999 PR PBB SHADOW E&M-EST. PATIENT-LVL V: CPT | Mod: PBBFAC,,,

## 2024-07-19 PROCEDURE — 83036 HEMOGLOBIN GLYCOSYLATED A1C: CPT

## 2024-07-19 PROCEDURE — 36415 COLL VENOUS BLD VENIPUNCTURE: CPT

## 2024-07-19 PROCEDURE — 99215 OFFICE O/P EST HI 40 MIN: CPT | Mod: S$GLB,,,

## 2024-07-19 RX ORDER — VALSARTAN 320 MG/1
320 TABLET ORAL DAILY
Qty: 90 TABLET | Refills: 3 | Status: SHIPPED | OUTPATIENT
Start: 2024-07-19 | End: 2025-07-19

## 2024-07-19 NOTE — TELEPHONE ENCOUNTER
Contacted patient regarding referral with Back [facility] Spine. Scheduled appointment. Patient unhappy with wait time and voiced her concern with [facility] wait times in general. I offered first available appointments at all locations and explained our wait list. Patient reluctantly scheduled. I advised her to contact both her PCP for direction as well as her insurance for an external facility, as she stated she may need to find somewhere else to be seen. Also, I provided my contact information if she needed my help with anything. Patient v/u and thanked me for helping her.  MARILU
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
## 2024-07-23 ENCOUNTER — CLINICAL SUPPORT (OUTPATIENT)
Dept: REHABILITATION | Facility: HOSPITAL | Age: 64
End: 2024-07-23
Payer: COMMERCIAL

## 2024-07-23 DIAGNOSIS — M54.12 CERVICAL RADICULOPATHY: ICD-10-CM

## 2024-07-23 DIAGNOSIS — R29.3 POOR POSTURE: ICD-10-CM

## 2024-07-23 DIAGNOSIS — M54.16 LUMBAR RADICULOPATHY: ICD-10-CM

## 2024-07-23 DIAGNOSIS — R29.898 DECREASED RANGE OF MOTION OF NECK: ICD-10-CM

## 2024-07-23 DIAGNOSIS — M53.86 DECREASED RANGE OF MOTION OF LUMBAR SPINE: ICD-10-CM

## 2024-07-23 DIAGNOSIS — M54.9 DORSALGIA: ICD-10-CM

## 2024-07-23 DIAGNOSIS — Z74.09 DECREASED FUNCTIONAL MOBILITY AND ENDURANCE: Primary | ICD-10-CM

## 2024-07-23 DIAGNOSIS — M62.81 PROXIMAL MUSCLE WEAKNESS: ICD-10-CM

## 2024-07-23 PROCEDURE — 97162 PT EVAL MOD COMPLEX 30 MIN: CPT

## 2024-07-23 PROCEDURE — 97535 SELF CARE MNGMENT TRAINING: CPT
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
## 2024-08-07 ENCOUNTER — CLINICAL SUPPORT (OUTPATIENT)
Dept: REHABILITATION | Facility: HOSPITAL | Age: 64
End: 2024-08-07
Payer: COMMERCIAL

## 2024-08-07 DIAGNOSIS — M53.86 DECREASED RANGE OF MOTION OF LUMBAR SPINE: ICD-10-CM

## 2024-08-07 DIAGNOSIS — R29.3 POOR POSTURE: ICD-10-CM

## 2024-08-07 DIAGNOSIS — M62.81 PROXIMAL MUSCLE WEAKNESS: ICD-10-CM

## 2024-08-07 DIAGNOSIS — R29.898 DECREASED RANGE OF MOTION OF NECK: ICD-10-CM

## 2024-08-07 DIAGNOSIS — Z74.09 DECREASED FUNCTIONAL MOBILITY AND ENDURANCE: Primary | ICD-10-CM

## 2024-08-07 PROCEDURE — 97140 MANUAL THERAPY 1/> REGIONS: CPT | Performed by: PHYSICAL THERAPIST

## 2024-08-07 PROCEDURE — 97530 THERAPEUTIC ACTIVITIES: CPT | Performed by: PHYSICAL THERAPIST

## 2024-08-07 PROCEDURE — 97110 THERAPEUTIC EXERCISES: CPT | Performed by: PHYSICAL THERAPIST

## 2024-08-07 PROCEDURE — 97112 NEUROMUSCULAR REEDUCATION: CPT | Performed by: PHYSICAL THERAPIST

## 2024-08-16 ENCOUNTER — HOSPITAL ENCOUNTER (OUTPATIENT)
Dept: RADIOLOGY | Facility: HOSPITAL | Age: 64
Discharge: HOME | End: 2024-08-16
Attending: FAMILY MEDICINE
Payer: COMMERCIAL

## 2024-08-16 DIAGNOSIS — Z12.39 BREAST SCREENING: ICD-10-CM

## 2024-08-16 PROCEDURE — 77067 SCR MAMMO BI INCL CAD: CPT | Mod: TC

## 2024-08-16 PROCEDURE — 77063 BREAST TOMOSYNTHESIS BI: CPT | Mod: 26,,, | Performed by: RADIOLOGY

## 2024-08-16 PROCEDURE — 77063 BREAST TOMOSYNTHESIS BI: CPT | Mod: TC

## 2024-08-16 PROCEDURE — 77067 SCR MAMMO BI INCL CAD: CPT | Mod: 26,,, | Performed by: RADIOLOGY

## 2024-08-20 DIAGNOSIS — E78.2 MIXED HYPERLIPIDEMIA: ICD-10-CM

## 2024-08-20 RX ORDER — EZETIMIBE 10 MG/1
10 TABLET ORAL
Qty: 90 TABLET | Refills: 3 | Status: SHIPPED | OUTPATIENT
Start: 2024-08-20

## 2024-08-20 NOTE — TELEPHONE ENCOUNTER
Refill Routing Note   Medication(s) are not appropriate for processing by [facility] for the following reason(s):        Non-participating provider  Responsible provider unclear    [facility] action(s):  Route             Appointments  past 12m or future 3m with PCP    Date Provider   Last Visit   7/19/2024 Arabella Mcintyre NP   Next Visit   Visit date not found Arabella Mcintyre NP   ED visits in past 90 days: 0        Note composed:2:42 PM 08/20/2024

## 2024-08-21 ENCOUNTER — OFFICE VISIT (OUTPATIENT)
Dept: OBSTETRICS AND GYNECOLOGY | Facility: CLINIC | Age: 64
End: 2024-08-21
Payer: COMMERCIAL

## 2024-08-21 ENCOUNTER — CLINICAL SUPPORT (OUTPATIENT)
Dept: REHABILITATION | Facility: HOSPITAL | Age: 64
End: 2024-08-21
Payer: COMMERCIAL

## 2024-08-21 VITALS
SYSTOLIC BLOOD PRESSURE: 120 MMHG | BODY MASS INDEX: 24.72 KG/M2 | HEIGHT: 71 IN | WEIGHT: 176.56 LBS | DIASTOLIC BLOOD PRESSURE: 76 MMHG

## 2024-08-21 DIAGNOSIS — N95.2 VAGINAL ATROPHY: ICD-10-CM

## 2024-08-21 DIAGNOSIS — Z01.419 WELL WOMAN EXAM WITH ROUTINE GYNECOLOGICAL EXAM: Primary | ICD-10-CM

## 2024-08-21 DIAGNOSIS — K64.4 EXTERNAL HEMORRHOIDS: ICD-10-CM

## 2024-08-21 DIAGNOSIS — R29.3 POOR POSTURE: ICD-10-CM

## 2024-08-21 DIAGNOSIS — M62.81 PROXIMAL MUSCLE WEAKNESS: ICD-10-CM

## 2024-08-21 DIAGNOSIS — M53.86 DECREASED RANGE OF MOTION OF LUMBAR SPINE: ICD-10-CM

## 2024-08-21 DIAGNOSIS — Z78.0 POSTMENOPAUSAL: ICD-10-CM

## 2024-08-21 DIAGNOSIS — R29.898 DECREASED RANGE OF MOTION OF NECK: ICD-10-CM

## 2024-08-21 DIAGNOSIS — Z74.09 DECREASED FUNCTIONAL MOBILITY AND ENDURANCE: Primary | ICD-10-CM

## 2024-08-21 PROCEDURE — 97110 THERAPEUTIC EXERCISES: CPT

## 2024-08-21 PROCEDURE — 99999 PR PBB SHADOW E&M-EST. PATIENT-LVL V: CPT | Mod: PBBFAC,,, | Performed by: OBSTETRICS & GYNECOLOGY

## 2024-08-21 PROCEDURE — 99396 PREV VISIT EST AGE 40-64: CPT | Mod: S$GLB,,, | Performed by: OBSTETRICS & GYNECOLOGY

## 2024-08-21 PROCEDURE — 97530 THERAPEUTIC ACTIVITIES: CPT

## 2024-08-21 PROCEDURE — 97140 MANUAL THERAPY 1/> REGIONS: CPT

## 2024-08-21 PROCEDURE — 97112 NEUROMUSCULAR REEDUCATION: CPT

## 2024-08-21 RX ORDER — ESTRADIOL 0.1 MG/G
1 CREAM VAGINAL
Qty: 42.5 G | Refills: 3 | Status: SHIPPED | OUTPATIENT
Start: 2024-08-22

## 2024-08-21 NOTE — PROGRESS NOTES
Subjective     Patient ID: Shahzad Feliz is a 63 y.o. female.    Chief Complaint:  Well Woman      History of Present Illness  HPI  Presents for well-woman exam. Pt had hysterectomy for benign indications, and had 2 other separate surgeries for BL oophorectomy.  All pathology benign.  Pt was prescribed vaginal estrace cream last visit.  Not consistently using it, but would like refill.  Requests refill of Proctofoam for itchy hemorrhoids.  Proctocream makes the itching worse, but the foam works much better for her.  No hx of cvx dysplasia  MM2024: wnl  DXA: 2024: wnl    GYN & OB History  No LMP recorded. Patient has had a hysterectomy.   Date of Last Pap: No result found    OB History    Para Term  AB Living   5 4 4   1 4   SAB IAB Ectopic Multiple Live Births   1       4      # Outcome Date GA Lbr Demetrius/2nd Weight Sex Type Anes PTL Lv   5 SAB               Complications: Miscarriage   4 Term      Vag-Spont   ALBA   3 Term      Vag-Spont   ALBA   2 Term      Vag-Spont   ALBA   1 Term      Vag-Spont   ALBA       Review of Systems  Review of Systems       Objective   Physical Exam:   Constitutional: She is oriented to person, place, and time. She appears well-developed and well-nourished. No distress.      Neck: No thyromegaly present.     Pulmonary/Chest: Right breast exhibits no inverted nipple, no mass, no nipple discharge, no skin change, no tenderness, no bleeding and no swelling. Left breast exhibits no inverted nipple, no mass, no nipple discharge, no skin change, no tenderness, no bleeding and no swelling. Breasts are symmetrical.        Abdominal: Soft. She exhibits no distension and no mass. There is no abdominal tenderness. There is no rebound and no guarding. Hernia confirmed negative in the right inguinal area and confirmed negative in the left inguinal area.     Genitourinary:    Vagina normal.            Pelvic exam was performed with patient supine.   There is no rash, tenderness,  lesion or injury on the right labia. There is no rash, tenderness, lesion or injury on the left labia. There is an absent right adnexa and an absent left adnexa. No erythema, vaginal discharge, tenderness, bleeding, rectocele, cystocele or prolapse of vaginal walls in the vagina.    No foreign body in the vagina.      No signs of injury in the vagina.   Vaginal atrophy noted. Cervix is absent.Uterus is absent.               Neurological: She is alert and oriented to person, place, and time.     Psychiatric: She has a normal mood and affect.            Assessment and Plan     1. Well woman exam with routine gynecological exam    2. Postmenopausal    3. Vaginal atrophy    4. External hemorrhoids             Plan:  Shahzad was seen today for well woman.    Diagnoses and all orders for this visit:    Well woman exam with routine gynecological exam    Postmenopausal  -     Ambulatory referral/consult to Obstetrics / Gynecology    Vaginal atrophy  -     estradioL (ESTRACE) 0.01 % (0.1 mg/gram) vaginal cream; Place 1 g vaginally twice a week.    External hemorrhoids  -     hydrocortisone-pramoxine (PROCTOFOAM-HS) rectal foam; Place 1 applicator rectally 2 (two) times daily.     Reviewed updated recommendations for pap smears (no pap smear needed) in patients with a hysterectomy for benign indications.   Patient needs a pelvic exam every 3-5 years.  Reviewed recommendations for annual CBE and annual MMG.  RTC 1 year

## 2024-08-23 NOTE — PROGRESS NOTES
"[facility]   Physical Therapy Treatment Note        Name: Shahzad Redd [facility] Number: [id number]    Therapy Diagnosis:   Encounter Diagnoses   Name Primary?    Decreased functional mobility and endurance Yes    Decreased range of motion of lumbar spine     Decreased range of motion of neck     Proximal muscle weakness     Poor posture      Physician: Arabella Mcintyre, NP    Visit Date: 8/21/2024    Physician Orders: PT Eval and Treat  Medical Diagnosis from Referral: Lumbar radiculopathy [M54.16], Dorsalgia [M54.9], Cervical radiculopathy [M54.12]   Evaluation Date: 7/23/2024  Authorization Period Expiration: 12/31/2024   Plan of Care Expiration: 10/23/2024   Progress Note Due: 8/23/2024  Visit # / Visits authorized: 2/20 (+eval)  FOTO: 1/3 (last performed on 7/23/2024)     Precautions: Standard    PTA Visit #: 0/5     Time In: 1500  Time Out: 1602  Total Billable Time: 50 minutes (Billing reflects 1 on 1 treatment time spent with patient)    Subjective     Patient reports: She has not been able to make it to therapy in the last few weeks due to her and her  being sick. Patient is feeling much better now. Notes no significant changes in hand in foot numbness and tingling however her neck has been very sore from laying around so much    He/She was compliant with home exercise program.  Response to previous treatment: no adverse reaction  Functional change: none noted yet    Pain: 5/10     Location: neck, R upper extremity and lower extremity     Objective      Objective Measures updated at progress report or POC update only unless otherwise noted.       Treatment     Shahzad received the treatments listed below:     CPT Intervention Performed   Today Duration / Intensity   MT Soft Tissue Mobilization x Bilateral upper trapezius, levator scapula, cervical paraspinals, and suboccipitals     Joint Mobilization  x Manual cervical distraction   TE Pec corner stretch  x 10 x 10" " "holds     Piriformis stretch x 3 x 30" each LE             NMR UBE for postural strength and endurance x 3'/3'     Chin tucks - supine  x 30x      Bilateral ER - supine x Yellow band 3 x 10     Side lying clams x 2 x 10 each LE     Cervical isometrics - ball on wall  x 20x retractions and side bending    TA Scapular Retractions x 3 x 10, red theraband, cues to prevent shoulder shrug     Shoulder Extensions x 3 x 10, red theraband     bridges x 2 x 10                                                     PLAN Test neural mobility upper extremity   Abdominal bracing   Sciatic nerve glides              CPT Codes available for Billing:   (10) minutes of Manual therapy (MT) to improve pain and ROM.  (10) minutes of Therapeutic Exercise (TE) to develop strength, endurance, range of motion, and flexibility.  (24) minutes of Neuromuscular Re-Education (NMR)  to improve: Balance, Coordination, Kinesthetic, Sense, Proprioception, and Posture.  (12) minutes of Therapeutic Activities (TA) to improve functional performance.  Vasopneumatic Device Therapy () for management of swelling/edema. (31382)  Unattended Electrical Stimulation (ES) for muscle performance or pain modulation.  BFR: Blood flow restriction applied during exercise      Patient Education and Home Exercises       Home Exercises Provided and Patient Education Provided     Education provided: (included in treatment section) minutes  PURPOSE: Patient educated on the impairments noted above and the effects of physical therapy intervention to improve overall condition and QOL.   EXERCISE: Patient was educated on all the above exercise prior/during/after for proper posture, positioning, and execution for safe performance with home exercise program.   STRENGTH: Patient educated on the importance of improved core and extremity strength in order to improve alignment of the spine and extremities with static positions and dynamic movement.   GAIT & BALANCE: Patient educated " on the importance of strong core and lower extremity musculature in order to improve both static and dynamic balance, improve gait mechanics, reduce fall risk and improve household and community mobility.   POSTURE: Patient educated on postural awareness to reduce stress and maintain optimal alignment of the spine with static positions and dynamic movement     Written Home Exercises Provided: yes.  Exercises were reviewed and Shahzad was able to demonstrate them prior to the end of the session.  Shahzad demonstrated good  understanding of the education provided. See EMR under Patient Instructions for exercises provided during therapy sessions.    Assessment     Patient tolerated session well today. Incorporated chin docs and cervical isometrics on wall to improve cervical stability. Progress resistance with scapular attractions and shoulder extensions; However, patient continues to require queuing to reduce shoulder hiking.    Shahzad is progressing well towards her goals.   Patient prognosis is Good.     Patient will continue to benefit from skilled outpatient physical therapy to address the deficits listed in the problem list box on initial evaluation, provide pt/family education and to maximize patient's level of independence in the home and community environment.     Patient's spiritual, cultural and educational needs considered and pt agreeable to plan of care and goals.     Anticipated Barriers for therapy: co-morbidities, sedentary lifestyle, chronicity of condition, lack of understanding of condition, and adherence to treatment plan     Goals:    Short Term Goals:  6 weeks Status  Date Met   PAIN: Pt will report worst pain of 6/10 in order to progress toward max functional ability and improve quality of life. [x] Progressing  [] Met  [] Not Met     FUNCTION: Patient will demonstrate improved function as indicated by a score of greater than or equal to 54 out of 100 on FOTO. [x] Progressing  [] Met  [] Not Met      MOBILITY: Patient will improve AROM to 50% of stated goals, listed in objective measures above, in order to progress towards independence with functional activities.  [x] Progressing  [] Met  [] Not Met     STRENGTH: Patient will improve strength to 50% of stated goals, listed in objective measures above, in order to progress towards independence with functional activities. [x] Progressing  [] Met  [] Not Met     POSTURE: Patient will correct postural deviations in sitting and standing, to decrease pain and promote long term stability.  [x] Progressing  [] Met  [] Not Met     HEP: Patient will demonstrate independence with HEP in order to progress toward functional independence. [x] Progressing  [] Met  [] Not Met        Long Term Goals:  12 weeks Status Date Met   PAIN: Pt will report worst pain of 3/10 in order to progress toward max functional ability and improve quality of life [x] Progressing  [] Met  [] Not Met     FUNCTION: Patient will demonstrate improved function as indicated by a score of greater than or equal to 60 out of 100 on FOTO. [x] Progressing  [] Met  [] Not Met     MOBILITY: Patient will improve AROM to stated goals, listed in objective measures above, in order to return to maximal functional potential and improve quality of life.  [x] Progressing  [] Met  [] Not Met     STRENGTH: Patient will improve strength to stated goals, listed in objective measures above, in order to improve functional independence and quality of life.  [x] Progressing  [] Met  [] Not Met     GAIT: Patient will demonstrate normalized gait mechanics with minimal compensation in order to return to PLOF. [x] Progressing  [] Met  [] Not Met     Patient will return to normal ADL's, IADL's, community involvement, recreational activities, and work-related activities with less than or equal to 3/10 pain and maximal function.  [x] Progressing  [] Met  [] Not Met        Plan     Continue Plan of Care (POC) and progress per  patient tolerance. See treatment section for details on planned progressions next session.      Monika Romero, PT

## 2024-08-26 ENCOUNTER — CLINICAL SUPPORT (OUTPATIENT)
Dept: REHABILITATION | Facility: HOSPITAL | Age: 64
End: 2024-08-26
Payer: COMMERCIAL

## 2024-08-26 DIAGNOSIS — R29.3 POOR POSTURE: ICD-10-CM

## 2024-08-26 DIAGNOSIS — M53.86 DECREASED RANGE OF MOTION OF LUMBAR SPINE: ICD-10-CM

## 2024-08-26 DIAGNOSIS — Z74.09 DECREASED FUNCTIONAL MOBILITY AND ENDURANCE: Primary | ICD-10-CM

## 2024-08-26 DIAGNOSIS — R29.898 DECREASED RANGE OF MOTION OF NECK: ICD-10-CM

## 2024-08-26 DIAGNOSIS — M62.81 PROXIMAL MUSCLE WEAKNESS: ICD-10-CM

## 2024-08-26 PROCEDURE — 97530 THERAPEUTIC ACTIVITIES: CPT

## 2024-08-26 PROCEDURE — 97110 THERAPEUTIC EXERCISES: CPT

## 2024-08-26 PROCEDURE — 97112 NEUROMUSCULAR REEDUCATION: CPT

## 2024-08-26 PROCEDURE — 97140 MANUAL THERAPY 1/> REGIONS: CPT

## 2024-08-27 NOTE — PROGRESS NOTES
"[facility]   Physical Therapy Treatment Note        Name: Shahzad Redd [facility] Number: [id number]    Therapy Diagnosis:   Encounter Diagnoses   Name Primary?    Decreased functional mobility and endurance Yes    Decreased range of motion of lumbar spine     Decreased range of motion of neck     Proximal muscle weakness     Poor posture      Physician: Arabella Mcintyre, NP    Visit Date: 8/26/2024    Physician Orders: PT Eval and Treat  Medical Diagnosis from Referral: Lumbar radiculopathy [M54.16], Dorsalgia [M54.9], Cervical radiculopathy [M54.12]   Evaluation Date: 7/23/2024  Authorization Period Expiration: 12/31/2024   Plan of Care Expiration: 10/23/2024   Progress Note Due: 8/23/2024  Visit # / Visits authorized: 2/20 (+eval)  FOTO: 1/3 (last performed on 7/23/2024)     Precautions: Standard    PTA Visit #: 0/5     Time In: 1502  Time Out: 1608  Total Billable Time: 54 minutes (Billing reflects 1 on 1 treatment time spent with patient)    Subjective     Patient reports: She is having more pain today after helping her daughter to move over the weekend     He/She was compliant with home exercise program.  Response to previous treatment: no adverse reaction  Functional change: none noted yet    Pain: 5/10     Location: neck, R upper extremity and lower extremity     Objective      Objective Measures updated at progress report or POC update only unless otherwise noted.       Treatment     Shahzad received the treatments listed below:     CPT Intervention Performed   Today Duration / Intensity   MT Soft Tissue Mobilization x Bilateral upper trapezius, levator scapula, cervical paraspinals, and suboccipitals     Joint Mobilization  x Manual cervical distraction   TE UBE  x Level 1, 3 mins forward and backward      Pec corner stretch  x 10 x 10" holds     Piriformis stretch x 3 x 30" each LE             NMR UBE for postural strength and endurance x 3'/3'     Chin tucks - supine  x 30x  "     Bilateral ER - seated  x Red band 3 x 10     Side lying clams x 2 x 10 each LE     Cervical isometrics - ball on wall  x 20x retractions and side bending      Median nerve glides  x 10x 5s holds b    TA Scapular Retractions x 5# 3 x 10, cues to prevent shoulder shrug     Shoulder Extensions x 5# 3 x 10     bridges x 3 x 10                                                     PLAN Test neural mobility upper extremity   Abdominal bracing   Posterior pelvic tilt   Sciatic nerve glides   Scalene release with rotation              CPT Codes available for Billing:   (10) minutes of Manual therapy (MT) to improve pain and ROM.  (10) minutes of Therapeutic Exercise (TE) to develop strength, endurance, range of motion, and flexibility.  (22) minutes of Neuromuscular Re-Education (NMR)  to improve: Balance, Coordination, Kinesthetic, Sense, Proprioception, and Posture.  (12) minutes of Therapeutic Activities (TA) to improve functional performance.  Vasopneumatic Device Therapy () for management of swelling/edema. (70157)  Unattended Electrical Stimulation (ES) for muscle performance or pain modulation.  BFR: Blood flow restriction applied during exercise      Patient Education and Home Exercises       Home Exercises Provided and Patient Education Provided     Education provided: (included in treatment section) minutes  PURPOSE: Patient educated on the impairments noted above and the effects of physical therapy intervention to improve overall condition and QOL.   EXERCISE: Patient was educated on all the above exercise prior/during/after for proper posture, positioning, and execution for safe performance with home exercise program.   STRENGTH: Patient educated on the importance of improved core and extremity strength in order to improve alignment of the spine and extremities with static positions and dynamic movement.   GAIT & BALANCE: Patient educated on the importance of strong core and lower extremity musculature in  order to improve both static and dynamic balance, improve gait mechanics, reduce fall risk and improve household and community mobility.   POSTURE: Patient educated on postural awareness to reduce stress and maintain optimal alignment of the spine with static positions and dynamic movement     Written Home Exercises Provided: yes.  Exercises were reviewed and Shahzad was able to demonstrate them prior to the end of the session.  Shahzad demonstrated good  understanding of the education provided. See EMR under Patient Instructions for exercises provided during therapy sessions.    Assessment     Pt tolerated session well today. Added hold time with cervical isometrics to improve cervical endurance and stability. Added median nerve glides to improve neural mobility; pt instructed to perform with no or minimal symptoms increase.     Shahzad is progressing well towards her goals.   Patient prognosis is Good.     Patient will continue to benefit from skilled outpatient physical therapy to address the deficits listed in the problem list box on initial evaluation, provide pt/family education and to maximize patient's level of independence in the home and community environment.     Patient's spiritual, cultural and educational needs considered and pt agreeable to plan of care and goals.     Anticipated Barriers for therapy: co-morbidities, sedentary lifestyle, chronicity of condition, lack of understanding of condition, and adherence to treatment plan     Goals:    Short Term Goals:  6 weeks Status  Date Met   PAIN: Pt will report worst pain of 6/10 in order to progress toward max functional ability and improve quality of life. [x] Progressing  [] Met  [] Not Met     FUNCTION: Patient will demonstrate improved function as indicated by a score of greater than or equal to 54 out of 100 on FOTO. [x] Progressing  [] Met  [] Not Met     MOBILITY: Patient will improve AROM to 50% of stated goals, listed in objective measures above, in  order to progress towards independence with functional activities.  [x] Progressing  [] Met  [] Not Met     STRENGTH: Patient will improve strength to 50% of stated goals, listed in objective measures above, in order to progress towards independence with functional activities. [x] Progressing  [] Met  [] Not Met     POSTURE: Patient will correct postural deviations in sitting and standing, to decrease pain and promote long term stability.  [x] Progressing  [] Met  [] Not Met     HEP: Patient will demonstrate independence with HEP in order to progress toward functional independence. [x] Progressing  [] Met  [] Not Met        Long Term Goals:  12 weeks Status Date Met   PAIN: Pt will report worst pain of 3/10 in order to progress toward max functional ability and improve quality of life [x] Progressing  [] Met  [] Not Met     FUNCTION: Patient will demonstrate improved function as indicated by a score of greater than or equal to 60 out of 100 on FOTO. [x] Progressing  [] Met  [] Not Met     MOBILITY: Patient will improve AROM to stated goals, listed in objective measures above, in order to return to maximal functional potential and improve quality of life.  [x] Progressing  [] Met  [] Not Met     STRENGTH: Patient will improve strength to stated goals, listed in objective measures above, in order to improve functional independence and quality of life.  [x] Progressing  [] Met  [] Not Met     GAIT: Patient will demonstrate normalized gait mechanics with minimal compensation in order to return to PLOF. [x] Progressing  [] Met  [] Not Met     Patient will return to normal ADL's, IADL's, community involvement, recreational activities, and work-related activities with less than or equal to 3/10 pain and maximal function.  [x] Progressing  [] Met  [] Not Met        Plan     Continue Plan of Care (POC) and progress per patient tolerance. See treatment section for details on planned progressions next session.      Monika  Nick, PT

## 2024-08-30 ENCOUNTER — CLINICAL SUPPORT (OUTPATIENT)
Dept: REHABILITATION | Facility: HOSPITAL | Age: 64
End: 2024-08-30
Payer: COMMERCIAL

## 2024-08-30 DIAGNOSIS — M53.86 DECREASED RANGE OF MOTION OF LUMBAR SPINE: ICD-10-CM

## 2024-08-30 DIAGNOSIS — R29.898 DECREASED RANGE OF MOTION OF NECK: ICD-10-CM

## 2024-08-30 DIAGNOSIS — R29.3 POOR POSTURE: ICD-10-CM

## 2024-08-30 DIAGNOSIS — M62.81 PROXIMAL MUSCLE WEAKNESS: ICD-10-CM

## 2024-08-30 DIAGNOSIS — Z74.09 DECREASED FUNCTIONAL MOBILITY AND ENDURANCE: Primary | ICD-10-CM

## 2024-08-30 PROCEDURE — 97110 THERAPEUTIC EXERCISES: CPT

## 2024-08-30 PROCEDURE — 97112 NEUROMUSCULAR REEDUCATION: CPT

## 2024-08-30 PROCEDURE — 97530 THERAPEUTIC ACTIVITIES: CPT

## 2024-08-30 PROCEDURE — 97140 MANUAL THERAPY 1/> REGIONS: CPT

## 2024-08-30 NOTE — PROGRESS NOTES
"[facility]   Physical Therapy Treatment Note        Name: Shahzad Redd [facility] Number: [id number]    Therapy Diagnosis:   Encounter Diagnoses   Name Primary?    Decreased functional mobility and endurance Yes    Decreased range of motion of lumbar spine     Decreased range of motion of neck     Proximal muscle weakness     Poor posture      Physician: Arabella Mcintyre, NP    Visit Date: 8/30/2024    Physician Orders: PT Eval and Treat  Medical Diagnosis from Referral: Lumbar radiculopathy [M54.16], Dorsalgia [M54.9], Cervical radiculopathy [M54.12]   Evaluation Date: 7/23/2024  Authorization Period Expiration: 12/31/2024   Plan of Care Expiration: 10/23/2024   Progress Note Due: 8/23/2024  Visit # / Visits authorized: 2/20 (+eval)  FOTO: 1/3 (last performed on 7/23/2024)     Precautions: Standard    PTA Visit #: 0/5     Time In: 1330  Time Out: 1401  Total Billable Time: 52 minutes (Billing reflects 1 on 1 treatment time spent with patient)    Subjective     Patient reports: She has been sore and stiff which she attributes to helping her daughter move recently. Notes more tension through the upper thoracic spine     He/She was compliant with home exercise program.  Response to previous treatment: no adverse reaction  Functional change: none noted yet    Pain: 5/10     Location: neck, R upper extremity and lower extremity     Objective      Objective Measures updated at progress report or POC update only unless otherwise noted.       Treatment     Shahzad received the treatments listed below:     CPT Intervention Performed   Today Duration / Intensity   MT Soft Tissue Mobilization x Bilateral upper trapezius, levator scapula, cervical paraspinals, and suboccipitals     Joint Mobilization  x Manual cervical distraction   TE UBE  x Level 1, 3 mins forward and backward      Pec corner stretch  x 10 x 10" holds     Piriformis stretch   3 x 30" each LE     Series 6  x 1 min each      Open " books  x 15x b    NMR UBE for postural strength and endurance x 3'/3'     Chin tucks - supine  x 30x      Bilateral ER x Red band 3 x 10     Side lying clams   2 x 10 each LE     Cervical isometrics - ball on wall  x 20x retractions and side bending      Median nerve glides    10x 5s holds b    TA Scapular Retractions x 5# 3 x 10, cues to prevent shoulder shrug     Shoulder Extensions x 5# 3 x 10     Punchouts  x #5 3x10 b      bridges   3 x 10                                                     PLAN Test neural mobility upper extremity   Abdominal bracing   Posterior pelvic tilt   Sciatic nerve glides   Scalene release with rotation              CPT Codes available for Billing:   (10) minutes of Manual therapy (MT) to improve pain and ROM.  (15) minutes of Therapeutic Exercise (TE) to develop strength, endurance, range of motion, and flexibility.  (15) minutes of Neuromuscular Re-Education (NMR)  to improve: Balance, Coordination, Kinesthetic, Sense, Proprioception, and Posture.  (12) minutes of Therapeutic Activities (TA) to improve functional performance.  Vasopneumatic Device Therapy () for management of swelling/edema. (29758)  Unattended Electrical Stimulation (ES) for muscle performance or pain modulation.  BFR: Blood flow restriction applied during exercise      Patient Education and Home Exercises       Home Exercises Provided and Patient Education Provided     Education provided: (included in treatment section) minutes  PURPOSE: Patient educated on the impairments noted above and the effects of physical therapy intervention to improve overall condition and QOL.   EXERCISE: Patient was educated on all the above exercise prior/during/after for proper posture, positioning, and execution for safe performance with home exercise program.   STRENGTH: Patient educated on the importance of improved core and extremity strength in order to improve alignment of the spine and extremities with static positions and  dynamic movement.   GAIT & BALANCE: Patient educated on the importance of strong core and lower extremity musculature in order to improve both static and dynamic balance, improve gait mechanics, reduce fall risk and improve household and community mobility.   POSTURE: Patient educated on postural awareness to reduce stress and maintain optimal alignment of the spine with static positions and dynamic movement     Written Home Exercises Provided: yes.  Exercises were reviewed and Shahzad was able to demonstrate them prior to the end of the session.  Shahzad demonstrated good  understanding of the education provided. See EMR under Patient Instructions for exercises provided during therapy sessions.    Assessment     Pt tolerated session well today. Incorporated series 6 and open books to improve thoracic mobility and posture. Pt is sore following session but feels good overall following manual interventions.     Shahzad is progressing well towards her goals.   Patient prognosis is Good.     Patient will continue to benefit from skilled outpatient physical therapy to address the deficits listed in the problem list box on initial evaluation, provide pt/family education and to maximize patient's level of independence in the home and community environment.     Patient's spiritual, cultural and educational needs considered and pt agreeable to plan of care and goals.     Anticipated Barriers for therapy: co-morbidities, sedentary lifestyle, chronicity of condition, lack of understanding of condition, and adherence to treatment plan     Goals:    Short Term Goals:  6 weeks Status  Date Met   PAIN: Pt will report worst pain of 6/10 in order to progress toward max functional ability and improve quality of life. [x] Progressing  [] Met  [] Not Met     FUNCTION: Patient will demonstrate improved function as indicated by a score of greater than or equal to 54 out of 100 on FOTO. [x] Progressing  [] Met  [] Not Met     MOBILITY: Patient  will improve AROM to 50% of stated goals, listed in objective measures above, in order to progress towards independence with functional activities.  [x] Progressing  [] Met  [] Not Met     STRENGTH: Patient will improve strength to 50% of stated goals, listed in objective measures above, in order to progress towards independence with functional activities. [x] Progressing  [] Met  [] Not Met     POSTURE: Patient will correct postural deviations in sitting and standing, to decrease pain and promote long term stability.  [x] Progressing  [] Met  [] Not Met     HEP: Patient will demonstrate independence with HEP in order to progress toward functional independence. [x] Progressing  [] Met  [] Not Met        Long Term Goals:  12 weeks Status Date Met   PAIN: Pt will report worst pain of 3/10 in order to progress toward max functional ability and improve quality of life [x] Progressing  [] Met  [] Not Met     FUNCTION: Patient will demonstrate improved function as indicated by a score of greater than or equal to 60 out of 100 on FOTO. [x] Progressing  [] Met  [] Not Met     MOBILITY: Patient will improve AROM to stated goals, listed in objective measures above, in order to return to maximal functional potential and improve quality of life.  [x] Progressing  [] Met  [] Not Met     STRENGTH: Patient will improve strength to stated goals, listed in objective measures above, in order to improve functional independence and quality of life.  [x] Progressing  [] Met  [] Not Met     GAIT: Patient will demonstrate normalized gait mechanics with minimal compensation in order to return to PLOF. [x] Progressing  [] Met  [] Not Met     Patient will return to normal ADL's, IADL's, community involvement, recreational activities, and work-related activities with less than or equal to 3/10 pain and maximal function.  [x] Progressing  [] Met  [] Not Met        Plan     Continue Plan of Care (POC) and progress per patient tolerance. See  treatment section for details on planned progressions next session.      Monika Romero, PT

## 2024-09-04 ENCOUNTER — CLINICAL SUPPORT (OUTPATIENT)
Dept: REHABILITATION | Facility: HOSPITAL | Age: 64
End: 2024-09-04
Payer: COMMERCIAL

## 2024-09-04 DIAGNOSIS — M53.86 DECREASED RANGE OF MOTION OF LUMBAR SPINE: ICD-10-CM

## 2024-09-04 DIAGNOSIS — M62.81 PROXIMAL MUSCLE WEAKNESS: ICD-10-CM

## 2024-09-04 DIAGNOSIS — R29.3 POOR POSTURE: ICD-10-CM

## 2024-09-04 DIAGNOSIS — Z74.09 DECREASED FUNCTIONAL MOBILITY AND ENDURANCE: Primary | ICD-10-CM

## 2024-09-04 DIAGNOSIS — R29.898 DECREASED RANGE OF MOTION OF NECK: ICD-10-CM

## 2024-09-04 PROCEDURE — 97110 THERAPEUTIC EXERCISES: CPT

## 2024-09-04 PROCEDURE — 97530 THERAPEUTIC ACTIVITIES: CPT

## 2024-09-04 PROCEDURE — 97112 NEUROMUSCULAR REEDUCATION: CPT

## 2024-09-06 ENCOUNTER — CLINICAL SUPPORT (OUTPATIENT)
Dept: REHABILITATION | Facility: HOSPITAL | Age: 64
End: 2024-09-06
Payer: COMMERCIAL

## 2024-09-06 DIAGNOSIS — M62.81 PROXIMAL MUSCLE WEAKNESS: ICD-10-CM

## 2024-09-06 DIAGNOSIS — M53.86 DECREASED RANGE OF MOTION OF LUMBAR SPINE: ICD-10-CM

## 2024-09-06 DIAGNOSIS — Z74.09 DECREASED FUNCTIONAL MOBILITY AND ENDURANCE: Primary | ICD-10-CM

## 2024-09-06 DIAGNOSIS — R29.3 POOR POSTURE: ICD-10-CM

## 2024-09-06 DIAGNOSIS — R29.898 DECREASED RANGE OF MOTION OF NECK: ICD-10-CM

## 2024-09-06 PROCEDURE — 97112 NEUROMUSCULAR REEDUCATION: CPT

## 2024-09-06 PROCEDURE — 97530 THERAPEUTIC ACTIVITIES: CPT

## 2024-09-06 NOTE — PROGRESS NOTES
"[facility]   Physical Therapy Treatment Note        Name: Shahzad Redd [facility] Number: [id number]    Therapy Diagnosis:   Encounter Diagnoses   Name Primary?    Decreased functional mobility and endurance Yes    Decreased range of motion of lumbar spine     Decreased range of motion of neck     Proximal muscle weakness     Poor posture      Physician: Arabella Mcintyre, NP    Visit Date: 9/4/2024    Physician Orders: PT Eval and Treat  Medical Diagnosis from Referral: Lumbar radiculopathy [M54.16], Dorsalgia [M54.9], Cervical radiculopathy [M54.12]   Evaluation Date: 7/23/2024  Authorization Period Expiration: 12/31/2024   Plan of Care Expiration: 10/23/2024   Progress Note Due: 8/23/2024  Visit # / Visits authorized: 5/20 (+eval)  FOTO: 1/3 (last performed on 7/23/2024)     Precautions: Standard    PTA Visit #: 0/5     Time In: 1700  Time Out: 1759  Total Billable Time: 51 minutes (Billing reflects 1 on 1 treatment time spent with patient)    Subjective     Patient reports: She is feeling much better and notes decreased pain in the neck as well as decreased numbness and tingling in the right hand. Continues to report numbness and tingling in the right foot    He/She was compliant with home exercise program.  Response to previous treatment: no adverse reaction  Functional change: none noted yet    Pain: 5/10     Location: neck, R upper extremity and lower extremity     Objective      Objective Measures updated at progress report or POC update only unless otherwise noted.       Treatment     Shahzad received the treatments listed below:     CPT Intervention Performed   Today Duration / Intensity   MT Soft Tissue Mobilization   Bilateral upper trapezius, levator scapula, cervical paraspinals, and suboccipitals     Joint Mobilization    Manual cervical distraction   TE UBE  x Level 1, 3 mins forward and backward      Pec corner stretch  x 10 x 10" holds     Piriformis stretch   3 x 30" " each LE     Series 6  x 1 min each      Open books  x 15x b      Hamstring stretch                  NMR UBE for postural strength and endurance x 3'/3'     Chin tucks - supine  x 30x      Bilateral ER x Red band 3 x 10     Cervical isometrics - ball on wall  x 2 mins x 10s holds b      Median nerve glides    10x 5s holds b      Sciatic nerve glides  x 2 mins x 5s holds b      Clamshells  x 3x10 b    TA Scapular Retractions x 5# 3 x 10, cues to prevent shoulder shrug     Shoulder Extensions x 5# 3 x 10     Punchouts  x #5 3x10 b      bridges x 3 x 10                                                     PLAN Test neural mobility upper extremity   Abdominal bracing   Posterior pelvic tilt   Sciatic nerve glides   Scalene release with rotation              CPT Codes available for Billing:   (00) minutes of Manual therapy (MT) to improve pain and ROM.  (10) minutes of Therapeutic Exercise (TE) to develop strength, endurance, range of motion, and flexibility.  (25) minutes of Neuromuscular Re-Education (NMR)  to improve: Balance, Coordination, Kinesthetic, Sense, Proprioception, and Posture.  (16) minutes of Therapeutic Activities (TA) to improve functional performance.  Vasopneumatic Device Therapy () for management of swelling/edema. ([id number])  Unattended Electrical Stimulation (ES) for muscle performance or pain modulation.  BFR: Blood flow restriction applied during exercise      Patient Education and Home Exercises       Home Exercises Provided and Patient Education Provided     Education provided: (included in treatment section) minutes  PURPOSE: Patient educated on the impairments noted above and the effects of physical therapy intervention to improve overall condition and QOL.   EXERCISE: Patient was educated on all the above exercise prior/during/after for proper posture, positioning, and execution for safe performance with home exercise program.   STRENGTH: Patient educated on the importance of improved core and  extremity strength in order to improve alignment of the spine and extremities with static positions and dynamic movement.   GAIT & BALANCE: Patient educated on the importance of strong core and lower extremity musculature in order to improve both static and dynamic balance, improve gait mechanics, reduce fall risk and improve household and community mobility.   POSTURE: Patient educated on postural awareness to reduce stress and maintain optimal alignment of the spine with static positions and dynamic movement     Written Home Exercises Provided: yes.  Exercises were reviewed and Shahzad was able to demonstrate them prior to the end of the session.  Shahzad demonstrated good  understanding of the education provided. See EMR under Patient Instructions for exercises provided during therapy sessions.    Assessment     Patient tolerated session well today. Increased focus on lower extremity strength, flexibility and neural mobility due to continues symptoms in her right foot. Incorporated sciatic nerve glides to improve nerve mobility.    Shahzad is progressing well towards her goals.   Patient prognosis is Good.     Patient will continue to benefit from skilled outpatient physical therapy to address the deficits listed in the problem list box on initial evaluation, provide pt/family education and to maximize patient's level of independence in the home and community environment.     Patient's spiritual, cultural and educational needs considered and pt agreeable to plan of care and goals.     Anticipated Barriers for therapy: co-morbidities, sedentary lifestyle, chronicity of condition, lack of understanding of condition, and adherence to treatment plan     Goals:    Short Term Goals:  6 weeks Status  Date Met   PAIN: Pt will report worst pain of 6/10 in order to progress toward max functional ability and improve quality of life. [x] Progressing  [] Met  [] Not Met     FUNCTION: Patient will demonstrate improved function as  indicated by a score of greater than or equal to 54 out of 100 on FOTO. [x] Progressing  [] Met  [] Not Met     MOBILITY: Patient will improve AROM to 50% of stated goals, listed in objective measures above, in order to progress towards independence with functional activities.  [x] Progressing  [] Met  [] Not Met     STRENGTH: Patient will improve strength to 50% of stated goals, listed in objective measures above, in order to progress towards independence with functional activities. [x] Progressing  [] Met  [] Not Met     POSTURE: Patient will correct postural deviations in sitting and standing, to decrease pain and promote long term stability.  [x] Progressing  [] Met  [] Not Met     HEP: Patient will demonstrate independence with HEP in order to progress toward functional independence. [x] Progressing  [] Met  [] Not Met        Long Term Goals:  12 weeks Status Date Met   PAIN: Pt will report worst pain of 3/10 in order to progress toward max functional ability and improve quality of life [x] Progressing  [] Met  [] Not Met     FUNCTION: Patient will demonstrate improved function as indicated by a score of greater than or equal to 60 out of 100 on FOTO. [x] Progressing  [] Met  [] Not Met     MOBILITY: Patient will improve AROM to stated goals, listed in objective measures above, in order to return to maximal functional potential and improve quality of life.  [x] Progressing  [] Met  [] Not Met     STRENGTH: Patient will improve strength to stated goals, listed in objective measures above, in order to improve functional independence and quality of life.  [x] Progressing  [] Met  [] Not Met     GAIT: Patient will demonstrate normalized gait mechanics with minimal compensation in order to return to PLOF. [x] Progressing  [] Met  [] Not Met     Patient will return to normal ADL's, IADL's, community involvement, recreational activities, and work-related activities with less than or equal to 3/10 pain and maximal  function.  [x] Progressing  [] Met  [] Not Met        Plan     Continue Plan of Care (POC) and progress per patient tolerance. See treatment section for details on planned progressions next session.      Monika Romero, PT

## 2024-09-06 NOTE — PROGRESS NOTES
"[facility]   Physical Therapy Treatment Note        Name: Shahzad Redd [facility] Number: [id number]    Therapy Diagnosis:   Encounter Diagnoses   Name Primary?    Decreased functional mobility and endurance Yes    Decreased range of motion of lumbar spine     Decreased range of motion of neck     Proximal muscle weakness     Poor posture      Physician: Arabella Mcintyre, NP    Visit Date: 9/6/2024    Physician Orders: PT Eval and Treat  Medical Diagnosis from Referral: Lumbar radiculopathy [M54.16], Dorsalgia [M54.9], Cervical radiculopathy [M54.12]   Evaluation Date: 7/23/2024  Authorization Period Expiration: 12/31/2024   Plan of Care Expiration: 10/23/2024   Progress Note Due: 8/23/2024  Visit # / Visits authorized: 5/20 (+eval)  FOTO: 1/3 (last performed on 7/23/2024)     Precautions: Standard    PTA Visit #: 0/5     Time In: 0700  Time Out: 0805  Total Billable Time: 57 minutes (Billing reflects 1 on 1 treatment time spent with patient)    Subjective     Patient reports: She's feeling pretty good today. Stated she did not feel any stiffness in the neck this morning and has barely noticed numbness or tingling in the right hand. Continues to note numbness and tingling in the right foot with no significant changes since previous session.    He/She was compliant with home exercise program.  Response to previous treatment: no adverse reaction  Functional change: none noted yet    Pain: 5/10     Location: neck, R upper extremity and lower extremity     Objective      Objective Measures updated at progress report or POC update only unless otherwise noted.       Treatment     Shahzad received the treatments listed below:     CPT Intervention Performed   Today Duration / Intensity   MT Soft Tissue Mobilization x R glutes and piriformis      Joint Mobilization    Manual cervical distraction   TE UBE  x Level 1, 3 mins forward and backward      Pec corner stretch  x 10 x 10" holds     " "Piriformis stretch   3 x 30" each LE     Series 6  x 1 min each      Open books  x 15x b      Hamstring stretch                  NMR UBE for postural strength and endurance x 3'/3'     Chin tucks - supine  x 30x      Bilateral ER x Red band 3 x 10     Cervical isometrics - ball on wall  x 2 mins x 10s holds b      Median nerve glides    10x 5s holds b      Sciatic nerve glides  x 2 mins x 5s holds b      Clamshells  x 3x10 b    TA Scapular Retractions x 5# 3 x 10, cues to prevent shoulder shrug     Shoulder Extensions x 5# 3 x 10     Punchouts  x #5 3x10 b      bridges x 3 x 10                                                     PLAN Test neural mobility upper extremity   Abdominal bracing   Posterior pelvic tilt   Sciatic nerve glides   Scalene release with rotation              CPT Codes available for Billing:   (04) minutes of Manual therapy (MT) to improve pain and ROM.  (16) minutes of Therapeutic Exercise (TE) to develop strength, endurance, range of motion, and flexibility.  (25) minutes of Neuromuscular Re-Education (NMR)  to improve: Balance, Coordination, Kinesthetic, Sense, Proprioception, and Posture.  (12) minutes of Therapeutic Activities (TA) to improve functional performance.  Vasopneumatic Device Therapy () for management of swelling/edema. (24969)  Unattended Electrical Stimulation (ES) for muscle performance or pain modulation.  BFR: Blood flow restriction applied during exercise      Patient Education and Home Exercises       Home Exercises Provided and Patient Education Provided     Education provided: (included in treatment section) minutes  PURPOSE: Patient educated on the impairments noted above and the effects of physical therapy intervention to improve overall condition and QOL.   EXERCISE: Patient was educated on all the above exercise prior/during/after for proper posture, positioning, and execution for safe performance with home exercise program.   STRENGTH: Patient educated on the " importance of improved core and extremity strength in order to improve alignment of the spine and extremities with static positions and dynamic movement.   GAIT & BALANCE: Patient educated on the importance of strong core and lower extremity musculature in order to improve both static and dynamic balance, improve gait mechanics, reduce fall risk and improve household and community mobility.   POSTURE: Patient educated on postural awareness to reduce stress and maintain optimal alignment of the spine with static positions and dynamic movement     Written Home Exercises Provided: yes.  Exercises were reviewed and Shahzad was able to demonstrate them prior to the end of the session.  Shahzad demonstrated good  understanding of the education provided. See EMR under Patient Instructions for exercises provided during therapy sessions.    Assessment     Patient tolerated session well today. Continued previously performed interventions with addition of sciatic nerve guides and hip strengthening to help reduce numbness and tingling in the right foot. Also incorporated soft tissue mobilization to the glutes and piriformis in order to reduce muscle tension. Patient notes a significant reduction in numbness and tingling to the right foot following intervention.    Shahzad is progressing well towards her goals.   Patient prognosis is Good.     Patient will continue to benefit from skilled outpatient physical therapy to address the deficits listed in the problem list box on initial evaluation, provide pt/family education and to maximize patient's level of independence in the home and community environment.     Patient's spiritual, cultural and educational needs considered and pt agreeable to plan of care and goals.     Anticipated Barriers for therapy: co-morbidities, sedentary lifestyle, chronicity of condition, lack of understanding of condition, and adherence to treatment plan     Goals:    Short Term Goals:  6 weeks Status  Date Met    PAIN: Pt will report worst pain of 6/10 in order to progress toward max functional ability and improve quality of life. [x] Progressing  [] Met  [] Not Met     FUNCTION: Patient will demonstrate improved function as indicated by a score of greater than or equal to 54 out of 100 on FOTO. [x] Progressing  [] Met  [] Not Met     MOBILITY: Patient will improve AROM to 50% of stated goals, listed in objective measures above, in order to progress towards independence with functional activities.  [x] Progressing  [] Met  [] Not Met     STRENGTH: Patient will improve strength to 50% of stated goals, listed in objective measures above, in order to progress towards independence with functional activities. [x] Progressing  [] Met  [] Not Met     POSTURE: Patient will correct postural deviations in sitting and standing, to decrease pain and promote long term stability.  [x] Progressing  [] Met  [] Not Met     HEP: Patient will demonstrate independence with HEP in order to progress toward functional independence. [x] Progressing  [] Met  [] Not Met        Long Term Goals:  12 weeks Status Date Met   PAIN: Pt will report worst pain of 3/10 in order to progress toward max functional ability and improve quality of life [x] Progressing  [] Met  [] Not Met     FUNCTION: Patient will demonstrate improved function as indicated by a score of greater than or equal to 60 out of 100 on FOTO. [x] Progressing  [] Met  [] Not Met     MOBILITY: Patient will improve AROM to stated goals, listed in objective measures above, in order to return to maximal functional potential and improve quality of life.  [x] Progressing  [] Met  [] Not Met     STRENGTH: Patient will improve strength to stated goals, listed in objective measures above, in order to improve functional independence and quality of life.  [x] Progressing  [] Met  [] Not Met     GAIT: Patient will demonstrate normalized gait mechanics with minimal compensation in order to return to  PLOF. [x] Progressing  [] Met  [] Not Met     Patient will return to normal ADL's, IADL's, community involvement, recreational activities, and work-related activities with less than or equal to 3/10 pain and maximal function.  [x] Progressing  [] Met  [] Not Met        Plan     Continue Plan of Care (POC) and progress per patient tolerance. See treatment section for details on planned progressions next session.      Monika Romero, PT

## 2024-09-12 ENCOUNTER — CLINICAL SUPPORT (OUTPATIENT)
Dept: REHABILITATION | Facility: HOSPITAL | Age: 64
End: 2024-09-12
Payer: COMMERCIAL

## 2024-09-12 DIAGNOSIS — M62.81 PROXIMAL MUSCLE WEAKNESS: ICD-10-CM

## 2024-09-12 DIAGNOSIS — R29.3 POOR POSTURE: ICD-10-CM

## 2024-09-12 DIAGNOSIS — R29.898 DECREASED RANGE OF MOTION OF NECK: ICD-10-CM

## 2024-09-12 DIAGNOSIS — Z74.09 DECREASED FUNCTIONAL MOBILITY AND ENDURANCE: Primary | ICD-10-CM

## 2024-09-12 DIAGNOSIS — M53.86 DECREASED RANGE OF MOTION OF LUMBAR SPINE: ICD-10-CM

## 2024-09-12 PROCEDURE — 97110 THERAPEUTIC EXERCISES: CPT

## 2024-09-12 PROCEDURE — 97140 MANUAL THERAPY 1/> REGIONS: CPT

## 2024-09-12 PROCEDURE — 97112 NEUROMUSCULAR REEDUCATION: CPT

## 2024-09-16 ENCOUNTER — CLINICAL SUPPORT (OUTPATIENT)
Dept: REHABILITATION | Facility: HOSPITAL | Age: 64
End: 2024-09-16
Payer: COMMERCIAL

## 2024-09-16 DIAGNOSIS — Z74.09 DECREASED FUNCTIONAL MOBILITY AND ENDURANCE: Primary | ICD-10-CM

## 2024-09-16 DIAGNOSIS — M62.81 PROXIMAL MUSCLE WEAKNESS: ICD-10-CM

## 2024-09-16 DIAGNOSIS — R29.898 DECREASED RANGE OF MOTION OF NECK: ICD-10-CM

## 2024-09-16 DIAGNOSIS — R29.3 POOR POSTURE: ICD-10-CM

## 2024-09-16 DIAGNOSIS — M53.86 DECREASED RANGE OF MOTION OF LUMBAR SPINE: ICD-10-CM

## 2024-09-16 PROCEDURE — 97110 THERAPEUTIC EXERCISES: CPT

## 2024-09-16 PROCEDURE — 97112 NEUROMUSCULAR REEDUCATION: CPT

## 2024-09-16 PROCEDURE — 97140 MANUAL THERAPY 1/> REGIONS: CPT

## 2024-09-18 ENCOUNTER — CLINICAL SUPPORT (OUTPATIENT)
Dept: REHABILITATION | Facility: HOSPITAL | Age: 64
End: 2024-09-18
Payer: COMMERCIAL

## 2024-09-18 DIAGNOSIS — M62.81 PROXIMAL MUSCLE WEAKNESS: ICD-10-CM

## 2024-09-18 DIAGNOSIS — R29.3 POOR POSTURE: ICD-10-CM

## 2024-09-18 DIAGNOSIS — M53.86 DECREASED RANGE OF MOTION OF LUMBAR SPINE: ICD-10-CM

## 2024-09-18 DIAGNOSIS — Z74.09 DECREASED FUNCTIONAL MOBILITY AND ENDURANCE: Primary | ICD-10-CM

## 2024-09-18 DIAGNOSIS — R29.898 DECREASED RANGE OF MOTION OF NECK: ICD-10-CM

## 2024-09-18 PROCEDURE — 97140 MANUAL THERAPY 1/> REGIONS: CPT

## 2024-09-18 PROCEDURE — 97110 THERAPEUTIC EXERCISES: CPT

## 2024-09-18 PROCEDURE — 97112 NEUROMUSCULAR REEDUCATION: CPT

## 2024-09-18 NOTE — PROGRESS NOTES
"[facility]   Physical Therapy Treatment Note        Name: Shahzad Redd [facility] Number: [id number]    Therapy Diagnosis:   Encounter Diagnoses   Name Primary?    Decreased functional mobility and endurance Yes    Decreased range of motion of lumbar spine     Decreased range of motion of neck     Proximal muscle weakness     Poor posture      Physician: Arabella Mcintyre, NP    Visit Date: 9/16/2024    Physician Orders: PT Eval and Treat  Medical Diagnosis from Referral: Lumbar radiculopathy [M54.16], Dorsalgia [M54.9], Cervical radiculopathy [M54.12]   Evaluation Date: 7/23/2024  Authorization Period Expiration: 12/31/2024   Plan of Care Expiration: 10/23/2024   Progress Note Due: 8/23/2024  Visit # / Visits authorized: 8/20 (+eval)  FOTO: 1/3 (last performed on 7/23/2024)     Precautions: Standard    PTA Visit #: 0/5     Time In: 1700  Time Out: 1802  Total Billable Time: 54 minutes (Billing reflects 1 on 1 treatment time spent with patient)    Subjective     Patient reports: Patient states she felt great after her last session. However, notes that the next day she ran a lot of errands and walked around a store for a few hours and noted significant pain in the leg. States she did not do anything all weekend for fear of making the pain     He/She was compliant with home exercise program.  Response to previous treatment: no adverse reaction  Functional change: none noted yet    Pain: 5/10     Location: neck, R upper extremity and lower extremity     Objective      Objective Measures updated at progress report or POC update only unless otherwise noted.       Treatment     Shahzad received the treatments listed below:     CPT Intervention Performed   Today Duration / Intensity   MT Soft Tissue Mobilization x R glutes and piriformis      Joint Mobilization    Manual cervical distraction   TE Recumbent bike  x Level 1, 3 mins forward and backward      Pec corner stretch    10 x 10" holds     " Piriformis stretch (supine figure 4 push and pull)  x 2 mins x 10s holds each direction      Series 6    1 min each   30x each with red band and chin tuck hold   B shoulder ER  Pull aparts  Flexion     Open books  x 15x b      Hamstring stretch  x 2x1 min      Figure 4 lower trunk rotations  x 2 mins b      keyla       NMR Chin tucks - supine    30x      Bilateral ER   Red band 3 x 10     Cervical isometrics - ball on wall    2 mins x 10s holds b      Median nerve glides    10x 5s holds b      Sciatic nerve glides - supine 90/90 x 2 mins x 5s holds b      Posterior pelvic tilt  x 2 mins x 5s holds      Clamshells  x 3x10 b    TA Scapular Retractions   5# 3 x 10, cues to prevent shoulder shrug     Shoulder Extensions   5# 3 x 10     Punchouts    #5 3x10 b      bridges x 3 x 10                                                     PLAN Test neural mobility upper extremity   Abdominal bracing   Posterior pelvic tilt   Sciatic nerve glides   Scalene release with rotation              CPT Codes available for Billing:   (12) minutes of Manual therapy (MT) to improve pain and ROM.  (23) minutes of Therapeutic Exercise (TE) to develop strength, endurance, range of motion, and flexibility.  (15) minutes of Neuromuscular Re-Education (NMR)  to improve: Balance, Coordination, Kinesthetic, Sense, Proprioception, and Posture.  (04) minutes of Therapeutic Activities (TA) to improve functional performance.  Vasopneumatic Device Therapy () for management of swelling/edema. (65261)  Unattended Electrical Stimulation (ES) for muscle performance or pain modulation.  BFR: Blood flow restriction applied during exercise      Patient Education and Home Exercises       Home Exercises Provided and Patient Education Provided     Education provided: (included in treatment section) minutes  PURPOSE: Patient educated on the impairments noted above and the effects of physical therapy intervention to improve overall condition and QOL.   EXERCISE:  Patient was educated on all the above exercise prior/during/after for proper posture, positioning, and execution for safe performance with home exercise program.   STRENGTH: Patient educated on the importance of improved core and extremity strength in order to improve alignment of the spine and extremities with static positions and dynamic movement.   GAIT & BALANCE: Patient educated on the importance of strong core and lower extremity musculature in order to improve both static and dynamic balance, improve gait mechanics, reduce fall risk and improve household and community mobility.   POSTURE: Patient educated on postural awareness to reduce stress and maintain optimal alignment of the spine with static positions and dynamic movement     Written Home Exercises Provided: yes.  Exercises were reviewed and Shahzad was able to demonstrate them prior to the end of the session.  Shahzad demonstrated good  understanding of the education provided. See EMR under Patient Instructions for exercises provided during therapy sessions.    Assessment     Pt tolerated session well today. Increased time spent with manual interventions today in order to reduce muscle tension and improve pain. Incorporated piriformis stretch and figure 4 lower trunk rotations to improve hip and lumber mobility and muscle length.     Shahzad is progressing well towards her goals.   Patient prognosis is Good.     Patient will continue to benefit from skilled outpatient physical therapy to address the deficits listed in the problem list box on initial evaluation, provide pt/family education and to maximize patient's level of independence in the home and community environment.     Patient's spiritual, cultural and educational needs considered and pt agreeable to plan of care and goals.     Anticipated Barriers for therapy: co-morbidities, sedentary lifestyle, chronicity of condition, lack of understanding of condition, and adherence to treatment plan      Goals:    Short Term Goals:  6 weeks Status  Date Met   PAIN: Pt will report worst pain of 6/10 in order to progress toward max functional ability and improve quality of life. [x] Progressing  [] Met  [] Not Met     FUNCTION: Patient will demonstrate improved function as indicated by a score of greater than or equal to 54 out of 100 on FOTO. [x] Progressing  [] Met  [] Not Met     MOBILITY: Patient will improve AROM to 50% of stated goals, listed in objective measures above, in order to progress towards independence with functional activities.  [x] Progressing  [] Met  [] Not Met     STRENGTH: Patient will improve strength to 50% of stated goals, listed in objective measures above, in order to progress towards independence with functional activities. [x] Progressing  [] Met  [] Not Met     POSTURE: Patient will correct postural deviations in sitting and standing, to decrease pain and promote long term stability.  [x] Progressing  [] Met  [] Not Met     HEP: Patient will demonstrate independence with HEP in order to progress toward functional independence. [x] Progressing  [] Met  [] Not Met        Long Term Goals:  12 weeks Status Date Met   PAIN: Pt will report worst pain of 3/10 in order to progress toward max functional ability and improve quality of life [x] Progressing  [] Met  [] Not Met     FUNCTION: Patient will demonstrate improved function as indicated by a score of greater than or equal to 60 out of 100 on FOTO. [x] Progressing  [] Met  [] Not Met     MOBILITY: Patient will improve AROM to stated goals, listed in objective measures above, in order to return to maximal functional potential and improve quality of life.  [x] Progressing  [] Met  [] Not Met     STRENGTH: Patient will improve strength to stated goals, listed in objective measures above, in order to improve functional independence and quality of life.  [x] Progressing  [] Met  [] Not Met     GAIT: Patient will demonstrate normalized gait  mechanics with minimal compensation in order to return to PLOF. [x] Progressing  [] Met  [] Not Met     Patient will return to normal ADL's, IADL's, community involvement, recreational activities, and work-related activities with less than or equal to 3/10 pain and maximal function.  [x] Progressing  [] Met  [] Not Met        Plan     Continue Plan of Care (POC) and progress per patient tolerance. See treatment section for details on planned progressions next session.      Monika Romero, PT

## 2024-09-23 NOTE — PROGRESS NOTES
"[facility]   Physical Therapy Treatment Note        Name: Shahzad Redd [facility] Number: [id number]    Therapy Diagnosis:   Encounter Diagnoses   Name Primary?    Decreased functional mobility and endurance Yes    Decreased range of motion of lumbar spine     Decreased range of motion of neck     Proximal muscle weakness     Poor posture      Physician: Arabella Mcintyre, NP    Visit Date: 9/18/2024    Physician Orders: PT Eval and Treat  Medical Diagnosis from Referral: Lumbar radiculopathy [M54.16], Dorsalgia [M54.9], Cervical radiculopathy [M54.12]   Evaluation Date: 7/23/2024  Authorization Period Expiration: 12/31/2024   Plan of Care Expiration: 10/23/2024   Progress Note Due: 8/23/2024  Visit # / Visits authorized: 9/20 (+eval)  FOTO: 1/3 (last performed on 7/23/2024)     Precautions: Standard    PTA Visit #: 0/5     Time In: 1700  Time Out: 1803  Total Billable Time: 56 minutes (Billing reflects 1 on 1 treatment time spent with patient)    Subjective     Patient reports: She is feeling pretty good today. States she continues to get some numbness and tingling to her foot but that it has improved overall. She was very fatigued following previous session    He/She was compliant with home exercise program.  Response to previous treatment: no adverse reaction  Functional change: none noted yet    Pain: 5/10     Location: neck, R upper extremity and lower extremity     Objective      Objective Measures updated at progress report or POC update only unless otherwise noted.       Treatment     Shahzad received the treatments listed below:     CPT Intervention Performed   Today Duration / Intensity   MT Soft Tissue Mobilization x R glutes and piriformis      Joint Mobilization    Manual cervical distraction   TE Recumbent bike  x Level 1, 3 mins forward and backward      Pec corner stretch    10 x 10" holds     Piriformis stretch (supine figure 4 push and pull)  x 2 mins x 10s holds each " direction      Series 6    1 min each   30x each with red band and chin tuck hold   B shoulder ER  Pull aparts  Flexion     Open books  x 15x b      Hamstring stretch  x 2x1 min      Figure 4 lower trunk rotations  x 2 mins b    NMR Chin tucks - supine    30x      Bilateral ER   Red band 3 x 10     Cervical isometrics - ball on wall    2 mins x 10s holds b      Median nerve glides    10x 5s holds b      Sciatic nerve glides - supine 90/90 x 2 mins x 5s holds b      Posterior pelvic tilt  x 2 mins x 5s holds      Clamshells  x 3x10 b      bridges x 3 x 10   TA Scapular Retractions   5# 3 x 10, cues to prevent shoulder shrug     Shoulder Extensions   5# 3 x 10     Punchouts    #5 3x10 b                                                      PLAN               CPT Codes available for Billing:   (14) minutes of Manual therapy (MT) to improve pain and ROM.  (25) minutes of Therapeutic Exercise (TE) to develop strength, endurance, range of motion, and flexibility.  (18) minutes of Neuromuscular Re-Education (NMR)  to improve: Balance, Coordination, Kinesthetic, Sense, Proprioception, and Posture.  (00) minutes of Therapeutic Activities (TA) to improve functional performance.  Vasopneumatic Device Therapy () for management of swelling/edema. ([id number])  Unattended Electrical Stimulation (ES) for muscle performance or pain modulation.  BFR: Blood flow restriction applied during exercise      Patient Education and Home Exercises       Home Exercises Provided and Patient Education Provided     Education provided: (included in treatment section) minutes  PURPOSE: Patient educated on the impairments noted above and the effects of physical therapy intervention to improve overall condition and QOL.   EXERCISE: Patient was educated on all the above exercise prior/during/after for proper posture, positioning, and execution for safe performance with home exercise program.   STRENGTH: Patient educated on the importance of improved core  and extremity strength in order to improve alignment of the spine and extremities with static positions and dynamic movement.   GAIT & BALANCE: Patient educated on the importance of strong core and lower extremity musculature in order to improve both static and dynamic balance, improve gait mechanics, reduce fall risk and improve household and community mobility.   POSTURE: Patient educated on postural awareness to reduce stress and maintain optimal alignment of the spine with static positions and dynamic movement     Written Home Exercises Provided: yes.  Exercises were reviewed and Shahzad was able to demonstrate them prior to the end of the session.  Shahzad demonstrated good  understanding of the education provided. See EMR under Patient Instructions for exercises provided during therapy sessions.    Assessment     Patient tolerated session well today. Continued previously performed interventions due to patient reports of significant fatigue following session. Continue to incorporate soft tissue mobilization to the right hip with immediate relief of nerve symptoms to the foot following intervention.    Shahzad is progressing well towards her goals.   Patient prognosis is Good.     Patient will continue to benefit from skilled outpatient physical therapy to address the deficits listed in the problem list box on initial evaluation, provide pt/family education and to maximize patient's level of independence in the home and community environment.     Patient's spiritual, cultural and educational needs considered and pt agreeable to plan of care and goals.     Anticipated Barriers for therapy: co-morbidities, sedentary lifestyle, chronicity of condition, lack of understanding of condition, and adherence to treatment plan     Goals:    Short Term Goals:  6 weeks Status  Date Met   PAIN: Pt will report worst pain of 6/10 in order to progress toward max functional ability and improve quality of life. [x] Progressing  [] Met  []  Not Met     FUNCTION: Patient will demonstrate improved function as indicated by a score of greater than or equal to 54 out of 100 on FOTO. [x] Progressing  [] Met  [] Not Met     MOBILITY: Patient will improve AROM to 50% of stated goals, listed in objective measures above, in order to progress towards independence with functional activities.  [x] Progressing  [] Met  [] Not Met     STRENGTH: Patient will improve strength to 50% of stated goals, listed in objective measures above, in order to progress towards independence with functional activities. [x] Progressing  [] Met  [] Not Met     POSTURE: Patient will correct postural deviations in sitting and standing, to decrease pain and promote long term stability.  [x] Progressing  [] Met  [] Not Met     HEP: Patient will demonstrate independence with HEP in order to progress toward functional independence. [x] Progressing  [] Met  [] Not Met        Long Term Goals:  12 weeks Status Date Met   PAIN: Pt will report worst pain of 3/10 in order to progress toward max functional ability and improve quality of life [x] Progressing  [] Met  [] Not Met     FUNCTION: Patient will demonstrate improved function as indicated by a score of greater than or equal to 60 out of 100 on FOTO. [x] Progressing  [] Met  [] Not Met     MOBILITY: Patient will improve AROM to stated goals, listed in objective measures above, in order to return to maximal functional potential and improve quality of life.  [x] Progressing  [] Met  [] Not Met     STRENGTH: Patient will improve strength to stated goals, listed in objective measures above, in order to improve functional independence and quality of life.  [x] Progressing  [] Met  [] Not Met     GAIT: Patient will demonstrate normalized gait mechanics with minimal compensation in order to return to PLOF. [x] Progressing  [] Met  [] Not Met     Patient will return to normal ADL's, IADL's, community involvement, recreational activities, and  work-related activities with less than or equal to 3/10 pain and maximal function.  [x] Progressing  [] Met  [] Not Met        Plan     Continue Plan of Care (POC) and progress per patient tolerance. See treatment section for details on planned progressions next session.      Monika Romero, PT

## 2024-09-23 NOTE — PROGRESS NOTES
"[facility]   Physical Therapy Treatment Note        Name: Shahzad Redd [facility] Number: [id number]    Therapy Diagnosis:   Encounter Diagnoses   Name Primary?    Decreased functional mobility and endurance Yes    Decreased range of motion of lumbar spine     Decreased range of motion of neck     Proximal muscle weakness     Poor posture      Physician: Arabella Mcintyre, NP    Visit Date: 9/12/2024    Physician Orders: PT Eval and Treat  Medical Diagnosis from Referral: Lumbar radiculopathy [M54.16], Dorsalgia [M54.9], Cervical radiculopathy [M54.12]   Evaluation Date: 7/23/2024  Authorization Period Expiration: 12/31/2024   Plan of Care Expiration: 10/23/2024   Progress Note Due: 8/23/2024  Visit # / Visits authorized: 7/20 (+eval)  FOTO: 1/3 (last performed on 7/23/2024)     Precautions: Standard    PTA Visit #: 0/5     Time In: 1630  Time Out: 1730  Total Billable Time: 55 minutes (Billing reflects 1 on 1 treatment time spent with patient)    Subjective     Patient reports: She has been feeling better since previous session.     He/She was compliant with home exercise program.  Response to previous treatment: no adverse reaction  Functional change: none noted yet    Pain: 5/10     Location: neck, R upper extremity and lower extremity     Objective      Objective Measures updated at progress report or POC update only unless otherwise noted.       Treatment     Shahzad received the treatments listed below:     CPT Intervention Performed   Today Duration / Intensity   MT Soft Tissue Mobilization x R glutes and piriformis      Joint Mobilization    Manual cervical distraction   TE Recumbent bike  x Level 1, 3 mins forward and backward      Pec corner stretch    10 x 10" holds     Piriformis stretch (supine figure 4 push and pull)  x 2 mins x 10s holds each direction      Series 6    1 min each   30x each with red band and chin tuck hold   B shoulder ER  Pull aparts  Flexion     Open " books  x 15x b      Hamstring stretch  x 2x1 min      Figure 4 lower trunk rotations  x 2 mins b    NMR Chin tucks - supine    30x      Bilateral ER   Red band 3 x 10     Cervical isometrics - ball on wall    2 mins x 10s holds b      Median nerve glides    10x 5s holds b      Sciatic nerve glides - supine 90/90 x 2 mins x 5s holds b      Posterior pelvic tilt  x 2 mins x 5s holds      Clamshells  x 3x10 b      bridges x 3 x 10   TA Scapular Retractions   5# 3 x 10, cues to prevent shoulder shrug     Shoulder Extensions   5# 3 x 10     Punchouts    #5 3x10 b                                                      PLAN               CPT Codes available for Billing:   (12) minutes of Manual therapy (MT) to improve pain and ROM.  (25) minutes of Therapeutic Exercise (TE) to develop strength, endurance, range of motion, and flexibility.  (18) minutes of Neuromuscular Re-Education (NMR)  to improve: Balance, Coordination, Kinesthetic, Sense, Proprioception, and Posture.  (00) minutes of Therapeutic Activities (TA) to improve functional performance.  Vasopneumatic Device Therapy () for management of swelling/edema. (50429)  Unattended Electrical Stimulation (ES) for muscle performance or pain modulation.  BFR: Blood flow restriction applied during exercise      Patient Education and Home Exercises       Home Exercises Provided and Patient Education Provided     Education provided: (included in treatment section) minutes  PURPOSE: Patient educated on the impairments noted above and the effects of physical therapy intervention to improve overall condition and QOL.   EXERCISE: Patient was educated on all the above exercise prior/during/after for proper posture, positioning, and execution for safe performance with home exercise program.   STRENGTH: Patient educated on the importance of improved core and extremity strength in order to improve alignment of the spine and extremities with static positions and dynamic movement.    GAIT & BALANCE: Patient educated on the importance of strong core and lower extremity musculature in order to improve both static and dynamic balance, improve gait mechanics, reduce fall risk and improve household and community mobility.   POSTURE: Patient educated on postural awareness to reduce stress and maintain optimal alignment of the spine with static positions and dynamic movement     Written Home Exercises Provided: yes.  Exercises were reviewed and Shahzad was able to demonstrate them prior to the end of the session.  Shahzad demonstrated good  understanding of the education provided. See EMR under Patient Instructions for exercises provided during therapy sessions.    Assessment     Pt tolerated session well today. Added Figure 4 lower trunk rotations in order to further improve lumbar and hip mobility and allow for improved sciatic nerve mobility. Significant time spent with soft tissue mobilization, after which patient does note improved nerve symptoms to her foot    Shahzad is progressing well towards her goals.   Patient prognosis is Good.     Patient will continue to benefit from skilled outpatient physical therapy to address the deficits listed in the problem list box on initial evaluation, provide pt/family education and to maximize patient's level of independence in the home and community environment.     Patient's spiritual, cultural and educational needs considered and pt agreeable to plan of care and goals.     Anticipated Barriers for therapy: co-morbidities, sedentary lifestyle, chronicity of condition, lack of understanding of condition, and adherence to treatment plan     Goals:    Short Term Goals:  6 weeks Status  Date Met   PAIN: Pt will report worst pain of 6/10 in order to progress toward max functional ability and improve quality of life. [x] Progressing  [] Met  [] Not Met     FUNCTION: Patient will demonstrate improved function as indicated by a score of greater than or equal to 54 out of  100 on FOTO. [x] Progressing  [] Met  [] Not Met     MOBILITY: Patient will improve AROM to 50% of stated goals, listed in objective measures above, in order to progress towards independence with functional activities.  [x] Progressing  [] Met  [] Not Met     STRENGTH: Patient will improve strength to 50% of stated goals, listed in objective measures above, in order to progress towards independence with functional activities. [x] Progressing  [] Met  [] Not Met     POSTURE: Patient will correct postural deviations in sitting and standing, to decrease pain and promote long term stability.  [x] Progressing  [] Met  [] Not Met     HEP: Patient will demonstrate independence with HEP in order to progress toward functional independence. [x] Progressing  [] Met  [] Not Met        Long Term Goals:  12 weeks Status Date Met   PAIN: Pt will report worst pain of 3/10 in order to progress toward max functional ability and improve quality of life [x] Progressing  [] Met  [] Not Met     FUNCTION: Patient will demonstrate improved function as indicated by a score of greater than or equal to 60 out of 100 on FOTO. [x] Progressing  [] Met  [] Not Met     MOBILITY: Patient will improve AROM to stated goals, listed in objective measures above, in order to return to maximal functional potential and improve quality of life.  [x] Progressing  [] Met  [] Not Met     STRENGTH: Patient will improve strength to stated goals, listed in objective measures above, in order to improve functional independence and quality of life.  [x] Progressing  [] Met  [] Not Met     GAIT: Patient will demonstrate normalized gait mechanics with minimal compensation in order to return to PLOF. [x] Progressing  [] Met  [] Not Met     Patient will return to normal ADL's, IADL's, community involvement, recreational activities, and work-related activities with less than or equal to 3/10 pain and maximal function.  [x] Progressing  [] Met  [] Not Met        Plan      Continue Plan of Care (POC) and progress per patient tolerance. See treatment section for details on planned progressions next session.      Monika Romero, PT

## 2024-09-25 ENCOUNTER — TELEPHONE (OUTPATIENT)
Dept: FAMILY MEDICINE | Facility: CLINIC | Age: 64
End: 2024-09-25
Payer: COMMERCIAL

## 2024-09-25 VITALS — SYSTOLIC BLOOD PRESSURE: 120 MMHG | DIASTOLIC BLOOD PRESSURE: 74 MMHG

## 2024-10-01 ENCOUNTER — CLINICAL SUPPORT (OUTPATIENT)
Dept: REHABILITATION | Facility: HOSPITAL | Age: 64
End: 2024-10-01
Payer: COMMERCIAL

## 2024-10-01 DIAGNOSIS — M53.86 DECREASED RANGE OF MOTION OF LUMBAR SPINE: ICD-10-CM

## 2024-10-01 DIAGNOSIS — R29.3 POOR POSTURE: ICD-10-CM

## 2024-10-01 DIAGNOSIS — Z74.09 DECREASED FUNCTIONAL MOBILITY AND ENDURANCE: Primary | ICD-10-CM

## 2024-10-01 DIAGNOSIS — M62.81 PROXIMAL MUSCLE WEAKNESS: ICD-10-CM

## 2024-10-01 DIAGNOSIS — R29.898 DECREASED RANGE OF MOTION OF NECK: ICD-10-CM

## 2024-10-01 PROCEDURE — 97112 NEUROMUSCULAR REEDUCATION: CPT

## 2024-10-01 PROCEDURE — 97110 THERAPEUTIC EXERCISES: CPT

## 2024-10-01 PROCEDURE — 97012 MECHANICAL TRACTION THERAPY: CPT

## 2024-10-01 NOTE — PROGRESS NOTES
"[facility]   Physical Therapy Treatment Note        Name: Shahzad Redd [facility] Number: [id number]    Therapy Diagnosis:   Encounter Diagnoses   Name Primary?    Decreased functional mobility and endurance Yes    Decreased range of motion of lumbar spine     Decreased range of motion of neck     Proximal muscle weakness     Poor posture      Physician: Arabella Mcintyre, NP    Visit Date: 10/1/2024    Physician Orders: PT Eval and Treat  Medical Diagnosis from Referral: Lumbar radiculopathy [M54.16], Dorsalgia [M54.9], Cervical radiculopathy [M54.12]   Evaluation Date: 7/23/2024  Authorization Period Expiration: 12/31/2024   Plan of Care Expiration: 10/23/2024   Progress Note Due: 8/23/2024  Visit # / Visits authorized: 10/20 (+eval)  FOTO: 1/3 (last performed on 7/23/2024)     Precautions: Standard    PTA Visit #: 0/5     Time In: 0700  Time Out: 0802  Total Billable Time: 52 minutes (Billing reflects 1 on 1 treatment time spent with patient)    Subjective     Patient reports: She is feeling okay overall but continues to have numbness in the R hand and foot.     He/She was compliant with home exercise program.  Response to previous treatment: no adverse reaction  Functional change: none noted yet    Pain: 5/10     Location: neck, R upper extremity and lower extremity     Objective      Objective Measures updated at progress report or POC update only unless otherwise noted.       Treatment     Shahzad received the treatments listed below:     CPT Intervention Performed   Today Duration / Intensity   MT Soft Tissue Mobilization x R glutes and piriformis      Joint Mobilization    Manual cervical distraction   TE Recumbent bike  x Level 1, 3 mins forward and backward      Pec corner stretch    10 x 10" holds     Piriformis stretch (supine figure 4 push and pull)  x 2 mins x 10s holds each direction      Series 6    1 min each   30x each with red band and chin tuck hold   B shoulder " ER  Pull aparts  Flexion     Open books  x 15x b      Hamstring stretch  x 2x1 min      Figure 4 lower trunk rotations    2 mins b      First rib stretch  x 3x30s b      Foam rolling  x R glutes/piriformis- 2 mins    NMR Chin tucks - supine    30x      Bilateral ER   Red band 3 x 10     Cervical isometrics - ball on wall    2 mins x 10s holds b      Median nerve glides  x 20x 5s holds b      Sciatic nerve glides - supine 90/90 x 2 mins x 5s holds b      Posterior pelvic tilt    2 mins x 5s holds      Clamshells    3x10 b      bridges x 3 x 10   TA Scapular Retractions   5# 3 x 10, cues to prevent shoulder shrug     Shoulder Extensions   5# 3 x 10     Punchouts    #5 3x10 b                                            Traction Cervical traction  x 4 rounds with 2 mins on, 1 min off    PLAN               CPT Codes available for Billing:   (05) minutes of Manual therapy (MT) to improve pain and ROM.  (25) minutes of Therapeutic Exercise (TE) to develop strength, endurance, range of motion, and flexibility.  (10) minutes of Neuromuscular Re-Education (NMR)  to improve: Balance, Coordination, Kinesthetic, Sense, Proprioception, and Posture.  (00) minutes of Therapeutic Activities (TA) to improve functional performance.  (12) minutes of Mechanical Traction   Vasopneumatic Device Therapy () for management of swelling/edema. (58101)  Unattended Electrical Stimulation (ES) for muscle performance or pain modulation.  BFR: Blood flow restriction applied during exercise         Patient Education and Home Exercises       Home Exercises Provided and Patient Education Provided     Education provided: (included in treatment section) minutes  PURPOSE: Patient educated on the impairments noted above and the effects of physical therapy intervention to improve overall condition and QOL.   EXERCISE: Patient was educated on all the above exercise prior/during/after for proper posture, positioning, and execution for safe performance with  home exercise program.   STRENGTH: Patient educated on the importance of improved core and extremity strength in order to improve alignment of the spine and extremities with static positions and dynamic movement.   GAIT & BALANCE: Patient educated on the importance of strong core and lower extremity musculature in order to improve both static and dynamic balance, improve gait mechanics, reduce fall risk and improve household and community mobility.   POSTURE: Patient educated on postural awareness to reduce stress and maintain optimal alignment of the spine with static positions and dynamic movement     Written Home Exercises Provided: yes.  Exercises were reviewed and Shahzad was able to demonstrate them prior to the end of the session.  Shahzad demonstrated good  understanding of the education provided. See EMR under Patient Instructions for exercises provided during therapy sessions.    Assessment     Pt tolerated session well today. Added mechanical traction in order to reduce pressure on cervical structures; pt notes reduction in numbness and tingling in her hand following. Incorporated foam rolling to reduce muscle tension in the hip with significant decrease in muscle tension and numbness in the foot following intervention     Shahzad is progressing well towards her goals.   Patient prognosis is Good.     Patient will continue to benefit from skilled outpatient physical therapy to address the deficits listed in the problem list box on initial evaluation, provide pt/family education and to maximize patient's level of independence in the home and community environment.     Patient's spiritual, cultural and educational needs considered and pt agreeable to plan of care and goals.     Anticipated Barriers for therapy: co-morbidities, sedentary lifestyle, chronicity of condition, lack of understanding of condition, and adherence to treatment plan     Goals:    Short Term Goals:  6 weeks Status  Date Met   PAIN: Pt will  report worst pain of 6/10 in order to progress toward max functional ability and improve quality of life. [x] Progressing  [] Met  [] Not Met     FUNCTION: Patient will demonstrate improved function as indicated by a score of greater than or equal to 54 out of 100 on FOTO. [x] Progressing  [] Met  [] Not Met     MOBILITY: Patient will improve AROM to 50% of stated goals, listed in objective measures above, in order to progress towards independence with functional activities.  [x] Progressing  [] Met  [] Not Met     STRENGTH: Patient will improve strength to 50% of stated goals, listed in objective measures above, in order to progress towards independence with functional activities. [x] Progressing  [] Met  [] Not Met     POSTURE: Patient will correct postural deviations in sitting and standing, to decrease pain and promote long term stability.  [x] Progressing  [] Met  [] Not Met     HEP: Patient will demonstrate independence with HEP in order to progress toward functional independence. [x] Progressing  [] Met  [] Not Met        Long Term Goals:  12 weeks Status Date Met   PAIN: Pt will report worst pain of 3/10 in order to progress toward max functional ability and improve quality of life [x] Progressing  [] Met  [] Not Met     FUNCTION: Patient will demonstrate improved function as indicated by a score of greater than or equal to 60 out of 100 on FOTO. [x] Progressing  [] Met  [] Not Met     MOBILITY: Patient will improve AROM to stated goals, listed in objective measures above, in order to return to maximal functional potential and improve quality of life.  [x] Progressing  [] Met  [] Not Met     STRENGTH: Patient will improve strength to stated goals, listed in objective measures above, in order to improve functional independence and quality of life.  [x] Progressing  [] Met  [] Not Met     GAIT: Patient will demonstrate normalized gait mechanics with minimal compensation in order to return to PLOF. [x]  Progressing  [] Met  [] Not Met     Patient will return to normal ADL's, IADL's, community involvement, recreational activities, and work-related activities with less than or equal to 3/10 pain and maximal function.  [x] Progressing  [] Met  [] Not Met        Plan     Continue Plan of Care (POC) and progress per patient tolerance. See treatment section for details on planned progressions next session.      Monika Romero, PT

## 2024-10-03 ENCOUNTER — CLINICAL SUPPORT (OUTPATIENT)
Dept: REHABILITATION | Facility: HOSPITAL | Age: 64
End: 2024-10-03
Payer: COMMERCIAL

## 2024-10-03 DIAGNOSIS — R29.898 DECREASED RANGE OF MOTION OF NECK: ICD-10-CM

## 2024-10-03 DIAGNOSIS — M53.86 DECREASED RANGE OF MOTION OF LUMBAR SPINE: ICD-10-CM

## 2024-10-03 DIAGNOSIS — Z74.09 DECREASED FUNCTIONAL MOBILITY AND ENDURANCE: Primary | ICD-10-CM

## 2024-10-03 DIAGNOSIS — M62.81 PROXIMAL MUSCLE WEAKNESS: ICD-10-CM

## 2024-10-03 DIAGNOSIS — R29.3 POOR POSTURE: ICD-10-CM

## 2024-10-03 PROCEDURE — 97012 MECHANICAL TRACTION THERAPY: CPT

## 2024-10-03 PROCEDURE — 97110 THERAPEUTIC EXERCISES: CPT

## 2024-10-03 PROCEDURE — 97140 MANUAL THERAPY 1/> REGIONS: CPT

## 2024-10-03 PROCEDURE — 97112 NEUROMUSCULAR REEDUCATION: CPT

## 2024-10-03 NOTE — PROGRESS NOTES
"[facility]   Physical Therapy Treatment Note        Name: Shahzad [facility] Number: [id number]    Therapy Diagnosis:   Encounter Diagnoses   Name Primary?    Decreased functional mobility and endurance Yes    Decreased range of motion of lumbar spine     Decreased range of motion of neck     Proximal muscle weakness     Poor posture      Physician: Arabella Mcintyre, NP    Visit Date: 10/3/2024    Physician Orders: PT Eval and Treat  Medical Diagnosis from Referral: Lumbar radiculopathy [M54.16], Dorsalgia [M54.9], Cervical radiculopathy [M54.12]   Evaluation Date: 7/23/2024  Authorization Period Expiration: 12/31/2024   Plan of Care Expiration: 10/23/2024   Progress Note Due: 8/23/2024  Visit # / Visits authorized: 11/20 (+eval)  FOTO: 1/3 (last performed on 7/23/2024)     Precautions: Standard    PTA Visit #: 0/5     Time In: 0700  Time Out: 0802  Total Billable Time: 59 minutes (Billing reflects 1 on 1 treatment time spent with patient)    Subjective     Patient reports: She is feeling much better and has noticed significant improvement in numbness since previous session     He/She was compliant with home exercise program.  Response to previous treatment: no adverse reaction  Functional change: none noted yet    Pain: 5/10     Location: neck, R upper extremity and lower extremity     Objective      Objective Measures updated at progress report or POC update only unless otherwise noted.       Treatment     Shahzad received the treatments listed below:     CPT Intervention Performed   Today Duration / Intensity   MT Soft Tissue Mobilization x R glutes and piriformis      Joint Mobilization    Manual cervical distraction   TE Recumbent bike  x Level 1, 3 mins forward and backward      Pec corner stretch    10 x 10" holds     Piriformis stretch (supine figure 4 push and pull)  x 2 mins x 10s holds each direction      Series 6    1 min each   30x each with red band and chin tuck hold "   B shoulder ER  Pull aparts  Flexion     Open books  x 15x b      Hamstring stretch  x 2x1 min      Figure 4 lower trunk rotations    2 mins b      First rib stretch  x 3x30s b      Foam rolling  x R glutes/piriformis- 2 mins    NMR Chin tucks - supine    30x      Bilateral ER   Red band 3 x 10     Cervical isometrics - ball on wall    2 mins x 10s holds b      Median nerve glides  x 20x 5s holds b      Sciatic nerve glides - supine 90/90 x 2 mins x 5s holds b      Posterior pelvic tilt    2 mins x 5s holds      Clamshells    3x10 b      bridges x 3 x 10   TA Scapular Retractions   5# 3 x 10, cues to prevent shoulder shrug     Shoulder Extensions   5# 3 x 10     Punchouts    #5 3x10 b                                            Traction Cervical traction  x 4 rounds with 2 mins on, 1 min off    PLAN               CPT Codes available for Billing:   (10) minutes of Manual therapy (MT) to improve pain and ROM.  (25) minutes of Therapeutic Exercise (TE) to develop strength, endurance, range of motion, and flexibility.  (12) minutes of Neuromuscular Re-Education (NMR)  to improve: Balance, Coordination, Kinesthetic, Sense, Proprioception, and Posture.  (00) minutes of Therapeutic Activities (TA) to improve functional performance.  (12) minutes of Mechanical Traction   Vasopneumatic Device Therapy () for management of swelling/edema. (11078)  Unattended Electrical Stimulation (ES) for muscle performance or pain modulation.  BFR: Blood flow restriction applied during exercise      Patient Education and Home Exercises       Home Exercises Provided and Patient Education Provided     Education provided: (included in treatment section) minutes  PURPOSE: Patient educated on the impairments noted above and the effects of physical therapy intervention to improve overall condition and QOL.   EXERCISE: Patient was educated on all the above exercise prior/during/after for proper posture, positioning, and execution for safe  performance with home exercise program.   STRENGTH: Patient educated on the importance of improved core and extremity strength in order to improve alignment of the spine and extremities with static positions and dynamic movement.   GAIT & BALANCE: Patient educated on the importance of strong core and lower extremity musculature in order to improve both static and dynamic balance, improve gait mechanics, reduce fall risk and improve household and community mobility.   POSTURE: Patient educated on postural awareness to reduce stress and maintain optimal alignment of the spine with static positions and dynamic movement     Written Home Exercises Provided: yes.  Exercises were reviewed and Shahzad was able to demonstrate them prior to the end of the session.  Shahzad demonstrated good  understanding of the education provided. See EMR under Patient Instructions for exercises provided during therapy sessions.    Assessment     Pt tolerated session well today. Continue to incorporated foam rolling and cervical traction which help to significantly reduce nerve symptoms in the R hand and foot.     Shahzad is progressing well towards her goals.   Patient prognosis is Good.     Patient will continue to benefit from skilled outpatient physical therapy to address the deficits listed in the problem list box on initial evaluation, provide pt/family education and to maximize patient's level of independence in the home and community environment.     Patient's spiritual, cultural and educational needs considered and pt agreeable to plan of care and goals.     Anticipated Barriers for therapy: co-morbidities, sedentary lifestyle, chronicity of condition, lack of understanding of condition, and adherence to treatment plan     Goals:    Short Term Goals:  6 weeks Status  Date Met   PAIN: Pt will report worst pain of 6/10 in order to progress toward max functional ability and improve quality of life. [x] Progressing  [] Met  [] Not Met      FUNCTION: Patient will demonstrate improved function as indicated by a score of greater than or equal to 54 out of 100 on FOTO. [x] Progressing  [] Met  [] Not Met     MOBILITY: Patient will improve AROM to 50% of stated goals, listed in objective measures above, in order to progress towards independence with functional activities.  [x] Progressing  [] Met  [] Not Met     STRENGTH: Patient will improve strength to 50% of stated goals, listed in objective measures above, in order to progress towards independence with functional activities. [x] Progressing  [] Met  [] Not Met     POSTURE: Patient will correct postural deviations in sitting and standing, to decrease pain and promote long term stability.  [x] Progressing  [] Met  [] Not Met     HEP: Patient will demonstrate independence with HEP in order to progress toward functional independence. [x] Progressing  [] Met  [] Not Met        Long Term Goals:  12 weeks Status Date Met   PAIN: Pt will report worst pain of 3/10 in order to progress toward max functional ability and improve quality of life [x] Progressing  [] Met  [] Not Met     FUNCTION: Patient will demonstrate improved function as indicated by a score of greater than or equal to 60 out of 100 on FOTO. [x] Progressing  [] Met  [] Not Met     MOBILITY: Patient will improve AROM to stated goals, listed in objective measures above, in order to return to maximal functional potential and improve quality of life.  [x] Progressing  [] Met  [] Not Met     STRENGTH: Patient will improve strength to stated goals, listed in objective measures above, in order to improve functional independence and quality of life.  [x] Progressing  [] Met  [] Not Met     GAIT: Patient will demonstrate normalized gait mechanics with minimal compensation in order to return to PLOF. [x] Progressing  [] Met  [] Not Met     Patient will return to normal ADL's, IADL's, community involvement, recreational activities, and work-related  activities with less than or equal to 3/10 pain and maximal function.  [x] Progressing  [] Met  [] Not Met        Plan     Continue Plan of Care (POC) and progress per patient tolerance. See treatment section for details on planned progressions next session.      Monika Romero, PT

## 2024-10-11 ENCOUNTER — CLINICAL SUPPORT (OUTPATIENT)
Dept: REHABILITATION | Facility: HOSPITAL | Age: 64
End: 2024-10-11
Payer: COMMERCIAL

## 2024-10-11 DIAGNOSIS — M53.86 DECREASED RANGE OF MOTION OF LUMBAR SPINE: ICD-10-CM

## 2024-10-11 DIAGNOSIS — R29.898 DECREASED RANGE OF MOTION OF NECK: ICD-10-CM

## 2024-10-11 DIAGNOSIS — M62.81 PROXIMAL MUSCLE WEAKNESS: ICD-10-CM

## 2024-10-11 DIAGNOSIS — Z74.09 DECREASED FUNCTIONAL MOBILITY AND ENDURANCE: Primary | ICD-10-CM

## 2024-10-11 DIAGNOSIS — R29.3 POOR POSTURE: ICD-10-CM

## 2024-10-11 PROCEDURE — 97012 MECHANICAL TRACTION THERAPY: CPT

## 2024-10-11 PROCEDURE — 97112 NEUROMUSCULAR REEDUCATION: CPT

## 2024-10-11 PROCEDURE — 97110 THERAPEUTIC EXERCISES: CPT

## 2024-10-11 PROCEDURE — 97530 THERAPEUTIC ACTIVITIES: CPT

## 2024-10-11 PROCEDURE — 97140 MANUAL THERAPY 1/> REGIONS: CPT

## 2024-10-15 NOTE — PROGRESS NOTES
"[facility]   Physical Therapy Treatment Note        Name: Shahzad [facility] Number: [id number]    Therapy Diagnosis:   Encounter Diagnoses   Name Primary?    Decreased functional mobility and endurance Yes    Decreased range of motion of lumbar spine     Decreased range of motion of neck     Proximal muscle weakness     Poor posture      Physician: Arabella Mcintyre, NP    Visit Date: 10/11/2024    Physician Orders: PT Eval and Treat  Medical Diagnosis from Referral: Lumbar radiculopathy [M54.16], Dorsalgia [M54.9], Cervical radiculopathy [M54.12]   Evaluation Date: 7/23/2024  Authorization Period Expiration: 12/31/2024   Plan of Care Expiration: 10/23/2024   Progress Note Due: 8/23/2024  Visit # / Visits authorized: 12/20 (+eval)  FOTO: 1/3 (last performed on 7/23/2024)     Precautions: Standard    PTA Visit #: 0/5     Time In: 0700  Time Out: 0804  Total Billable Time: 56 minutes (Billing reflects 1 on 1 treatment time spent with patient)    Subjective     Patient reports: She is feeling better overall and has noted improvement in numbness and tingling in her hand and foot    He/She was compliant with home exercise program.  Response to previous treatment: no adverse reaction  Functional change: none noted yet    Pain: 5/10     Location: neck, R upper extremity and lower extremity     Objective      Objective Measures updated at progress report or POC update only unless otherwise noted.       Treatment     Shahzad received the treatments listed below:     CPT Intervention Performed   Today Duration / Intensity   MT Soft Tissue Mobilization x R glutes and piriformis      Joint Mobilization    Manual cervical distraction   TE Recumbent bike  x Level 1, 3 mins forward and backward      Pec corner stretch    10 x 10" holds     Piriformis stretch (supine figure 4 push and pull)  x 2 mins x 10s holds each direction      Series 6    1 min each   30x each with red band and chin tuck hold "   B shoulder ER  Pull aparts  Flexion     Open books  x 15x b      Hamstring stretch  x 2x1 min      Figure 4 lower trunk rotations    2 mins b      First rib stretch  x 3x30s  on R      Foam rolling  x R glutes/piriformis- 2 mins    NMR Chin tucks - supine    30x      Bilateral ER x Red band 3 x 10     Cervical isometrics - ball on wall    2 mins x 10s holds b      Median nerve glides    20x 5s holds b      Sciatic nerve glides - supine 90/90 x 2 mins x 5s holds b      Posterior pelvic tilt    2 mins x 5s holds      Clamshells  x 3x10 b      bridges x 3 x 10   TA Scapular Retractions x 5# 3 x 10, cues to prevent shoulder shrug     Shoulder Extensions x 5# 3 x 10     Punchouts    #5 3x10 b                                            Traction Cervical traction  x 4 rounds with 2 mins on, 1 min off    PLAN               CPT Codes available for Billing:   (08) minutes of Manual therapy (MT) to improve pain and ROM.  (13) minutes of Therapeutic Exercise (TE) to develop strength, endurance, range of motion, and flexibility.  (15) minutes of Neuromuscular Re-Education (NMR)  to improve: Balance, Coordination, Kinesthetic, Sense, Proprioception, and Posture.  (08) minutes of Therapeutic Activities (TA) to improve functional performance.  (12) minutes of Mechanical Traction   Vasopneumatic Device Therapy () for management of swelling/edema. (75939)  Unattended Electrical Stimulation (ES) for muscle performance or pain modulation.  BFR: Blood flow restriction applied during exercise      Patient Education and Home Exercises       Home Exercises Provided and Patient Education Provided     Education provided: (included in treatment section) minutes  PURPOSE: Patient educated on the impairments noted above and the effects of physical therapy intervention to improve overall condition and QOL.   EXERCISE: Patient was educated on all the above exercise prior/during/after for proper posture, positioning, and execution for safe  performance with home exercise program.   STRENGTH: Patient educated on the importance of improved core and extremity strength in order to improve alignment of the spine and extremities with static positions and dynamic movement.   GAIT & BALANCE: Patient educated on the importance of strong core and lower extremity musculature in order to improve both static and dynamic balance, improve gait mechanics, reduce fall risk and improve household and community mobility.   POSTURE: Patient educated on postural awareness to reduce stress and maintain optimal alignment of the spine with static positions and dynamic movement     Written Home Exercises Provided: yes.  Exercises were reviewed and Shahzad was able to demonstrate them prior to the end of the session.  Shahzad demonstrated good  understanding of the education provided. See EMR under Patient Instructions for exercises provided during therapy sessions.    Assessment     Patient tolerated session well today. Continue to incorporate film rolling and soft tissue mobilization to decrease muscle tension in the hip. Patient notes immediate relief of numbness and tingling in the foot. Also incorporated surgical traction with immediate reduction in numbness and tingling in the right hand    Shahzad is progressing well towards her goals.   Patient prognosis is Good.     Patient will continue to benefit from skilled outpatient physical therapy to address the deficits listed in the problem list box on initial evaluation, provide pt/family education and to maximize patient's level of independence in the home and community environment.     Patient's spiritual, cultural and educational needs considered and pt agreeable to plan of care and goals.     Anticipated Barriers for therapy: co-morbidities, sedentary lifestyle, chronicity of condition, lack of understanding of condition, and adherence to treatment plan     Goals:    Short Term Goals:  6 weeks Status  Date Met   PAIN: Pt will  report worst pain of 6/10 in order to progress toward max functional ability and improve quality of life. [x] Progressing  [] Met  [] Not Met     FUNCTION: Patient will demonstrate improved function as indicated by a score of greater than or equal to 54 out of 100 on FOTO. [x] Progressing  [] Met  [] Not Met     MOBILITY: Patient will improve AROM to 50% of stated goals, listed in objective measures above, in order to progress towards independence with functional activities.  [x] Progressing  [] Met  [] Not Met     STRENGTH: Patient will improve strength to 50% of stated goals, listed in objective measures above, in order to progress towards independence with functional activities. [x] Progressing  [] Met  [] Not Met     POSTURE: Patient will correct postural deviations in sitting and standing, to decrease pain and promote long term stability.  [x] Progressing  [] Met  [] Not Met     HEP: Patient will demonstrate independence with HEP in order to progress toward functional independence. [x] Progressing  [] Met  [] Not Met        Long Term Goals:  12 weeks Status Date Met   PAIN: Pt will report worst pain of 3/10 in order to progress toward max functional ability and improve quality of life [x] Progressing  [] Met  [] Not Met     FUNCTION: Patient will demonstrate improved function as indicated by a score of greater than or equal to 60 out of 100 on FOTO. [x] Progressing  [] Met  [] Not Met     MOBILITY: Patient will improve AROM to stated goals, listed in objective measures above, in order to return to maximal functional potential and improve quality of life.  [x] Progressing  [] Met  [] Not Met     STRENGTH: Patient will improve strength to stated goals, listed in objective measures above, in order to improve functional independence and quality of life.  [x] Progressing  [] Met  [] Not Met     GAIT: Patient will demonstrate normalized gait mechanics with minimal compensation in order to return to PLOF. [x]  Progressing  [] Met  [] Not Met     Patient will return to normal ADL's, IADL's, community involvement, recreational activities, and work-related activities with less than or equal to 3/10 pain and maximal function.  [x] Progressing  [] Met  [] Not Met        Plan     Continue Plan of Care (POC) and progress per patient tolerance. See treatment section for details on planned progressions next session.      Monika Romero, PT

## 2024-10-18 ENCOUNTER — CLINICAL SUPPORT (OUTPATIENT)
Dept: REHABILITATION | Facility: HOSPITAL | Age: 64
End: 2024-10-18
Payer: COMMERCIAL

## 2024-10-18 DIAGNOSIS — M53.86 DECREASED RANGE OF MOTION OF LUMBAR SPINE: ICD-10-CM

## 2024-10-18 DIAGNOSIS — Z74.09 DECREASED FUNCTIONAL MOBILITY AND ENDURANCE: Primary | ICD-10-CM

## 2024-10-18 DIAGNOSIS — R29.898 DECREASED RANGE OF MOTION OF NECK: ICD-10-CM

## 2024-10-18 DIAGNOSIS — R29.3 POOR POSTURE: ICD-10-CM

## 2024-10-18 DIAGNOSIS — M62.81 PROXIMAL MUSCLE WEAKNESS: ICD-10-CM

## 2024-10-18 PROCEDURE — 97530 THERAPEUTIC ACTIVITIES: CPT

## 2024-10-18 PROCEDURE — 97140 MANUAL THERAPY 1/> REGIONS: CPT

## 2024-10-18 PROCEDURE — 97112 NEUROMUSCULAR REEDUCATION: CPT

## 2024-10-18 PROCEDURE — 97110 THERAPEUTIC EXERCISES: CPT

## 2024-10-19 NOTE — PROGRESS NOTES
[facility]   Physical Therapy Treatment Note / Progress Note        Name: Shahzad Bautistamievaristo  [facility] Number: [id number]    Therapy Diagnosis:   Encounter Diagnoses   Name Primary?    Decreased functional mobility and endurance Yes    Decreased range of motion of lumbar spine     Decreased range of motion of neck     Proximal muscle weakness     Poor posture      Physician: Arabella Mcintyre NP    Visit Date: 10/18/2024    Physician Orders: PT Eval and Treat  Medical Diagnosis from Referral: Lumbar radiculopathy [M54.16], Dorsalgia [M54.9], Cervical radiculopathy [M54.12]   Evaluation Date: 7/23/2024  Authorization Period Expiration: 12/31/2024   Plan of Care Expiration: 10/23/2024   Progress Note Due: 8/23/2024  Visit # / Visits authorized: 13/20 (+eval)  FOTO: 1/3 (last performed on 7/23/2024)     Precautions: Standard    PTA Visit #: 0/5     Time In: 0700  Time Out: 0804  Total Billable Time: 56 minutes (Billing reflects 1 on 1 treatment time spent with patient)    Subjective     Patient reports: She is feeling much better overall and notes only mild and occasional numbness and tingling in the R hand and foot. States she has an appointment with a spine doctor soon     He/She was compliant with home exercise program.  Response to previous treatment: no adverse reaction  Functional change: none noted yet    Pain: 5/10     Location: neck, R upper extremity and lower extremity     Objective      Objective Measures updated at progress report or POC update only unless otherwise noted.     RANGE OF MOTION: (* denotes pain)  Cervical Right   (spine) Left     Dysfunction with Movement Goal   Cervical Flexion (60º) 45 ---   45   Cervical Extension (80º) 53 ---       Cervical Side Bending (45º) 35 35   35   Cervical Rotation (75º) 65 65          Shoulder AROM/PROM Right Left Dysfunction with Movement Goal   Shoulder Flexion (180º) 180 180       Shoulder Abduction (180º) 180 180       Shoulder  Extension (60º) 60 60       Shoulder ER at 90º (90º)           Shoulder IR at 90º (70º)           Functional ER T4 occiput       Functional IR scapula scapula          Lumbar ROM Right  (spine) Left    Dysfunction with Movement Goal   Lumbar Flexion (60º) 75% ---   100%   Lumbar Extension (30º) 50% ---   50%   Lumbar Side Bending (25º) 75% 75%   100%   Lumbar Rotation 75% 75%          STRENGTH: (* denotes pain)  U/E MMT Right Left Dysfunction with Movement Goal   Shoulder Flexion 4+/5 4+/5   4+/5 B   Shoulder Extension 4+/5 4+/5   4+/5 B   Shoulder Abduction 4+/5 4+/5   4+/5 B   Shoulder IR 5/5 5/5   4+/5 B   Shoulder ER 4+/5 4+/5   4+/5 B      L/E MMT Right  (spine) Left Dysfunction with Movement Goal   Modified (90/90) Abdominal Strength    ---       Hip Flexion  4+/5 4+/5   4+/5 B   Hip Extension  4/5 4/5   4+/5 B   Hip Abduction  4+/5 4+/5   4+/5 B   Knee Extension 5/5 5/5   5/5 B   Knee Flexion 4+/5 4+/5   5/5 B   Hip IR       4+/5 B   Hip ER       4+/5 B   Ankle DF       5/5 B   Ankle PF       5/5 B   Ankle Inversion       5/5 B   Ankle Eversion       5/5 B            SPECIAL TESTS:     Right  (spine) Left  Goal   Cervical compression  Positive   Negative B    Cervical distraction  Positive   Negative B    Lumbar compression  Negative   Negative B    Lumbar distraction  Negative   Negative B    Slump  Negative Negative Negative B          Negative B          Negative B          SENSATION: Intact to Light Touch                  POSTURE: Pt presents with postural abnormalities which include: forward head, rounded shoulders , and increased thoracic kyphosis         GAIT ANALYSIS The patient ambulated with the following assistive device: none; the pt presents with the following gait abnormalities: none        Function:         Treatment     Shahzad received the treatments listed below:     CPT Intervention Performed   Today Duration / Intensity   MT Soft Tissue Mobilization x R glutes and piriformis      Joint  "Mobilization    Manual cervical distraction   TE Recumbent bike  x Level 1, 3 mins forward and backward      Pec corner stretch    10 x 10" holds     Piriformis stretch (supine figure 4 push and pull)  x 2 mins x 10s holds each direction      Series 6    1 min each   30x each with red band and chin tuck hold   B shoulder ER  Pull aparts  Flexion     Open books  x 15x b      Hamstring stretch  x 2x1 min      Figure 4 lower trunk rotations    2 mins b      First rib stretch  x 3x30s  on R      Foam rolling  x R glutes/piriformis- 2 mins    NMR Chin tucks - supine    30x      Bilateral ER   Red band 3 x 10     Cervical isometrics - ball on wall    2 mins x 10s holds b      Median nerve glides    20x 5s holds b      Sciatic nerve glides - supine 90/90 x 2 mins x 5s holds b      Posterior pelvic tilt    2 mins x 5s holds      Clamshells  x 3x10 b      bridges x 3 x 10   TA Scapular Retractions   5# 3 x 10, cues to prevent shoulder shrug     Shoulder Extensions   5# 3 x 10     Punchouts    #5 3x10 b                                            Traction Cervical traction  x 4 rounds with 2 mins on, 1 min off    PLAN               CPT Codes available for Billing:   (08) minutes of Manual therapy (MT) to improve pain and ROM.  (13) minutes of Therapeutic Exercise (TE) to develop strength, endurance, range of motion, and flexibility.  (15) minutes of Neuromuscular Re-Education (NMR)  to improve: Balance, Coordination, Kinesthetic, Sense, Proprioception, and Posture.  (08) minutes of Therapeutic Activities (TA) to improve functional performance.  (12) minutes of Mechanical Traction   Vasopneumatic Device Therapy () for management of swelling/edema. ([id number])  Unattended Electrical Stimulation (ES) for muscle performance or pain modulation.  BFR: Blood flow restriction applied during exercise      Patient Education and Home Exercises       Home Exercises Provided and Patient Education Provided     Education provided: (included in " treatment section) minutes  PURPOSE: Patient educated on the impairments noted above and the effects of physical therapy intervention to improve overall condition and QOL.   EXERCISE: Patient was educated on all the above exercise prior/during/after for proper posture, positioning, and execution for safe performance with home exercise program.   STRENGTH: Patient educated on the importance of improved core and extremity strength in order to improve alignment of the spine and extremities with static positions and dynamic movement.   GAIT & BALANCE: Patient educated on the importance of strong core and lower extremity musculature in order to improve both static and dynamic balance, improve gait mechanics, reduce fall risk and improve household and community mobility.   POSTURE: Patient educated on postural awareness to reduce stress and maintain optimal alignment of the spine with static positions and dynamic movement     Written Home Exercises Provided: yes.  Exercises were reviewed and Shahzad was able to demonstrate them prior to the end of the session.  Shahzad demonstrated good  understanding of the education provided. See EMR under Patient Instructions for exercises provided during therapy sessions.    Assessment     Pt tolerated session well today. Incorporated soft tissue mobilization and foam rolling to further reduce muscle tension in the posterior hip. Significant reduction in overall tension and tenderness to palpation noted compared to previous sessions. Again incorporated cervical traction with reduction in upper extremity radicular symptoms noted following. Encouraged patient to continue home exercises in order to maintain mobility and neuromuscular control to help minimize symptoms.   An assessment was completed today with improvements noted in cervical and lumbar ROM as well as upper and lower extremity strength compared to prior assessment; please see exam for details. He/she continues to have difficulty  with mild occasional radicular symptoms into the R hand and foot. Pt has an appointment with a spine doctor coming up to further investigate continued symptoms.  FOTO scores noted above indicate the patients perceived functional levels have improved since prior assessment. The above impairments and functional deficits will continue to be addressed over the course of this plan of care. patient was educated on continued progression of PT, expectations for the duration of care, updates on goals set at initial evaluation, and home exercise program. Patient will continue to benefit from physical therapy in order to further address goals, maximize function and quality of life.     Shahzad is progressing well towards her goals.   Patient prognosis is Good.     Patient will continue to benefit from skilled outpatient physical therapy to address the deficits listed in the problem list box on initial evaluation, provide pt/family education and to maximize patient's level of independence in the home and community environment.     Patient's spiritual, cultural and educational needs considered and pt agreeable to plan of care and goals.     Anticipated Barriers for therapy: co-morbidities, sedentary lifestyle, chronicity of condition, lack of understanding of condition, and adherence to treatment plan     Goals:    Short Term Goals:  6 weeks Status  Date Met   PAIN: Pt will report worst pain of 6/10 in order to progress toward max functional ability and improve quality of life. [x] Progressing  [] Met  [] Not Met     FUNCTION: Patient will demonstrate improved function as indicated by a score of greater than or equal to 54 out of 100 on FOTO. [x] Progressing  [] Met  [] Not Met     MOBILITY: Patient will improve AROM to 50% of stated goals, listed in objective measures above, in order to progress towards independence with functional activities.  [x] Progressing  [] Met  [] Not Met     STRENGTH: Patient will improve strength to 50%  of stated goals, listed in objective measures above, in order to progress towards independence with functional activities. [x] Progressing  [] Met  [] Not Met     POSTURE: Patient will correct postural deviations in sitting and standing, to decrease pain and promote long term stability.  [x] Progressing  [] Met  [] Not Met     HEP: Patient will demonstrate independence with HEP in order to progress toward functional independence. [x] Progressing  [] Met  [] Not Met        Long Term Goals:  12 weeks Status Date Met   PAIN: Pt will report worst pain of 3/10 in order to progress toward max functional ability and improve quality of life [x] Progressing  [] Met  [] Not Met     FUNCTION: Patient will demonstrate improved function as indicated by a score of greater than or equal to 60 out of 100 on FOTO. [x] Progressing  [] Met  [] Not Met     MOBILITY: Patient will improve AROM to stated goals, listed in objective measures above, in order to return to maximal functional potential and improve quality of life.  [x] Progressing  [] Met  [] Not Met     STRENGTH: Patient will improve strength to stated goals, listed in objective measures above, in order to improve functional independence and quality of life.  [x] Progressing  [] Met  [] Not Met     GAIT: Patient will demonstrate normalized gait mechanics with minimal compensation in order to return to PLOF. [x] Progressing  [] Met  [] Not Met     Patient will return to normal ADL's, IADL's, community involvement, recreational activities, and work-related activities with less than or equal to 3/10 pain and maximal function.  [x] Progressing  [] Met  [] Not Met        Plan     Continue Plan of Care (POC) and progress per patient tolerance. See treatment section for details on planned progressions next session.      Monika Romero, PT

## 2024-10-21 ENCOUNTER — OFFICE VISIT (OUTPATIENT)
Dept: INTERNAL MEDICINE | Facility: CLINIC | Age: 64
End: 2024-10-21
Payer: COMMERCIAL

## 2024-10-21 ENCOUNTER — LAB VISIT (OUTPATIENT)
Dept: LAB | Facility: HOSPITAL | Age: 64
End: 2024-10-21
Attending: FAMILY MEDICINE
Payer: COMMERCIAL

## 2024-10-21 VITALS
DIASTOLIC BLOOD PRESSURE: 88 MMHG | WEIGHT: 181.69 LBS | HEART RATE: 77 BPM | HEIGHT: 71 IN | RESPIRATION RATE: 18 BRPM | OXYGEN SATURATION: 96 % | SYSTOLIC BLOOD PRESSURE: 152 MMHG | BODY MASS INDEX: 25.44 KG/M2 | TEMPERATURE: 99 F

## 2024-10-21 DIAGNOSIS — Z00.00 ANNUAL PHYSICAL EXAM: Primary | ICD-10-CM

## 2024-10-21 DIAGNOSIS — E55.9 VITAMIN D DEFICIENCY DISEASE: ICD-10-CM

## 2024-10-21 DIAGNOSIS — E78.2 MIXED HYPERLIPIDEMIA: ICD-10-CM

## 2024-10-21 DIAGNOSIS — Z78.0 POSTMENOPAUSAL: ICD-10-CM

## 2024-10-21 DIAGNOSIS — J30.9 CHRONIC ALLERGIC RHINITIS: ICD-10-CM

## 2024-10-21 DIAGNOSIS — R03.0 ELEVATED BLOOD PRESSURE READING: ICD-10-CM

## 2024-10-21 DIAGNOSIS — Z28.39 IMMUNIZATION DEFICIENCY: ICD-10-CM

## 2024-10-21 DIAGNOSIS — I10 PRIMARY HYPERTENSION: ICD-10-CM

## 2024-10-21 LAB
25(OH)D3+25(OH)D2 SERPL-MCNC: 55 NG/ML (ref 30–96)
ALBUMIN SERPL BCP-MCNC: 3.8 G/DL (ref 3.5–5.2)
ALP SERPL-CCNC: 77 U/L (ref 40–150)
ALT SERPL W/O P-5'-P-CCNC: 21 U/L (ref 10–44)
ANION GAP SERPL CALC-SCNC: 10 MMOL/L (ref 8–16)
AST SERPL-CCNC: 22 U/L (ref 10–40)
BASOPHILS # BLD AUTO: 0.05 K/UL (ref 0–0.2)
BASOPHILS NFR BLD: 0.9 % (ref 0–1.9)
BILIRUB SERPL-MCNC: 1 MG/DL (ref 0.1–1)
BUN SERPL-MCNC: 15 MG/DL (ref 8–23)
CALCIUM SERPL-MCNC: 9.3 MG/DL (ref 8.7–10.5)
CHLORIDE SERPL-SCNC: 108 MMOL/L (ref 95–110)
CHOLEST SERPL-MCNC: 209 MG/DL (ref 120–199)
CHOLEST/HDLC SERPL: 4.5 {RATIO} (ref 2–5)
CO2 SERPL-SCNC: 26 MMOL/L (ref 23–29)
CREAT SERPL-MCNC: 0.8 MG/DL (ref 0.5–1.4)
DIFFERENTIAL METHOD BLD: ABNORMAL
EOSINOPHIL # BLD AUTO: 0.2 K/UL (ref 0–0.5)
EOSINOPHIL NFR BLD: 2.6 % (ref 0–8)
ERYTHROCYTE [DISTWIDTH] IN BLOOD BY AUTOMATED COUNT: 13.4 % (ref 11.5–14.5)
EST. GFR  (NO RACE VARIABLE): >60 ML/MIN/1.73 M^2
GLUCOSE SERPL-MCNC: 130 MG/DL (ref 70–110)
HCT VFR BLD AUTO: 44.8 % (ref 37–48.5)
HDLC SERPL-MCNC: 46 MG/DL (ref 40–75)
HDLC SERPL: 22 % (ref 20–50)
HGB BLD-MCNC: 14 G/DL (ref 12–16)
IMM GRANULOCYTES # BLD AUTO: 0.01 K/UL (ref 0–0.04)
IMM GRANULOCYTES NFR BLD AUTO: 0.2 % (ref 0–0.5)
LDLC SERPL CALC-MCNC: 130.4 MG/DL (ref 63–159)
LYMPHOCYTES # BLD AUTO: 2.5 K/UL (ref 1–4.8)
LYMPHOCYTES NFR BLD: 43 % (ref 18–48)
MCH RBC QN AUTO: 29.2 PG (ref 27–31)
MCHC RBC AUTO-ENTMCNC: 31.3 G/DL (ref 32–36)
MCV RBC AUTO: 93 FL (ref 82–98)
MONOCYTES # BLD AUTO: 0.6 K/UL (ref 0.3–1)
MONOCYTES NFR BLD: 9.6 % (ref 4–15)
NEUTROPHILS # BLD AUTO: 2.5 K/UL (ref 1.8–7.7)
NEUTROPHILS NFR BLD: 43.7 % (ref 38–73)
NONHDLC SERPL-MCNC: 163 MG/DL
NRBC BLD-RTO: 0 /100 WBC
PLATELET # BLD AUTO: 211 K/UL (ref 150–450)
PMV BLD AUTO: 11.2 FL (ref 9.2–12.9)
POTASSIUM SERPL-SCNC: 4.1 MMOL/L (ref 3.5–5.1)
PROT SERPL-MCNC: 7.1 G/DL (ref 6–8.4)
RBC # BLD AUTO: 4.8 M/UL (ref 4–5.4)
SODIUM SERPL-SCNC: 144 MMOL/L (ref 136–145)
T4 FREE SERPL-MCNC: 0.91 NG/DL (ref 0.71–1.51)
TRIGL SERPL-MCNC: 163 MG/DL (ref 30–150)
TSH SERPL DL<=0.005 MIU/L-ACNC: 2.17 UIU/ML (ref 0.4–4)
WBC # BLD AUTO: 5.7 K/UL (ref 3.9–12.7)

## 2024-10-21 PROCEDURE — 82306 VITAMIN D 25 HYDROXY: CPT | Performed by: FAMILY MEDICINE

## 2024-10-21 PROCEDURE — 80061 LIPID PANEL: CPT | Performed by: FAMILY MEDICINE

## 2024-10-21 PROCEDURE — 99999 PR PBB SHADOW E&M-EST. PATIENT-LVL V: CPT | Mod: PBBFAC,,, | Performed by: FAMILY MEDICINE

## 2024-10-21 PROCEDURE — 80053 COMPREHEN METABOLIC PANEL: CPT | Performed by: FAMILY MEDICINE

## 2024-10-21 PROCEDURE — 85025 COMPLETE CBC W/AUTO DIFF WBC: CPT | Performed by: FAMILY MEDICINE

## 2024-10-21 PROCEDURE — 84443 ASSAY THYROID STIM HORMONE: CPT | Performed by: FAMILY MEDICINE

## 2024-10-21 PROCEDURE — G2211 COMPLEX E/M VISIT ADD ON: HCPCS | Mod: S$GLB,,, | Performed by: FAMILY MEDICINE

## 2024-10-21 PROCEDURE — 84439 ASSAY OF FREE THYROXINE: CPT | Performed by: FAMILY MEDICINE

## 2024-10-21 PROCEDURE — 99214 OFFICE O/P EST MOD 30 MIN: CPT | Mod: S$GLB,,, | Performed by: FAMILY MEDICINE

## 2024-10-21 PROCEDURE — 36415 COLL VENOUS BLD VENIPUNCTURE: CPT | Performed by: FAMILY MEDICINE

## 2024-10-21 RX ORDER — TRIAMTERENE/HYDROCHLOROTHIAZID 37.5-25 MG
1 TABLET ORAL DAILY
Start: 2024-10-21 | End: 2025-10-21
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
## 2024-10-22 ENCOUNTER — TELEPHONE (OUTPATIENT)
Dept: PHYSICAL MEDICINE AND REHAB | Facility: CLINIC | Age: 64
End: 2024-10-22
Payer: COMMERCIAL

## 2024-10-22 ENCOUNTER — OFFICE VISIT (OUTPATIENT)
Dept: PAIN MEDICINE | Facility: CLINIC | Age: 64
End: 2024-10-22
Payer: COMMERCIAL

## 2024-10-22 VITALS
BODY MASS INDEX: 25 KG/M2 | HEIGHT: 71 IN | HEART RATE: 82 BPM | DIASTOLIC BLOOD PRESSURE: 95 MMHG | RESPIRATION RATE: 17 BRPM | WEIGHT: 178.56 LBS | SYSTOLIC BLOOD PRESSURE: 177 MMHG

## 2024-10-22 DIAGNOSIS — M54.12 CERVICAL RADICULOPATHY: ICD-10-CM

## 2024-10-22 DIAGNOSIS — M54.16 LUMBAR RADICULOPATHY: ICD-10-CM

## 2024-10-22 DIAGNOSIS — M51.362 DEGENERATION OF INTERVERTEBRAL DISC OF LUMBAR REGION WITH DISCOGENIC BACK PAIN AND LOWER EXTREMITY PAIN: Primary | ICD-10-CM

## 2024-10-22 DIAGNOSIS — M54.9 DORSALGIA: ICD-10-CM

## 2024-10-22 PROCEDURE — 99999 PR PBB SHADOW E&M-EST. PATIENT-LVL V: CPT | Mod: PBBFAC,,, | Performed by: ANESTHESIOLOGY

## 2024-10-22 PROCEDURE — 99215 OFFICE O/P EST HI 40 MIN: CPT | Mod: S$GLB,,, | Performed by: ANESTHESIOLOGY

## 2024-10-22 PROCEDURE — G2211 COMPLEX E/M VISIT ADD ON: HCPCS | Mod: S$GLB,,, | Performed by: ANESTHESIOLOGY

## 2024-10-31 ENCOUNTER — OFFICE VISIT (OUTPATIENT)
Dept: PHYSICAL MEDICINE AND REHAB | Facility: CLINIC | Age: 64
End: 2024-10-31
Payer: COMMERCIAL

## 2024-10-31 VITALS — HEIGHT: 71 IN | WEIGHT: 178.56 LBS | RESPIRATION RATE: 13 BRPM | BODY MASS INDEX: 25 KG/M2

## 2024-10-31 DIAGNOSIS — M54.16 LUMBAR RADICULOPATHY: ICD-10-CM

## 2024-10-31 DIAGNOSIS — G56.03 BILATERAL CARPAL TUNNEL SYNDROME: ICD-10-CM

## 2024-10-31 PROCEDURE — 99999 PR PBB SHADOW E&M-EST. PATIENT-LVL III: CPT | Mod: PBBFAC,,, | Performed by: PHYSICAL MEDICINE & REHABILITATION

## 2024-11-12 ENCOUNTER — OFFICE VISIT (OUTPATIENT)
Dept: PAIN MEDICINE | Facility: CLINIC | Age: 64
End: 2024-11-12
Payer: COMMERCIAL

## 2024-11-12 VITALS
DIASTOLIC BLOOD PRESSURE: 74 MMHG | SYSTOLIC BLOOD PRESSURE: 142 MMHG | HEART RATE: 82 BPM | WEIGHT: 182.13 LBS | RESPIRATION RATE: 17 BRPM | HEIGHT: 71 IN | BODY MASS INDEX: 25.5 KG/M2

## 2024-11-12 DIAGNOSIS — M54.10 RADICULAR PAIN: ICD-10-CM

## 2024-11-12 DIAGNOSIS — G56.03 BILATERAL CARPAL TUNNEL SYNDROME: ICD-10-CM

## 2024-11-12 DIAGNOSIS — R20.2 NUMBNESS AND TINGLING OF RIGHT ARM AND LEG: Primary | ICD-10-CM

## 2024-11-12 DIAGNOSIS — M54.16 RIGHT LUMBAR RADICULOPATHY: ICD-10-CM

## 2024-11-12 DIAGNOSIS — R20.0 NUMBNESS AND TINGLING OF RIGHT ARM AND LEG: Primary | ICD-10-CM

## 2024-11-12 DIAGNOSIS — M54.12 RIGHT CERVICAL RADICULOPATHY: ICD-10-CM

## 2024-11-12 PROCEDURE — 99215 OFFICE O/P EST HI 40 MIN: CPT | Mod: S$GLB,,, | Performed by: PHYSICIAN ASSISTANT

## 2024-11-12 PROCEDURE — 99999 PR PBB SHADOW E&M-EST. PATIENT-LVL V: CPT | Mod: PBBFAC,,, | Performed by: PHYSICIAN ASSISTANT

## 2024-11-12 RX ORDER — PERPHENAZINE 16 MG
TABLET ORAL
COMMUNITY
Start: 2024-11-12

## 2024-11-12 RX ORDER — GABAPENTIN 300 MG/1
CAPSULE ORAL
Qty: 115 CAPSULE | Refills: 0 | Status: SHIPPED | OUTPATIENT
Start: 2024-11-12 | End: 2025-01-11
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
## 2024-11-12 NOTE — PATIENT INSTRUCTIONS
- Start (over-the-counter) alpha lipoic acid 600 to 1200mg per day - for nutritional supplement approach for leg cramping/ neuropathic pain/ radiculopathy. ALA is an antioxidant with the potential to diminish oxidative stress, improve the underlying pathophysiology of neuropathy, and reduce pain.      - Will start gabapentin 600mg QHS (with titration instructions, take 1 tablet QHS x 1 week, then increase to 2 capsules at night thereafter, increasing as tolerated). We will further increase if does not provide adequate relief. Potential side effects of this medication include daytime somnolence, weight gain and peripheral edema.

## 2024-11-14 DIAGNOSIS — M79.642 BILATERAL HAND PAIN: Primary | ICD-10-CM

## 2024-11-14 DIAGNOSIS — M79.641 BILATERAL HAND PAIN: Primary | ICD-10-CM

## 2024-11-15 ENCOUNTER — HOSPITAL ENCOUNTER (OUTPATIENT)
Dept: RADIOLOGY | Facility: HOSPITAL | Age: 64
Discharge: HOME | End: 2024-11-15
Attending: STUDENT IN AN ORGANIZED HEALTH CARE EDUCATION/TRAINING PROGRAM
Payer: COMMERCIAL

## 2024-11-15 ENCOUNTER — OFFICE VISIT (OUTPATIENT)
Dept: ORTHOPEDICS | Facility: CLINIC | Age: 64
End: 2024-11-15
Payer: COMMERCIAL

## 2024-11-15 VITALS — HEIGHT: 71 IN | WEIGHT: 182.13 LBS | BODY MASS INDEX: 25.5 KG/M2

## 2024-11-15 DIAGNOSIS — M77.11 RIGHT LATERAL EPICONDYLITIS: ICD-10-CM

## 2024-11-15 DIAGNOSIS — R20.2 NUMBNESS AND TINGLING IN RIGHT HAND: Primary | ICD-10-CM

## 2024-11-15 DIAGNOSIS — M79.642 BILATERAL HAND PAIN: ICD-10-CM

## 2024-11-15 DIAGNOSIS — R20.0 NUMBNESS AND TINGLING IN RIGHT HAND: Primary | ICD-10-CM

## 2024-11-15 DIAGNOSIS — M79.641 BILATERAL HAND PAIN: ICD-10-CM

## 2024-11-15 DIAGNOSIS — G56.03 BILATERAL CARPAL TUNNEL SYNDROME: ICD-10-CM

## 2024-11-15 PROCEDURE — 73130 X-RAY EXAM OF HAND: CPT | Mod: 26,50,, | Performed by: STUDENT IN AN ORGANIZED HEALTH CARE EDUCATION/TRAINING PROGRAM

## 2024-11-15 PROCEDURE — 99999 PR PBB SHADOW E&M-EST. PATIENT-LVL IV: CPT | Mod: PBBFAC,,, | Performed by: STUDENT IN AN ORGANIZED HEALTH CARE EDUCATION/TRAINING PROGRAM

## 2024-11-15 PROCEDURE — 73130 X-RAY EXAM OF HAND: CPT | Mod: TC,50

## 2024-11-15 RX ORDER — BETAMETHASONE SODIUM PHOSPHATE AND BETAMETHASONE ACETATE 3; 3 MG/ML; MG/ML
6 INJECTION, SUSPENSION INTRA-ARTICULAR; INTRALESIONAL; INTRAMUSCULAR; SOFT TISSUE
Status: DISCONTINUED | OUTPATIENT
Start: 2024-11-15 | End: 2024-11-15 | Stop reason: HOSPADM

## 2024-11-15 RX ADMIN — BETAMETHASONE SODIUM PHOSPHATE AND BETAMETHASONE ACETATE 6 MG: 3; 3 INJECTION, SUSPENSION INTRA-ARTICULAR; INTRALESIONAL; INTRAMUSCULAR; SOFT TISSUE at 07:11

## 2024-11-15 NOTE — PROCEDURES
Right Carpal Tunnel Injection    Date/Time: 11/15/2024 7:40 AM    Performed by: Nuzhat Laws MD  Authorized by: Nuzhat Laws MD    Consent Done?:  Yes (Verbal)  Indications:  Pain and diagnostic evaluation  Timeout: prior to procedure the correct patient, procedure, and site was verified    Local anesthesia used?: Yes    Anesthesia:  Local infiltration  Local anesthetic:  Lidocaine 1% without epinephrine  Anesthetic total (ml):  1    Location:  Wrist  Ultrasonic Guidance for Needle Placement?: No    Needle size:  25 G  Approach:  Volar  Medications:  6 mg betamethasone acetate-betamethasone sodium phosphate 6 mg/mL  Patient tolerance:  Patient tolerated the procedure well with no immediate complications     Hernandez Gross
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
## 2024-12-03 ENCOUNTER — CLINICAL SUPPORT (OUTPATIENT)
Dept: REHABILITATION | Facility: HOSPITAL | Age: 64
End: 2024-12-03
Payer: COMMERCIAL

## 2024-12-03 DIAGNOSIS — G56.03 BILATERAL CARPAL TUNNEL SYNDROME: ICD-10-CM

## 2024-12-03 DIAGNOSIS — M54.16 RIGHT LUMBAR RADICULOPATHY: ICD-10-CM

## 2024-12-03 DIAGNOSIS — M54.12 RIGHT CERVICAL RADICULOPATHY: ICD-10-CM

## 2024-12-03 PROCEDURE — 97161 PT EVAL LOW COMPLEX 20 MIN: CPT | Performed by: PHYSICAL THERAPIST

## 2024-12-03 PROCEDURE — 97140 MANUAL THERAPY 1/> REGIONS: CPT | Performed by: PHYSICAL THERAPIST

## 2024-12-10 NOTE — PLAN OF CARE
[facility]   Physical Therapy Initial Evaluation      Date: 12/3/2024   Name: Shahzad Redd [facility] Number: [id number]    Therapy Diagnosis:    Encounter Diagnoses   Name Primary?    Bilateral carpal tunnel syndrome, R>L     Right lumbar radiculopathy     Right cervical radiculopathy       Physician: Kaitlin Nguyen PA*     Physician Orders: PT Eval and Treat  Medical Diagnosis from Referral: Bilateral carpal tunnel syndrome [G56.03]   Right lumbar radiculopathy [M54.16]   Right cervical radiculopathy [M54.12]   Evaluation Date: 12/3/2024  Authorization Period Expiration: 11/12/2025  Plan of Care Expiration: 2/3/2025  Progress Note Due: 1/3/2025  Visit # / Visits authorized: 1/1   FOTO: 1/3 (last performed on 12/3/2024)    Precautions: Standard    Time In: 5:15 pm   Time Out: 6:00 pm   Total Billable Time (timed & untimed codes): 45 minutes    Subjective     Date of onset: Chronic    History of current condition - Shahzad reports she is scared to walk because she has to think about moving the right foot, she stubbed the foot the other day because she couldn't feel it. When she is inflamed she hurts more.  Has done therapy in the past, she gets relief when she does the therapy but not lasting relief.  She presents today with continued complaints of the numbness and tingling in the right leg.     Imaging: [] Xray [] MRI [] CT: Performed on: 7/16/2024  MRI Lumbar Spine Impression:     1. Multilevel degenerative changes of the lumbar spine are most pronounced at L4-L5 with moderate spinal canal stenosis and mild right-sided neural foraminal stenosis.  2. Mild L5-S1 spinal canal stenosis.    MRI Cervical Spine Impression:     Multilevel degenerative changes of the cervical spine are most pronounced at C5-C6 and C6-C7 with mild spinal canal stenosis and moderate left-sided neural foraminal stenosis.     Mild C3-C4 and C4-C5 spinal canal stenosis with mild bilateral neural foraminal  stenosis.    Pain:  Current 8/10, worst 10/10, best 3/10   Location: [x] Right   [] Left:  leg and arm   Description: aching , burning, throbbing, numbness, and tingling  Aggravating Factors: at night   Easing Factors: activity avoidance, rest    Prior Therapy:   [] N/A    [] Yes:   Social History: Pt lives with their family  Occupation: Pt works for [email or web address], sedentary desk job   Prior Level of Function: Independent and pain free with all ADL, IADL, community mobility and functional activities.   Current Level of Function: Independent with all ADL, IADL, community mobility and functional activities with reports of increased pain and need for increased time and frequent breaks.      Dominant Extremity:    [x] Right    [] Left    Pts goals: Pt reported goals are to decrease overall pain levels in order to return to prior functional level.     Medical History:   Past Medical History:   Diagnosis Date    Bone spur     disc    Bulging disc     Hypertension        Surgical History:   Shahzad Feliz  has a past surgical history that includes Ovarian cyst removal; Hysterectomy; Colonoscopy w/ biopsies and polypectomy (12/17/2014); Colonoscopy (N/A, 07/25/2019); Colonoscopy (N/A, 12/16/2022); Breast biopsy (Left); and Oophorectomy (Bilateral).    Medications:   Shahzad has a current medication list which includes the following prescription(s): alpha lipoic acid, ascorbic acid, azelastine, b complex vitamins, black cohosh, cholecalciferol (vitamin d3), cyclobenzaprine, estradiol, ezetimibe, gabapentin, glutamine, hydrocortisone-pramoxine, levalbuterol, levocetirizine, milk thistle, multivitamin, mupirocin, pantoprazole, prednisolone acetate, triamcinolone acetonide 0.1%, triamterene-hydrochlorothiazide 37.5-25 mg, turmeric/ging/olive/oreg/capry, UNABLE TO FIND, and valsartan.    Allergies:   Review of patient's allergies indicates:   Allergen Reactions    Wasp venom      Large local swelling, no systemic symptoms     [place]      Positive via patch testing      Gold salts      Positive via patch testing    Grass pollen-june grass standard Hives and Itching    Latex, natural rubber     Mold     Nickel sutures [surgical stainless steel]      Patch test positive    Pcn [penicillins] Other (See Comments)     Passed out and seizures    Shrimp Hives    Thimerosal      Patch test positive        Objective      Top Tier Notes:   Cervical Flexion Dysfunctional - Nonpainful    Cervical Extension Dysfunctional - Nonpainful    Cervical Rotation Dysfunctional - Painful Strain    Upper Extremity One (Medial Rotation) Dysfunctional - Nonpainful    Upper Extremity Two (External Rotation) Dysfunctional - Nonpainful    Multi-Segmental Flexion Dysfunctional - Painful    Multi-Segmental Extension Dysfunctional - Painful    Multi-Segmental Rotation Dysfunctional - Painful    Single Leg Stance Dysfunctional - Painful 1-2 seconds Bilateral    Arms Down Deep Squat Deferred       STRENGTH:   U/E MMT Right Left Pain/Dysfunction with Movement Goal   Shoulder Flexion 4-/5 4/5  4+/5 B   Shoulder Extension 4-/5 4/5  4+/5 B   Shoulder Abduction 4-/5 4/5  4+/5 B   Middle Trapezius 3+/5 3+/5  4+/5 B       L/E MMT Right  (spine) Left Pain/Dysfunction with Movement Goal   Hip Flexion  4-/5 4/5  4+/5 B   Hip Extension  4/5 4+/5  4+/5 B   Hip Abduction  4-/5 4-/5  4+/5 B   Knee Extension 4+/5 4+/5  5/5 B   Knee Flexion 4+/5 4+/5  5/5 B   Ankle DF 4/5 4/5  5/5 B        SENSATION  [x] Intact to Light Touch   [] Impaired:    POSTURE:  Pt presents with postural abnormalities which include:     [x] Forward Head                               [] Increased Lumbar Lordosis              [x] Rounded Shoulder                        [] Genu Recurvatum              [] Increased Thoracic Kyphosis        [] Genu Valgus              [] Trunk Deviated                              [] Pes Planus              [] Scapular Winging                          [] Other:       FUNCTIONAL  TESTING    Outcome Norms Goal   Five Time Sit to Stand 19 seconds  60s: <11.4 sec  70s: <12.6 sec  80s: 14.8 sec 16 seconds      Function:     Intake Outcome Measure for FOTO TBD Survey    Therapist reviewed FOTO scores for Shahzad on 12/3/2024.   FOTO report - see Media section or FOTO account for episode details    Intake Score: -%         Treatment     Total Treatment time (time-based codes) separate from Evaluation: (10) minutes     Shahzad received the treatments listed below:        CPT Intervention Performed   Today Duration / Intensity   MT Hooklying distraction  x Lumbar spine intermittent    TE                   NMR                    TA                                                PLAN               CPT Codes available for Billing:   (10) minutes of Manual therapy (MT) to improve pain and ROM.  (00) minutes of Therapeutic Exercise (TE) to develop strength, endurance, range of motion, and flexibility.  (00) minutes of Neuromuscular Re-Education (NMR)  to improve: Balance, Coordination, Kinesthetic, Sense, Proprioception, and Posture.  (00) minutes of Therapeutic Activities (TA) to improve functional performance.  Vasopneumatic Device Therapy () for management of swelling/edema. (72843)  Unattended Electrical Stimulation (ES) for muscle performance or pain modulation.  BFR: Blood flow restriction applied during exercise      Patient Education and Home Exercises     Education provided: (with above exercise and measurement outcomes) minutes  PURPOSE: Patient educated on the impairments noted above and the effects of physical therapy intervention to improve overall condition and QOL.   EXERCISE: Patient was educated on all the above exercise prior/during/after for proper posture, positioning, and execution for safe performance with home exercise program.   POSTURE: Patient educated on postural awareness to reduce stress and maintain optimal alignment of the spine with static positions and dynamic movement  "    Written Home Exercises Provided: yes.  Exercises were reviewed and Shahzad was able to demonstrate them prior to the end of the session.  Shahzad demonstrated good  understanding of the education provided. See EMR under Patient Instructions for exercises provided during therapy sessions.    Assessment     Shahzad is a 64 y.o. female referred to outpatient Physical Therapy with a medical diagnosis of Bilateral carpal tunnel syndrome [G56.03]   Right lumbar radiculopathy [M54.16]   Right cervical radiculopathy [M54.12] . Pt presents with impairments in the following categories: IMPAIRMENTS: ROM, strength, muscle length, balance, posture, and core strength and stability    Pt prognosis is Fair  Pt will benefit from skilled outpatient Physical Therapy to address the deficits stated above and in the chart below, provide pt/family education, and to maximize pt's level of independence.     Plan of care discussed with patient: Yes  Pt's spiritual, cultural and educational needs considered and patient is agreeable to the plan of care and goals as stated below:     Anticipated Barriers for therapy: co-morbidities, chronicity of condition, and adherence to treatment plan    Medical Necessity is demonstrated by the following  History  Co-morbidities and personal factors that may impact the plan of care [x] LOW: no personal factors / co-morbidities  [] MODERATE: 1-2 personal factors / co-morbidities  [] HIGH: 3+ personal factors / co-morbidities    Moderate / High Support Documentation:   Past Medical History:   Diagnosis Date    Bone spur     disc    Bulging disc     Hypertension         Examination  Body Structures and Functions, activity limitations and participation restrictions that may impact the plan of care [] LOW: addressing 1-2 elements  [x] MODERATE: 3+ elements  [] HIGH: 4+ elements (please support below)    Moderate / High Support Documentation: See above in "Current Level of Function"      Clinical Presentation [] LOW: " stable  [x] MODERATE: Evolving  [] HIGH: Unstable     Decision Making/ Complexity Score: moderate         Short Term Goals:  4 weeks Status  Date Met   PAIN: Pt will report worst pain of 6/10 in order to progress toward max functional ability and improve quality of life. [x] Progressing  [] Met  [] Not Met    MOBILITY: Patient will improve AROM to 50% of stated goals, listed in objective measures above, in order to progress towards independence with functional activities.  [x] Progressing  [] Met  [] Not Met    STRENGTH: Patient will improve strength to 50% of stated goals, listed in objective measures above, in order to progress towards independence with functional activities. [x] Progressing  [] Met  [] Not Met    HEP: Patient will demonstrate independence with HEP in order to progress toward functional independence. [x] Progressing  [] Met  [] Not Met      Long Term Goals:  8 weeks Status Date Met   PAIN: Pt will report worst pain of 4/10 in order to progress toward max functional ability and improve quality of life [x] Progressing  [] Met  [] Not Met    FUNCTION: Patient will demonstrate improved function as indicated by a score of greater than or equal to TBD out of 100 on FOTO. [x] Progressing  [] Met  [] Not Met    MOBILITY: Patient will improve AROM to stated goals, listed in objective measures above, in order to return to maximal functional potential and improve quality of life.  [x] Progressing  [] Met  [] Not Met    STRENGTH: Patient will improve strength to stated goals, listed in objective measures above, in order to improve functional independence and quality of life.  [x] Progressing  [] Met  [] Not Met    GAIT: Patient will demonstrate normalized gait mechanics with minimal compensation in order to return to PLOF. [x] Progressing  [] Met  [] Not Met    Patient will return to normal ADL's, IADL's, community involvement, recreational activities, and work-related activities with less than or equal to  4/10 pain and maximal function.  [x] Progressing  [] Met  [] Not Met      Plan     Plan of care Certification: 12/3/2024 to 2/3/2024.    Outpatient Physical Therapy 2 times weekly for 8 weeks to include any combination of the following interventions: virtual visits, dry needling, modalities, electrical stimulation (IFC, Pre-Mod, Attended with Functional Dry Needling), Aquatic Therapy, Cervical/Lumbar Traction, Gait Training, Manual Therapy, Neuromuscular Re-ed, Patient Education, Self Care, Therapeutic Exercise, and Therapeutic Activites     Wendy Conway, PT, DPT, DPT

## 2024-12-18 RX ORDER — PANTOPRAZOLE SODIUM 40 MG/1
40 TABLET, DELAYED RELEASE ORAL
Qty: 90 TABLET | Refills: 3 | Status: SHIPPED | OUTPATIENT
Start: 2024-12-18

## 2024-12-18 NOTE — TELEPHONE ENCOUNTER
Refill Decision Note   Shahzadlenin Feliz  is requesting a refill authorization.  Brief Assessment and Rationale for Refill:  Approve     Medication Therapy Plan:         Comments:     Note composed:5:14 AM 12/18/2024

## 2024-12-18 NOTE — TELEPHONE ENCOUNTER
No care due was identified.  [facility] Embedded Care Due Messages. Reference number: [id number].   12/18/2024 12:31:27 AM CST

## 2025-01-07 ENCOUNTER — OFFICE VISIT (OUTPATIENT)
Dept: PAIN MEDICINE | Facility: CLINIC | Age: 65
End: 2025-01-07
Payer: COMMERCIAL

## 2025-01-07 VITALS
HEART RATE: 81 BPM | SYSTOLIC BLOOD PRESSURE: 122 MMHG | WEIGHT: 180.56 LBS | DIASTOLIC BLOOD PRESSURE: 75 MMHG | BODY MASS INDEX: 25.28 KG/M2 | HEIGHT: 71 IN

## 2025-01-07 DIAGNOSIS — M50.30 DDD (DEGENERATIVE DISC DISEASE), CERVICAL: ICD-10-CM

## 2025-01-07 DIAGNOSIS — M54.16 LUMBAR RADICULOPATHY: Primary | ICD-10-CM

## 2025-01-07 DIAGNOSIS — M54.12 CERVICAL RADICULOPATHY: ICD-10-CM

## 2025-01-07 DIAGNOSIS — M51.362 DEGENERATION OF INTERVERTEBRAL DISC OF LUMBAR REGION WITH DISCOGENIC BACK PAIN AND LOWER EXTREMITY PAIN: ICD-10-CM

## 2025-01-07 PROCEDURE — 99215 OFFICE O/P EST HI 40 MIN: CPT | Mod: S$GLB,,, | Performed by: ANESTHESIOLOGY

## 2025-01-07 PROCEDURE — G2211 COMPLEX E/M VISIT ADD ON: HCPCS | Mod: S$GLB,,, | Performed by: ANESTHESIOLOGY

## 2025-01-07 PROCEDURE — 99999 PR PBB SHADOW E&M-EST. PATIENT-LVL V: CPT | Mod: PBBFAC,,, | Performed by: ANESTHESIOLOGY
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
## 2025-01-17 ENCOUNTER — OFFICE VISIT (OUTPATIENT)
Dept: ORTHOPEDICS | Facility: CLINIC | Age: 65
End: 2025-01-17
Payer: COMMERCIAL

## 2025-01-17 VITALS — BODY MASS INDEX: 25.28 KG/M2 | WEIGHT: 180.56 LBS | HEIGHT: 71 IN

## 2025-01-17 DIAGNOSIS — R20.2 NUMBNESS AND TINGLING IN RIGHT HAND: ICD-10-CM

## 2025-01-17 DIAGNOSIS — G56.03 BILATERAL CARPAL TUNNEL SYNDROME: ICD-10-CM

## 2025-01-17 DIAGNOSIS — R20.0 NUMBNESS AND TINGLING IN RIGHT HAND: ICD-10-CM

## 2025-01-17 DIAGNOSIS — M77.11 RIGHT LATERAL EPICONDYLITIS: Primary | ICD-10-CM

## 2025-01-17 DIAGNOSIS — M54.50 LOW BACK PAIN, UNSPECIFIED BACK PAIN LATERALITY, UNSPECIFIED CHRONICITY, UNSPECIFIED WHETHER SCIATICA PRESENT: ICD-10-CM

## 2025-01-17 DIAGNOSIS — M54.2 NECK PAIN: ICD-10-CM

## 2025-01-17 PROCEDURE — 99999 PR PBB SHADOW E&M-EST. PATIENT-LVL V: CPT | Mod: PBBFAC,,, | Performed by: STUDENT IN AN ORGANIZED HEALTH CARE EDUCATION/TRAINING PROGRAM

## 2025-01-17 PROCEDURE — 99214 OFFICE O/P EST MOD 30 MIN: CPT | Mod: S$GLB,,, | Performed by: STUDENT IN AN ORGANIZED HEALTH CARE EDUCATION/TRAINING PROGRAM
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
## 2025-03-10 ENCOUNTER — TELEPHONE (OUTPATIENT)
Dept: INTERNAL MEDICINE | Facility: CLINIC | Age: 65
End: 2025-03-10
Payer: COMMERCIAL

## 2025-03-10 NOTE — TELEPHONE ENCOUNTER
Tried calling pt had a cancellation but unable to reach called daughter also unable to reach    Called pt back left vm    ----- Message from [employer] sent at 3/10/2025  3:00 PM CDT -----  Contact: self  Type:  Sooner Apoointment RequestCaller is requesting a sooner appointment.  Caller declined first available appointment listed below.  Caller will not accept being placed on the waitlist and is requesting a message be sent to doctor.Name of Caller:Shahzad RoCarlossloane is the first available appointment?6/4/25Symptoms:follow Would the patient rather a call back or a response via [facility]? Call [facility] Call Back Number:[phone] Information: the patient had to reschedule the appointment this week to due death in the family and wants to see if she can be squeezed in. the patient is leaving Wednesday night.

## 2025-03-12 ENCOUNTER — OFFICE VISIT (OUTPATIENT)
Dept: INTERNAL MEDICINE | Facility: CLINIC | Age: 65
End: 2025-03-12
Payer: COMMERCIAL

## 2025-03-12 VITALS
OXYGEN SATURATION: 95 % | DIASTOLIC BLOOD PRESSURE: 80 MMHG | HEIGHT: 71 IN | HEART RATE: 68 BPM | SYSTOLIC BLOOD PRESSURE: 130 MMHG | BODY MASS INDEX: 24.88 KG/M2 | WEIGHT: 177.69 LBS

## 2025-03-12 DIAGNOSIS — Z28.39 IMMUNIZATION DEFICIENCY: ICD-10-CM

## 2025-03-12 DIAGNOSIS — K64.4 EXTERNAL HEMORRHOIDS: ICD-10-CM

## 2025-03-12 DIAGNOSIS — I10 PRIMARY HYPERTENSION: ICD-10-CM

## 2025-03-12 DIAGNOSIS — L30.9 DERMATITIS: ICD-10-CM

## 2025-03-12 DIAGNOSIS — E78.2 MIXED HYPERLIPIDEMIA: Primary | ICD-10-CM

## 2025-03-12 PROCEDURE — 99999 PR PBB SHADOW E&M-EST. PATIENT-LVL IV: CPT | Mod: PBBFAC,,, | Performed by: FAMILY MEDICINE

## 2025-03-12 PROCEDURE — 99214 OFFICE O/P EST MOD 30 MIN: CPT | Mod: S$GLB,,, | Performed by: FAMILY MEDICINE

## 2025-03-12 RX ORDER — TRIAMTERENE/HYDROCHLOROTHIAZID 37.5-25 MG
1 TABLET ORAL DAILY
Qty: 90 TABLET | Refills: 3 | Status: SHIPPED | OUTPATIENT
Start: 2025-03-12 | End: 2026-03-12

## 2025-03-12 RX ORDER — TRIAMCINOLONE ACETONIDE 1 MG/G
CREAM TOPICAL 2 TIMES DAILY
Qty: 80 G | Refills: 2 | Status: SHIPPED | OUTPATIENT
Start: 2025-03-12

## 2025-03-12 RX ORDER — VALSARTAN 320 MG/1
320 TABLET ORAL DAILY
Qty: 90 TABLET | Refills: 3 | Status: SHIPPED | OUTPATIENT
Start: 2025-03-12 | End: 2026-03-12

## 2025-03-12 RX ORDER — MUPIROCIN 20 MG/G
OINTMENT TOPICAL
Qty: 22 G | Refills: 5 | Status: SHIPPED | OUTPATIENT
Start: 2025-03-12

## 2025-03-12 NOTE — PROGRESS NOTES
Patient ID: Shahzad Feliz is a 64 y.o. female.    Chief Complaint: Follow-up    History of Present Illness    Ms. Feliz presents today for follow up.    She reports elevated blood pressure at today's visit due to missing medications this morning. Home blood pressure readings have been well controlled, ranging from 130/70-80. She has been taking only Triamterene for the past couple weeks, having discontinued valsartan therapy.    She reports insufficient sleep due to staying up most of the night preparing for an upcoming road trip.    She requests refills for  cream for allergies,bactroban  for back breakouts, and hemorrhoid medication.      ROS:  General: -fever, -chills, -fatigue, -weight gain, -weight loss  Eyes: -vision changes, -redness, -discharge  ENT: -ear pain, -nasal congestion, -sore throat  Cardiovascular: -chest pain, -palpitations, -lower extremity edema  Respiratory: -cough, -shortness of breath  Gastrointestinal: -abdominal pain, -nausea, -vomiting, -diarrhea, -constipation, -blood in stool  Genitourinary: -dysuria, -hematuria, -frequency  Musculoskeletal: -joint pain, -muscle pain  Skin: -rash, -lesion  Neurological: -headache, -dizziness, -numbness, -tingling  Psychiatric: +sleep difficulty         Physical Exam    General: In no acute distress. Not ill-appearing or diaphoretic.  Neck: Normal thyroid. No cervical lymphadenopathy. 2+ carotid pulses.  Cardiovascular: Normal rate and regular  rhythm. No murmur heard. No gallop.  Pulmonary: Pulmonary effort is normal. No respiratory distress. No wheezing. No rhonchi. No rales.  Abdominal: Soft. Nontender. Nondistended. No mass.  Musculoskeletal: No swelling. No tenderness. No deformity.  Neurological: Alert.  Psychiatric: Mood normal. Behavior normal.         Assessment & Plan    HYPERTENSION:  - Evaluated blood pressure control; noted elevated in-office reading likely due to missed morning medication dose and lack of sleep.  - Assessed  current medication regimen for hypertension management, including the patient's recent self-adjustment of statin and Triamterene use.  - Determined continuation of both statin and Triamterene necessary for optimal blood pressure control.  - Instructed the patient to monitor blood pressure at home.  - Advised the patient to continue both blood pressure medications unless systolic pressure consistently falls below 110.  - Directed the patient to call in for medication refills as needed.  - Continued statin and Triamterene for blood pressure management; to be taken together in the morning.  - Refilled Triamterene; provided 3-month supply (1 tablet daily).  - Scheduled follow-up if blood pressure consistently falls below 110/[not specified].    HEMORRHOIDS:  - Evaluated ongoing issues with hemorrhoids and need for topical treatment.  - Refilled hemorrhoid foam cream.    ALLERGIES:  - Refilled Palpatine cream for allergies.    ACNE:  - Refilled mesenopril cream for acne breakouts.    INSOMNIA:  - Noted the patient's report of lack of sleep due to preparing for a trip.    MEDICATION MANAGEMENT:  - Continued Zitibat; current supply sufficient until August.              Follow up in about 6 months (around 9/12/2025).    This note was generated with the assistance of ambient listening technology. Verbal consent was obtained by the patient and accompanying visitor(s) for the recording of patient appointment to facilitate this note. I attest to having reviewed and edited the generated note for accuracy, though some syntax or spelling errors may persist. Please contact the author of this note for any clarification.